# Patient Record
Sex: MALE | Race: WHITE | Employment: FULL TIME | URBAN - METROPOLITAN AREA
[De-identification: names, ages, dates, MRNs, and addresses within clinical notes are randomized per-mention and may not be internally consistent; named-entity substitution may affect disease eponyms.]

---

## 2017-03-21 ENCOUNTER — OFFICE VISIT (OUTPATIENT)
Dept: FAMILY MEDICINE CLINIC | Age: 47
End: 2017-03-21
Payer: COMMERCIAL

## 2017-03-21 VITALS
DIASTOLIC BLOOD PRESSURE: 82 MMHG | WEIGHT: 315 LBS | RESPIRATION RATE: 20 BRPM | SYSTOLIC BLOOD PRESSURE: 118 MMHG | TEMPERATURE: 99.1 F | BODY MASS INDEX: 65.41 KG/M2 | HEART RATE: 84 BPM

## 2017-03-21 DIAGNOSIS — I10 ESSENTIAL HYPERTENSION: ICD-10-CM

## 2017-03-21 DIAGNOSIS — J20.9 ACUTE BRONCHITIS, UNSPECIFIED ORGANISM: Primary | ICD-10-CM

## 2017-03-21 DIAGNOSIS — R06.2 WHEEZING: ICD-10-CM

## 2017-03-21 DIAGNOSIS — E78.5 HYPERLIPIDEMIA, UNSPECIFIED HYPERLIPIDEMIA TYPE: ICD-10-CM

## 2017-03-21 DIAGNOSIS — R73.03 PREDIABETES: ICD-10-CM

## 2017-03-21 DIAGNOSIS — E66.01 MORBID OBESITY WITH BMI OF 60.0-69.9, ADULT (HCC): ICD-10-CM

## 2017-03-21 PROCEDURE — 99213 OFFICE O/P EST LOW 20 MIN: CPT | Performed by: NURSE PRACTITIONER

## 2017-03-21 PROCEDURE — G8484 FLU IMMUNIZE NO ADMIN: HCPCS | Performed by: NURSE PRACTITIONER

## 2017-03-21 PROCEDURE — G8417 CALC BMI ABV UP PARAM F/U: HCPCS | Performed by: NURSE PRACTITIONER

## 2017-03-21 PROCEDURE — G8427 DOCREV CUR MEDS BY ELIG CLIN: HCPCS | Performed by: NURSE PRACTITIONER

## 2017-03-21 PROCEDURE — 1036F TOBACCO NON-USER: CPT | Performed by: NURSE PRACTITIONER

## 2017-03-21 RX ORDER — METHYLPREDNISOLONE 4 MG/1
TABLET ORAL
Qty: 1 KIT | Refills: 0 | Status: SHIPPED | OUTPATIENT
Start: 2017-03-21 | End: 2017-03-21 | Stop reason: CLARIF

## 2017-03-21 RX ORDER — AZITHROMYCIN 250 MG/1
TABLET, FILM COATED ORAL
Qty: 6 TABLET | Refills: 0 | Status: SHIPPED | OUTPATIENT
Start: 2017-03-21 | End: 2017-03-31

## 2017-03-21 RX ORDER — AZITHROMYCIN 250 MG/1
TABLET, FILM COATED ORAL
Qty: 6 TABLET | Refills: 0 | Status: SHIPPED | OUTPATIENT
Start: 2017-03-21 | End: 2017-03-21 | Stop reason: CLARIF

## 2017-03-21 RX ORDER — ALBUTEROL SULFATE 90 UG/1
2 AEROSOL, METERED RESPIRATORY (INHALATION) EVERY 4 HOURS PRN
Qty: 1 INHALER | Refills: 0 | Status: SHIPPED | OUTPATIENT
Start: 2017-03-21 | End: 2018-02-28 | Stop reason: SDUPTHER

## 2017-03-21 RX ORDER — METHYLPREDNISOLONE 4 MG/1
TABLET ORAL
Qty: 1 KIT | Refills: 0 | Status: SHIPPED | OUTPATIENT
Start: 2017-03-21 | End: 2017-03-27

## 2017-03-21 RX ORDER — ALBUTEROL SULFATE 90 UG/1
2 AEROSOL, METERED RESPIRATORY (INHALATION) EVERY 6 HOURS PRN
Qty: 1 INHALER | Refills: 1 | Status: SHIPPED | OUTPATIENT
Start: 2017-03-21 | End: 2017-03-21 | Stop reason: CLARIF

## 2017-03-21 ASSESSMENT — ENCOUNTER SYMPTOMS
EYE DISCHARGE: 0
CHEST TIGHTNESS: 1
VOMITING: 0
COUGH: 1
SORE THROAT: 0
EYE REDNESS: 0
WHEEZING: 1
NAUSEA: 0
SHORTNESS OF BREATH: 1
RHINORRHEA: 0
ABDOMINAL PAIN: 0
SINUS PRESSURE: 0
DIARRHEA: 0
EYE PAIN: 0

## 2017-04-21 ENCOUNTER — HOSPITAL ENCOUNTER (OUTPATIENT)
Age: 47
Discharge: HOME | End: 2017-04-21
Payer: COMMERCIAL

## 2017-04-21 DIAGNOSIS — R73.03 PREDIABETES: ICD-10-CM

## 2017-04-21 DIAGNOSIS — E78.5 HYPERLIPIDEMIA, UNSPECIFIED HYPERLIPIDEMIA TYPE: ICD-10-CM

## 2017-04-21 DIAGNOSIS — E66.01 MORBID OBESITY WITH BMI OF 60.0-69.9, ADULT (HCC): ICD-10-CM

## 2017-04-21 DIAGNOSIS — I10 ESSENTIAL HYPERTENSION: ICD-10-CM

## 2017-04-21 LAB
ALBUMIN SERPL-MCNC: 3.9 G/DL (ref 3.5–5.2)
ALBUMIN/GLOBULIN RATIO: 1.1 (ref 1–2.5)
ALP BLD-CCNC: 57 U/L (ref 40–129)
ALT SERPL-CCNC: 17 U/L (ref 5–41)
ANION GAP SERPL CALCULATED.3IONS-SCNC: 9 MMOL/L (ref 9–17)
AST SERPL-CCNC: 17 U/L
BILIRUB SERPL-MCNC: 0.34 MG/DL (ref 0.3–1.2)
BUN BLDV-MCNC: 10 MG/DL (ref 6–20)
BUN/CREAT BLD: ABNORMAL (ref 9–20)
CALCIUM SERPL-MCNC: 8.6 MG/DL (ref 8.6–10.4)
CHLORIDE BLD-SCNC: 100 MMOL/L (ref 98–107)
CHOLESTEROL/HDL RATIO: 4.2
CHOLESTEROL: 208 MG/DL
CO2: 29 MMOL/L (ref 20–31)
CREAT SERPL-MCNC: 0.78 MG/DL (ref 0.7–1.2)
ESTIMATED AVERAGE GLUCOSE: 126 MG/DL
GFR AFRICAN AMERICAN: >60 ML/MIN
GFR NON-AFRICAN AMERICAN: >60 ML/MIN
GFR SERPL CREATININE-BSD FRML MDRD: ABNORMAL ML/MIN/{1.73_M2}
GFR SERPL CREATININE-BSD FRML MDRD: ABNORMAL ML/MIN/{1.73_M2}
GLUCOSE BLD-MCNC: 104 MG/DL (ref 70–99)
HBA1C MFR BLD: 6 % (ref 4–6)
HDLC SERPL-MCNC: 50 MG/DL
LDL CHOLESTEROL: 139 MG/DL (ref 0–130)
POTASSIUM SERPL-SCNC: 4.8 MMOL/L (ref 3.7–5.3)
SODIUM BLD-SCNC: 138 MMOL/L (ref 135–144)
TOTAL PROTEIN: 7.3 G/DL (ref 6.4–8.3)
TRIGL SERPL-MCNC: 95 MG/DL
VLDLC SERPL CALC-MCNC: ABNORMAL MG/DL (ref 1–30)

## 2017-04-21 PROCEDURE — 36415 COLL VENOUS BLD VENIPUNCTURE: CPT

## 2017-04-21 PROCEDURE — 83036 HEMOGLOBIN GLYCOSYLATED A1C: CPT

## 2017-04-21 PROCEDURE — 80053 COMPREHEN METABOLIC PANEL: CPT

## 2017-04-21 PROCEDURE — 80061 LIPID PANEL: CPT

## 2017-04-24 ENCOUNTER — OFFICE VISIT (OUTPATIENT)
Dept: FAMILY MEDICINE CLINIC | Age: 47
End: 2017-04-24
Payer: COMMERCIAL

## 2017-04-24 VITALS
BODY MASS INDEX: 66.37 KG/M2 | SYSTOLIC BLOOD PRESSURE: 122 MMHG | RESPIRATION RATE: 20 BRPM | DIASTOLIC BLOOD PRESSURE: 84 MMHG | HEART RATE: 72 BPM | WEIGHT: 315 LBS | TEMPERATURE: 98 F

## 2017-04-24 DIAGNOSIS — M54.50 ACUTE MIDLINE LOW BACK PAIN WITHOUT SCIATICA: ICD-10-CM

## 2017-04-24 DIAGNOSIS — E66.01 MORBID OBESITY WITH BMI OF 60.0-69.9, ADULT (HCC): ICD-10-CM

## 2017-04-24 DIAGNOSIS — I10 ESSENTIAL HYPERTENSION: ICD-10-CM

## 2017-04-24 DIAGNOSIS — R73.03 PREDIABETES: Primary | ICD-10-CM

## 2017-04-24 DIAGNOSIS — E78.00 PURE HYPERCHOLESTEROLEMIA: ICD-10-CM

## 2017-04-24 PROCEDURE — G8417 CALC BMI ABV UP PARAM F/U: HCPCS | Performed by: NURSE PRACTITIONER

## 2017-04-24 PROCEDURE — 99214 OFFICE O/P EST MOD 30 MIN: CPT | Performed by: NURSE PRACTITIONER

## 2017-04-24 PROCEDURE — 1036F TOBACCO NON-USER: CPT | Performed by: NURSE PRACTITIONER

## 2017-04-24 PROCEDURE — G8427 DOCREV CUR MEDS BY ELIG CLIN: HCPCS | Performed by: NURSE PRACTITIONER

## 2017-04-24 RX ORDER — CYCLOBENZAPRINE HCL 10 MG
10 TABLET ORAL EVERY 8 HOURS PRN
Qty: 45 TABLET | Refills: 0 | Status: SHIPPED | OUTPATIENT
Start: 2017-04-24 | End: 2017-05-24

## 2017-04-24 RX ORDER — ASPIRIN 81 MG/1
81 TABLET ORAL DAILY
Qty: 30 TABLET | COMMUNITY
Start: 2017-04-24 | End: 2019-03-07

## 2017-04-24 RX ORDER — METFORMIN HYDROCHLORIDE 500 MG/1
500 TABLET, EXTENDED RELEASE ORAL
Qty: 60 TABLET | Refills: 2 | Status: SHIPPED | OUTPATIENT
Start: 2017-04-24 | End: 2018-04-10

## 2017-04-24 RX ORDER — IBUPROFEN 800 MG/1
800 TABLET ORAL EVERY 8 HOURS PRN
Qty: 90 TABLET | COMMUNITY
Start: 2017-04-24 | End: 2018-04-10 | Stop reason: ALTCHOICE

## 2017-04-24 RX ORDER — CYCLOBENZAPRINE HCL 10 MG
10 TABLET ORAL EVERY 8 HOURS PRN
Qty: 45 TABLET | Refills: 0 | Status: SHIPPED | OUTPATIENT
Start: 2017-04-24 | End: 2017-04-24 | Stop reason: SDUPTHER

## 2017-04-24 ASSESSMENT — ENCOUNTER SYMPTOMS
SINUS PRESSURE: 0
SORE THROAT: 0
SHORTNESS OF BREATH: 0
ABDOMINAL PAIN: 0
CHEST TIGHTNESS: 0
DIARRHEA: 0
BACK PAIN: 1
EYES NEGATIVE: 1
CONSTIPATION: 0
NAUSEA: 0
COUGH: 0

## 2017-04-24 ASSESSMENT — PATIENT HEALTH QUESTIONNAIRE - PHQ9
SUM OF ALL RESPONSES TO PHQ QUESTIONS 1-9: 0
SUM OF ALL RESPONSES TO PHQ9 QUESTIONS 1 & 2: 0
2. FEELING DOWN, DEPRESSED OR HOPELESS: 0
1. LITTLE INTEREST OR PLEASURE IN DOING THINGS: 0

## 2018-02-26 ENCOUNTER — TELEPHONE (OUTPATIENT)
Dept: FAMILY MEDICINE CLINIC | Age: 48
End: 2018-02-26

## 2018-02-26 DIAGNOSIS — R06.2 WHEEZING: ICD-10-CM

## 2018-02-26 DIAGNOSIS — J20.9 ACUTE BRONCHITIS, UNSPECIFIED ORGANISM: ICD-10-CM

## 2018-02-28 RX ORDER — ALBUTEROL SULFATE 90 UG/1
2 AEROSOL, METERED RESPIRATORY (INHALATION) EVERY 6 HOURS PRN
Qty: 8.5 G | Refills: 0 | Status: SHIPPED | OUTPATIENT
Start: 2018-02-28 | End: 2021-02-15 | Stop reason: ALTCHOICE

## 2018-04-09 ENCOUNTER — HOSPITAL ENCOUNTER (OUTPATIENT)
Age: 48
Discharge: HOME | End: 2018-04-09
Payer: COMMERCIAL

## 2018-04-09 DIAGNOSIS — R73.03 PREDIABETES: ICD-10-CM

## 2018-04-09 DIAGNOSIS — E66.01 MORBID OBESITY WITH BMI OF 60.0-69.9, ADULT (HCC): ICD-10-CM

## 2018-04-09 DIAGNOSIS — I10 ESSENTIAL HYPERTENSION: ICD-10-CM

## 2018-04-09 DIAGNOSIS — E78.00 PURE HYPERCHOLESTEROLEMIA: ICD-10-CM

## 2018-04-09 LAB
ALBUMIN SERPL-MCNC: 3.9 G/DL (ref 3.5–5.2)
ALBUMIN/GLOBULIN RATIO: 1.2 (ref 1–2.5)
ALP BLD-CCNC: 74 U/L (ref 40–129)
ALT SERPL-CCNC: 16 U/L (ref 5–41)
ANION GAP SERPL CALCULATED.3IONS-SCNC: 10 MMOL/L (ref 9–17)
AST SERPL-CCNC: 17 U/L
BILIRUB SERPL-MCNC: 0.34 MG/DL (ref 0.3–1.2)
BUN BLDV-MCNC: 11 MG/DL (ref 6–20)
BUN/CREAT BLD: NORMAL (ref 9–20)
CALCIUM SERPL-MCNC: 9.3 MG/DL (ref 8.6–10.4)
CHLORIDE BLD-SCNC: 99 MMOL/L (ref 98–107)
CHOLESTEROL/HDL RATIO: 4.6
CHOLESTEROL: 180 MG/DL
CO2: 29 MMOL/L (ref 20–31)
CREAT SERPL-MCNC: 1.06 MG/DL (ref 0.7–1.2)
GFR AFRICAN AMERICAN: >60 ML/MIN
GFR NON-AFRICAN AMERICAN: >60 ML/MIN
GFR SERPL CREATININE-BSD FRML MDRD: NORMAL ML/MIN/{1.73_M2}
GFR SERPL CREATININE-BSD FRML MDRD: NORMAL ML/MIN/{1.73_M2}
GLUCOSE BLD-MCNC: 99 MG/DL (ref 70–99)
HDLC SERPL-MCNC: 39 MG/DL
LDL CHOLESTEROL: 122 MG/DL (ref 0–130)
POTASSIUM SERPL-SCNC: 4.7 MMOL/L (ref 3.7–5.3)
SODIUM BLD-SCNC: 138 MMOL/L (ref 135–144)
TOTAL PROTEIN: 7.1 G/DL (ref 6.4–8.3)
TRIGL SERPL-MCNC: 97 MG/DL
VLDLC SERPL CALC-MCNC: ABNORMAL MG/DL (ref 1–30)

## 2018-04-09 PROCEDURE — 80061 LIPID PANEL: CPT

## 2018-04-09 PROCEDURE — 36415 COLL VENOUS BLD VENIPUNCTURE: CPT

## 2018-04-09 PROCEDURE — 83036 HEMOGLOBIN GLYCOSYLATED A1C: CPT

## 2018-04-09 PROCEDURE — 80053 COMPREHEN METABOLIC PANEL: CPT

## 2018-04-10 ENCOUNTER — OFFICE VISIT (OUTPATIENT)
Dept: FAMILY MEDICINE CLINIC | Age: 48
End: 2018-04-10
Payer: COMMERCIAL

## 2018-04-10 VITALS
BODY MASS INDEX: 50.62 KG/M2 | SYSTOLIC BLOOD PRESSURE: 136 MMHG | OXYGEN SATURATION: 99 % | WEIGHT: 315 LBS | HEART RATE: 78 BPM | DIASTOLIC BLOOD PRESSURE: 77 MMHG | TEMPERATURE: 98.5 F | HEIGHT: 66 IN

## 2018-04-10 DIAGNOSIS — I10 ESSENTIAL HYPERTENSION: ICD-10-CM

## 2018-04-10 DIAGNOSIS — R73.03 PREDIABETES: ICD-10-CM

## 2018-04-10 DIAGNOSIS — E78.00 PURE HYPERCHOLESTEROLEMIA: Primary | ICD-10-CM

## 2018-04-10 DIAGNOSIS — M25.562 CHRONIC PAIN OF LEFT KNEE: ICD-10-CM

## 2018-04-10 DIAGNOSIS — G89.29 CHRONIC PAIN OF LEFT KNEE: ICD-10-CM

## 2018-04-10 LAB
ESTIMATED AVERAGE GLUCOSE: 120 MG/DL
HBA1C MFR BLD: 5.8 % (ref 4–6)

## 2018-04-10 PROCEDURE — G8417 CALC BMI ABV UP PARAM F/U: HCPCS | Performed by: NURSE PRACTITIONER

## 2018-04-10 PROCEDURE — G8427 DOCREV CUR MEDS BY ELIG CLIN: HCPCS | Performed by: NURSE PRACTITIONER

## 2018-04-10 PROCEDURE — 99214 OFFICE O/P EST MOD 30 MIN: CPT | Performed by: NURSE PRACTITIONER

## 2018-04-10 PROCEDURE — 1036F TOBACCO NON-USER: CPT | Performed by: NURSE PRACTITIONER

## 2018-04-10 RX ORDER — FLUTICASONE FUROATE AND VILANTEROL 200; 25 UG/1; UG/1
1 POWDER RESPIRATORY (INHALATION) DAILY
Qty: 90 EACH | Refills: 3 | Status: SHIPPED | OUTPATIENT
Start: 2018-04-10 | End: 2018-09-19 | Stop reason: SDUPTHER

## 2018-04-10 RX ORDER — COVID-19 ANTIGEN TEST
440 KIT MISCELLANEOUS DAILY PRN
Qty: 60 CAPSULE | COMMUNITY
Start: 2018-04-10 | End: 2019-07-08 | Stop reason: ALTCHOICE

## 2018-04-10 RX ORDER — METFORMIN HYDROCHLORIDE 500 MG/1
500 TABLET, EXTENDED RELEASE ORAL
Qty: 60 TABLET | Refills: 5 | Status: ON HOLD | OUTPATIENT
Start: 2018-04-10 | End: 2019-10-23 | Stop reason: HOSPADM

## 2018-04-10 ASSESSMENT — ENCOUNTER SYMPTOMS
CONSTIPATION: 0
ABDOMINAL PAIN: 0
SHORTNESS OF BREATH: 1
NAUSEA: 0
BACK PAIN: 0
WHEEZING: 1
SINUS PRESSURE: 0
COUGH: 0
CHEST TIGHTNESS: 0
SORE THROAT: 0
EYES NEGATIVE: 1
DIARRHEA: 0

## 2018-06-08 ENCOUNTER — OFFICE VISIT (OUTPATIENT)
Dept: FAMILY MEDICINE CLINIC | Age: 48
End: 2018-06-08
Payer: COMMERCIAL

## 2018-06-08 VITALS
WEIGHT: 315 LBS | SYSTOLIC BLOOD PRESSURE: 130 MMHG | DIASTOLIC BLOOD PRESSURE: 88 MMHG | TEMPERATURE: 98.3 F | RESPIRATION RATE: 18 BRPM | BODY MASS INDEX: 71.99 KG/M2 | HEART RATE: 80 BPM

## 2018-06-08 DIAGNOSIS — Z11.4 ENCOUNTER FOR SCREENING FOR HIV: ICD-10-CM

## 2018-06-08 DIAGNOSIS — F51.02 ADJUSTMENT INSOMNIA: Primary | ICD-10-CM

## 2018-06-08 DIAGNOSIS — F43.21 GRIEF REACTION: ICD-10-CM

## 2018-06-08 PROCEDURE — G8427 DOCREV CUR MEDS BY ELIG CLIN: HCPCS | Performed by: NURSE PRACTITIONER

## 2018-06-08 PROCEDURE — G8417 CALC BMI ABV UP PARAM F/U: HCPCS | Performed by: NURSE PRACTITIONER

## 2018-06-08 PROCEDURE — 99214 OFFICE O/P EST MOD 30 MIN: CPT | Performed by: NURSE PRACTITIONER

## 2018-06-08 PROCEDURE — 1036F TOBACCO NON-USER: CPT | Performed by: NURSE PRACTITIONER

## 2018-06-08 RX ORDER — TRAZODONE HYDROCHLORIDE 50 MG/1
50 TABLET ORAL NIGHTLY
Qty: 30 TABLET | Refills: 5 | Status: SHIPPED | OUTPATIENT
Start: 2018-06-08 | End: 2018-06-11 | Stop reason: SDUPTHER

## 2018-06-08 ASSESSMENT — ENCOUNTER SYMPTOMS
WHEEZING: 0
EYE DISCHARGE: 0
BLOOD IN STOOL: 0
NAUSEA: 0
SINUS PRESSURE: 0
EYE PAIN: 0
EYE REDNESS: 0
ABDOMINAL DISTENTION: 0
BACK PAIN: 0
RHINORRHEA: 0
ABDOMINAL PAIN: 0
SORE THROAT: 0
CHEST TIGHTNESS: 0
VOMITING: 0
SHORTNESS OF BREATH: 0
DIARRHEA: 0
COUGH: 0

## 2018-06-11 ENCOUNTER — TELEPHONE (OUTPATIENT)
Dept: FAMILY MEDICINE CLINIC | Age: 48
End: 2018-06-11

## 2018-06-11 DIAGNOSIS — F51.02 ADJUSTMENT INSOMNIA: ICD-10-CM

## 2018-06-11 RX ORDER — TRAZODONE HYDROCHLORIDE 50 MG/1
100 TABLET ORAL NIGHTLY
Qty: 60 TABLET | Refills: 0
Start: 2018-06-11 | End: 2018-06-22 | Stop reason: SDUPTHER

## 2018-06-22 DIAGNOSIS — F51.02 ADJUSTMENT INSOMNIA: ICD-10-CM

## 2018-06-22 RX ORDER — TRAZODONE HYDROCHLORIDE 100 MG/1
100 TABLET ORAL NIGHTLY
Qty: 30 TABLET | Refills: 5 | Status: SHIPPED | OUTPATIENT
Start: 2018-06-22 | End: 2019-01-07 | Stop reason: SDUPTHER

## 2018-07-21 ENCOUNTER — HOSPITAL ENCOUNTER (OUTPATIENT)
Age: 48
Discharge: HOME | End: 2018-07-21
Payer: COMMERCIAL

## 2018-07-21 DIAGNOSIS — Z11.4 ENCOUNTER FOR SCREENING FOR HIV: ICD-10-CM

## 2018-07-21 DIAGNOSIS — R73.03 PREDIABETES: ICD-10-CM

## 2018-07-21 DIAGNOSIS — I10 ESSENTIAL HYPERTENSION: ICD-10-CM

## 2018-07-21 DIAGNOSIS — E78.00 PURE HYPERCHOLESTEROLEMIA: ICD-10-CM

## 2018-07-21 LAB
ALBUMIN SERPL-MCNC: 4.1 G/DL (ref 3.5–5.2)
ALBUMIN/GLOBULIN RATIO: 1.2 (ref 1–2.5)
ALP BLD-CCNC: 68 U/L (ref 40–129)
ALT SERPL-CCNC: 15 U/L (ref 5–41)
ANION GAP SERPL CALCULATED.3IONS-SCNC: 13 MMOL/L (ref 9–17)
AST SERPL-CCNC: 17 U/L
BILIRUB SERPL-MCNC: 0.43 MG/DL (ref 0.3–1.2)
BUN BLDV-MCNC: 13 MG/DL (ref 6–20)
BUN/CREAT BLD: NORMAL (ref 9–20)
CALCIUM SERPL-MCNC: 8.6 MG/DL (ref 8.6–10.4)
CHLORIDE BLD-SCNC: 99 MMOL/L (ref 98–107)
CO2: 25 MMOL/L (ref 20–31)
CREAT SERPL-MCNC: 0.98 MG/DL (ref 0.7–1.2)
GFR AFRICAN AMERICAN: >60 ML/MIN
GFR NON-AFRICAN AMERICAN: >60 ML/MIN
GFR SERPL CREATININE-BSD FRML MDRD: NORMAL ML/MIN/{1.73_M2}
GFR SERPL CREATININE-BSD FRML MDRD: NORMAL ML/MIN/{1.73_M2}
GLUCOSE BLD-MCNC: 94 MG/DL (ref 70–99)
HIV AG/AB: NONREACTIVE
INSULIN COMMENT: NORMAL
INSULIN REFERENCE RANGE:: NORMAL
INSULIN: 40.4 MU/L
POTASSIUM SERPL-SCNC: 4.7 MMOL/L (ref 3.7–5.3)
SODIUM BLD-SCNC: 137 MMOL/L (ref 135–144)
TOTAL PROTEIN: 7.6 G/DL (ref 6.4–8.3)

## 2018-07-21 PROCEDURE — 83036 HEMOGLOBIN GLYCOSYLATED A1C: CPT

## 2018-07-21 PROCEDURE — 87389 HIV-1 AG W/HIV-1&-2 AB AG IA: CPT

## 2018-07-21 PROCEDURE — 36415 COLL VENOUS BLD VENIPUNCTURE: CPT

## 2018-07-21 PROCEDURE — 83525 ASSAY OF INSULIN: CPT

## 2018-07-21 PROCEDURE — 80053 COMPREHEN METABOLIC PANEL: CPT

## 2018-07-23 ENCOUNTER — OFFICE VISIT (OUTPATIENT)
Dept: FAMILY MEDICINE CLINIC | Age: 48
End: 2018-07-23
Payer: COMMERCIAL

## 2018-07-23 VITALS
TEMPERATURE: 98 F | WEIGHT: 315 LBS | DIASTOLIC BLOOD PRESSURE: 88 MMHG | BODY MASS INDEX: 72.15 KG/M2 | HEART RATE: 76 BPM | RESPIRATION RATE: 16 BRPM | SYSTOLIC BLOOD PRESSURE: 136 MMHG

## 2018-07-23 DIAGNOSIS — R73.03 PREDIABETES: ICD-10-CM

## 2018-07-23 DIAGNOSIS — E66.01 MORBID OBESITY WITH BMI OF 70 AND OVER, ADULT (HCC): ICD-10-CM

## 2018-07-23 DIAGNOSIS — F51.02 ADJUSTMENT INSOMNIA: ICD-10-CM

## 2018-07-23 DIAGNOSIS — I10 ESSENTIAL HYPERTENSION: ICD-10-CM

## 2018-07-23 DIAGNOSIS — E78.00 PURE HYPERCHOLESTEROLEMIA: Primary | ICD-10-CM

## 2018-07-23 LAB
ESTIMATED AVERAGE GLUCOSE: 123 MG/DL
HBA1C MFR BLD: 5.9 % (ref 4–6)

## 2018-07-23 PROCEDURE — 99214 OFFICE O/P EST MOD 30 MIN: CPT | Performed by: NURSE PRACTITIONER

## 2018-07-23 PROCEDURE — G8427 DOCREV CUR MEDS BY ELIG CLIN: HCPCS | Performed by: NURSE PRACTITIONER

## 2018-07-23 PROCEDURE — G8417 CALC BMI ABV UP PARAM F/U: HCPCS | Performed by: NURSE PRACTITIONER

## 2018-07-23 PROCEDURE — 1036F TOBACCO NON-USER: CPT | Performed by: NURSE PRACTITIONER

## 2018-07-23 ASSESSMENT — PATIENT HEALTH QUESTIONNAIRE - PHQ9
SUM OF ALL RESPONSES TO PHQ9 QUESTIONS 1 & 2: 0
2. FEELING DOWN, DEPRESSED OR HOPELESS: 0
SUM OF ALL RESPONSES TO PHQ QUESTIONS 1-9: 0
1. LITTLE INTEREST OR PLEASURE IN DOING THINGS: 0

## 2018-07-23 ASSESSMENT — ENCOUNTER SYMPTOMS
NAUSEA: 0
COUGH: 0
WHEEZING: 0
CHEST TIGHTNESS: 0
SORE THROAT: 0
CONSTIPATION: 0
BACK PAIN: 0
SHORTNESS OF BREATH: 0
EYES NEGATIVE: 1
ABDOMINAL PAIN: 0
SINUS PRESSURE: 0
DIARRHEA: 0
VOMITING: 0

## 2018-07-23 NOTE — PROGRESS NOTES
Subjective:      Patient ID: Christel Mckee is a 50 y.o. male. Visit Information    Have you changed or started any medications since your last visit including any over-the-counter medicines, vitamins, or herbal medicines? no   Are you having any side effects from any of your medications? -  no  Have you stopped taking any of your medications? Is so, why? -  no    Have you seen any other physician or provider since your last visit? No  Have you had any other diagnostic tests since your last visit? No  Have you been seen in the emergency room and/or had an admission to a hospital since we last saw you? No  Have you had your routine dental cleaning in the past 6 months? no    Have you activated your [employer] account? If not, what are your barriers?  Yes     Patient Care Team:  LAMONT Santo - CNP as PCP - General (Family Nurse Practitioner)    Medical History Review  Past Medical, Family, and Social History reviewed and does contribute to the patient presenting condition    Health Maintenance   Topic Date Due    DTaP/Tdap/Td vaccine (1 - Tdap) 09/01/2018 (Originally 2/26/1989)    Flu vaccine (1) 09/01/2018    A1C test (Diabetic or Prediabetic)  07/21/2019    Potassium monitoring  07/21/2019    Creatinine monitoring  07/21/2019    Lipid screen  04/09/2023    HIV screen  Completed       PHQ Scores 7/23/2018 4/24/2017   PHQ2 Score 0 0   PHQ9 Score 0 0     Interpretation of Total Score Depression Severity: 1-4 = Minimal depression, 5-9 = Mild depression, 10-14 = Moderate depression, 15-19 = Moderately severe depression, 20-27 = Severe depression    Current Outpatient Prescriptions   Medication Sig Dispense Refill    traZODone (DESYREL) 100 MG tablet Take 1 tablet by mouth nightly 30 tablet 5    Naproxen Sodium (ALEVE) 220 MG CAPS Take 440 mg by mouth daily as needed for Pain 60 capsule     metFORMIN (GLUCOPHAGE XR) 500 MG extended release tablet Take 1 tablet by mouth 2 times daily (before meals) 60 tablet 5    Fluticasone Furoate-Vilanterol (BREO ELLIPTA) 200-25 MCG/INH AEPB Inhale 1 puff into the lungs daily 90 each 3    albuterol sulfate HFA (PROAIR HFA) 108 (90 Base) MCG/ACT inhaler Inhale 2 puffs into the lungs every 6 hours as needed for Wheezing or Shortness of Breath 8.5 g 0    aspirin EC 81 MG EC tablet Take 1 tablet by mouth daily 30 tablet      No current facility-administered medications for this visit. Patient presents today for routine follow-up on his chronic conditions including hyperlipidemia, prediabetes, and hypertension. His blood pressure today shows good control on his current medications and he denies any side effects. Had recent fasting labs on like to review those today. Hyperlipidemia   This is a chronic problem. The current episode started more than 1 year ago. The problem is controlled. Recent lipid tests were reviewed and are variable. He has no history of diabetes or hypothyroidism. There are no known factors aggravating his hyperlipidemia. Pertinent negatives include no chest pain, leg pain, myalgias or shortness of breath. Current antihyperlipidemic treatment includes diet change. The current treatment provides no improvement of lipids. Compliance problems include adherence to exercise. Risk factors for coronary artery disease include male sex, obesity, dyslipidemia, family history, hypertension, stress and a sedentary lifestyle. Hypertension   This is a chronic problem. The current episode started more than 1 year ago. The problem is controlled. Pertinent negatives include no anxiety, chest pain, headaches, palpitations, peripheral edema or shortness of breath. Agents associated with hypertension include NSAIDs. Risk factors for coronary artery disease include sedentary lifestyle, male gender, obesity, dyslipidemia and family history. Past treatments include nothing. The current treatment provides mild improvement. Compliance problems include exercise.   There is no history of CAD/MI or CVA. Review of Systems   Constitutional: Negative for activity change, appetite change, fatigue and fever. HENT: Negative for congestion, sinus pressure and sore throat. Eyes: Negative. Wears glasses   Respiratory: Negative for cough, chest tightness, shortness of breath and wheezing. History of asthma - diagnosed years ago with mild case   Cardiovascular: Negative for chest pain, palpitations and leg swelling. Gastrointestinal: Negative for abdominal pain, constipation, diarrhea, nausea and vomiting. Genitourinary: Negative. Musculoskeletal: Positive for arthralgias (left knee pain). Negative for back pain and myalgias. Cramping in back more lately   Skin: Negative for rash. Neurological: Negative for dizziness, syncope, numbness and headaches. Psychiatric/Behavioral: Positive for sleep disturbance. Negative for agitation, dysphoric mood, self-injury and suicidal ideas. The patient is not nervous/anxious. Sleeping better and mood is better with trazodone       Objective:     /88 (Site: Left Arm, Position: Sitting, Cuff Size: Large Adult)   Pulse 76   Temp 98 °F (36.7 °C) (Tympanic)   Resp 16   Wt (!) 447 lb (202.8 kg)   BMI 72.15 kg/m²      Physical Exam   Constitutional: He is oriented to person, place, and time. He appears well-developed and well-nourished. No distress. Obese   HENT:   Head: Normocephalic and atraumatic. Right Ear: Tympanic membrane and external ear normal.   Left Ear: Tympanic membrane and external ear normal.   Nose: Mucosal edema and rhinorrhea present. Right sinus exhibits no maxillary sinus tenderness and no frontal sinus tenderness. Left sinus exhibits no maxillary sinus tenderness and no frontal sinus tenderness. Mouth/Throat: Uvula is midline and mucous membranes are normal. Posterior oropharyngeal erythema present. No oropharyngeal exudate or posterior oropharyngeal edema.    Eyes: Conjunctivae are normal. Right eye exhibits no discharge. Left eye exhibits no discharge. Neck: Neck supple. Cardiovascular: Normal rate and regular rhythm. Exam reveals distant heart sounds (due to body habitus). Pulmonary/Chest: Effort normal. No respiratory distress. He has decreased breath sounds (diffuse). He has no wheezes. He has no rhonchi. He has no rales. Abdominal: Soft. He exhibits no distension. There is no tenderness. Protuberant    Lymphadenopathy:     He has no cervical adenopathy. Neurological: He is alert and oriented to person, place, and time. Skin: Skin is warm and dry. Psychiatric: His behavior is normal. He exhibits a depressed mood. Mood is improved   Nursing note and vitals reviewed. Assessment:      Diagnosis Orders   1. Pure hypercholesterolemia  Comprehensive Metabolic Panel    Lipid Panel    Insulin, total    CBC   2. Essential hypertension  Comprehensive Metabolic Panel    Lipid Panel    Insulin, total    CBC   3. Prediabetes  Comprehensive Metabolic Panel    Lipid Panel    Insulin, total    CBC   4. Morbid obesity with BMI of 70 and over, adult [facility])  Comprehensive Metabolic Panel    Lipid Panel    Insulin, total    CBC   5. Adjustment insomnia         Plan:     Reviewed recent labs in detail today  Take metformin regularly as prescribed  Repeat fasting labs in October with follow up after  Advised to participate in stress relieving activities  Recommend regular exercise  Recommend low fat / carb diet  Advise on good sleep hygiene-going to the bed at same time and aiming for 8 hours of interrupted sleep each night  Recommend continue current medications  Monitor for worsening symptoms   Call with concerns   Return in about 3 months (around 10/23/2018) for depression, HTN, hyperlipidemia.     Uma Edmonds received counseling on the following healthy behaviors: nutrition, exercise and medication adherence  Reviewed prior labs and health maintenance  Continue current

## 2018-09-19 ENCOUNTER — OFFICE VISIT (OUTPATIENT)
Dept: FAMILY MEDICINE CLINIC | Age: 48
End: 2018-09-19
Payer: COMMERCIAL

## 2018-09-19 VITALS
HEIGHT: 66 IN | HEART RATE: 72 BPM | RESPIRATION RATE: 20 BRPM | TEMPERATURE: 98.3 F | WEIGHT: 315 LBS | BODY MASS INDEX: 50.62 KG/M2

## 2018-09-19 DIAGNOSIS — M25.552 LEFT HIP PAIN: ICD-10-CM

## 2018-09-19 DIAGNOSIS — E66.01 MORBID OBESITY WITH BMI OF 70 AND OVER, ADULT (HCC): ICD-10-CM

## 2018-09-19 DIAGNOSIS — M54.32 LEFT SCIATIC NERVE PAIN: Primary | ICD-10-CM

## 2018-09-19 PROCEDURE — G8417 CALC BMI ABV UP PARAM F/U: HCPCS | Performed by: NURSE PRACTITIONER

## 2018-09-19 PROCEDURE — G8427 DOCREV CUR MEDS BY ELIG CLIN: HCPCS | Performed by: NURSE PRACTITIONER

## 2018-09-19 PROCEDURE — 96372 THER/PROPH/DIAG INJ SC/IM: CPT | Performed by: NURSE PRACTITIONER

## 2018-09-19 PROCEDURE — 99214 OFFICE O/P EST MOD 30 MIN: CPT | Performed by: NURSE PRACTITIONER

## 2018-09-19 PROCEDURE — 1036F TOBACCO NON-USER: CPT | Performed by: NURSE PRACTITIONER

## 2018-09-19 RX ORDER — PREDNISONE 20 MG/1
TABLET ORAL
Qty: 30 TABLET | Refills: 0 | Status: SHIPPED | OUTPATIENT
Start: 2018-09-19 | End: 2018-09-29

## 2018-09-19 RX ORDER — GABAPENTIN 100 MG/1
100 CAPSULE ORAL 3 TIMES DAILY
Qty: 90 CAPSULE | Refills: 0 | Status: SHIPPED | OUTPATIENT
Start: 2018-09-19 | End: 2018-09-24 | Stop reason: SDUPTHER

## 2018-09-19 RX ORDER — KETOROLAC TROMETHAMINE 30 MG/ML
60 INJECTION, SOLUTION INTRAMUSCULAR; INTRAVENOUS ONCE
Status: COMPLETED | OUTPATIENT
Start: 2018-09-19 | End: 2018-09-19

## 2018-09-19 RX ADMIN — KETOROLAC TROMETHAMINE 60 MG: 30 INJECTION, SOLUTION INTRAMUSCULAR; INTRAVENOUS at 12:10

## 2018-09-19 ASSESSMENT — ENCOUNTER SYMPTOMS
NAUSEA: 0
CONSTIPATION: 0
SHORTNESS OF BREATH: 0
EYE REDNESS: 0
EYE ITCHING: 0
DIARRHEA: 0
EYE DISCHARGE: 0
COUGH: 0
RHINORRHEA: 0
ABDOMINAL PAIN: 0
SORE THROAT: 0

## 2018-09-19 NOTE — LETTER
[address] [address]  Phone: [phone]  Fax: [phone], APRN - CNP        September 19, 2018     Patient: Lesli Renee   YOB: 1970   Date of Visit: 9/19/2018       To Whom It May Concern: It is my medical opinion that Lesli Renee Unable to work from 9/19-9/26. May return on 9/27. Related to new onset sciatic nerve flare up. Diagnostic code. M54.32    If you have any questions or concerns, please don't hesitate to call.     Sincerely,          Tessa Oreilly, LAMONT - CNP

## 2018-09-19 NOTE — PATIENT INSTRUCTIONS
click on the \"Sign Up Now\" link. Current as of: November 29, 2017  Content Version: 11.7  © 2006-2018 [employer]. Care instructions adapted under license by [facility] ([facility]). If you have questions about a medical condition or this instruction, always ask your healthcare professional. [address] any warranty or liability for your use of this information. Sciatica: Care Instructions  Your Care Instructions    Sciatica (say \"vul-DU-zh-kuh\") is an irritation of one of the sciatic nerves, which come from the spinal cord in the lower back. The sciatic nerves and their branches extend down through the buttock to the foot. Sciatica can develop when an injured disc in the back presses against a spinal nerve root. Its main symptom is pain, numbness, or weakness that is often worse in the leg or foot than in the back. Sciatica often will improve and go away with time. Early treatment usually includes medicines and exercises to relieve pain. Follow-up care is a key part of your treatment and safety. Be sure to make and go to all appointments, and call your doctor if you are having problems. It's also a good idea to know your test results and keep a list of the medicines you take. How can you care for yourself at home? · Take pain medicines exactly as directed. ¨ If the doctor gave you a prescription medicine for pain, take it as prescribed. ¨ If you are not taking a prescription pain medicine, ask your doctor if you can take an over-the-counter medicine. · Use heat or ice to relieve pain. ¨ To apply heat, put a warm water bottle, heating pad set on low, or warm cloth on your back. Do not go to sleep with a heating pad on your skin. ¨ To use ice, put ice or a cold pack on the area for 10 to 20 minutes at a time. Put a thin cloth between the ice and your skin. · Avoid sitting if possible, unless it feels better than standing. · Alternate lying down with short walks.  Increase

## 2018-09-19 NOTE — PROGRESS NOTES
MCG/INH AEPB Inhale 1 puff into the lungs daily 90 each 3    albuterol sulfate HFA (PROAIR HFA) 108 (90 Base) MCG/ACT inhaler Inhale 2 puffs into the lungs every 6 hours as needed for Wheezing or Shortness of Breath 8.5 g 0    aspirin EC 81 MG EC tablet Take 1 tablet by mouth daily 30 tablet      No current facility-administered medications for this visit. Patient ID: Wiliam Sebastian is a 50 y.o. male. Patient presents in office today for issues which started 2.5 - 3 weeks ago. 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 Took two weeks off vacation because he couldn't get back up onto machine. Pain starred in his anterior groin. Also hurts in his left hip. Pulled a groin muscle before and that's what he thought it was. Feels a little similar to how it was but also different. The pain is worse when he is getting in and out of car but has been getting easier and easier. Pain in hip seems to be getting worse. No pain in groin just sitting but 3-4 in his right hip depending on what he is sitting on. Toilet is the worse. No swelling, redness, or warmth in calf or leg. Hip did seem swollen last night. Pain described as a throbbing. Numbness when sitting on the toilet down the left leg but that is it. Legs have chronic discoloration. Purplish red. No changes lately. Does not think he can go back to work. Takes naproxen for pain when at work but hasn't taken it in a while. No other medications tried. Taking other medications as prescribed. Due for lab work. Would like to speak with someone about weight loss surgery. No other concerns right now. Review of Systems   Constitutional: Negative for activity change, appetite change, fatigue and fever. HENT: Negative for congestion, ear pain, postnasal drip, rhinorrhea and sore throat. Eyes: Negative for discharge, redness and itching. Respiratory: Negative for cough and shortness of breath. Cardiovascular: Negative for chest pain.

## 2018-09-20 RX ORDER — FLUTICASONE FUROATE AND VILANTEROL 200; 25 UG/1; UG/1
1 POWDER RESPIRATORY (INHALATION) DAILY
Qty: 90 EACH | Refills: 3 | Status: SHIPPED | OUTPATIENT
Start: 2018-09-20 | End: 2019-03-03 | Stop reason: SDUPTHER

## 2018-09-20 NOTE — TELEPHONE ENCOUNTER
LOV:7-23-18  DORIS:[id number]  LRF:4-10-18  Health Maintenance   Topic Date Due    DTaP/Tdap/Td vaccine (1 - Tdap) 02/26/1989    Flu vaccine (1) 09/01/2018    A1C test (Diabetic or Prediabetic)  07/21/2019    Potassium monitoring  07/21/2019    Creatinine monitoring  07/21/2019    Lipid screen  04/09/2023    HIV screen  Completed             (applicable per patient's age: Cancer Screenings, Depression Screening, Fall Risk Screening, Immunizations)    Hemoglobin A1C (%)   Date Value   07/21/2018 5.9   04/09/2018 5.8   04/21/2017 6.0     LDL Cholesterol (mg/dL)   Date Value   04/09/2018 122     LDL Calculated (mg/dL)   Date Value   06/05/2015 120     AST (U/L)   Date Value   07/21/2018 17     ALT (U/L)   Date Value   07/21/2018 15     BUN (mg/dL)   Date Value   07/21/2018 13      (goal A1C is < 7)   (goal LDL is <100) need 30-50% reduction from baseline     BP Readings from Last 3 Encounters:   07/23/18 136/88   06/08/18 130/88   04/10/18 136/77    (goal /80)      All Future Testing planned in [facility]:  Lab Frequency Next Occurrence   Comprehensive Metabolic Panel Once 98/50/0520   Lipid Panel Once 10/21/2018   Insulin, total Once 10/21/2018   CBC Once 10/21/2018       Next Visit Date:  Future Appointments  Date Time Provider Betty Garcia   10/24/2018 3:00 PM LAMONT Barrett - SARAH BETH Larsen            Patient Active Problem List:     Vitamin D deficiency     Left shoulder pain     Scalp psoriasis     Prediabetes     Essential hypertension     Sleep apnea     Morbid obesity with BMI of 70 and over, adult ([facility] [address]     Morbid obesity with BMI of 60.0-69.9, adult ([facility] [address]     Pure hypercholesterolemia

## 2018-09-24 ENCOUNTER — TELEPHONE (OUTPATIENT)
Dept: FAMILY MEDICINE CLINIC | Age: 48
End: 2018-09-24

## 2018-09-24 ENCOUNTER — TELEPHONE (OUTPATIENT)
Dept: BARIATRICS/WEIGHT MGMT | Age: 48
End: 2018-09-24

## 2018-09-24 DIAGNOSIS — M25.552 LEFT HIP PAIN: ICD-10-CM

## 2018-09-24 DIAGNOSIS — M54.32 LEFT SCIATIC NERVE PAIN: ICD-10-CM

## 2018-09-24 RX ORDER — GABAPENTIN 100 MG/1
200 CAPSULE ORAL 3 TIMES DAILY
Qty: 180 CAPSULE | Refills: 0 | COMMUNITY
Start: 2018-09-24 | End: 2018-10-24 | Stop reason: SDUPTHER

## 2018-09-26 ENCOUNTER — OFFICE VISIT (OUTPATIENT)
Dept: BARIATRICS/WEIGHT MGMT | Age: 48
End: 2018-09-26
Payer: COMMERCIAL

## 2018-09-26 VITALS
HEART RATE: 80 BPM | SYSTOLIC BLOOD PRESSURE: 132 MMHG | BODY MASS INDEX: 50.62 KG/M2 | WEIGHT: 315 LBS | DIASTOLIC BLOOD PRESSURE: 78 MMHG | HEIGHT: 66 IN

## 2018-09-26 DIAGNOSIS — K21.9 GASTROESOPHAGEAL REFLUX DISEASE WITHOUT ESOPHAGITIS: ICD-10-CM

## 2018-09-26 DIAGNOSIS — G47.33 OBSTRUCTIVE SLEEP APNEA: Primary | ICD-10-CM

## 2018-09-26 DIAGNOSIS — R73.03 PREDIABETES: ICD-10-CM

## 2018-09-26 DIAGNOSIS — E66.01 MORBID OBESITY (HCC): ICD-10-CM

## 2018-09-26 PROCEDURE — G8417 CALC BMI ABV UP PARAM F/U: HCPCS | Performed by: SURGERY

## 2018-09-26 PROCEDURE — 1036F TOBACCO NON-USER: CPT | Performed by: SURGERY

## 2018-09-26 PROCEDURE — 99204 OFFICE O/P NEW MOD 45 MIN: CPT | Performed by: SURGERY

## 2018-09-26 PROCEDURE — G8427 DOCREV CUR MEDS BY ELIG CLIN: HCPCS | Performed by: SURGERY

## 2018-09-26 NOTE — PROGRESS NOTES
[facility] INVASIVE BARIATRIC SURG  [address] [facility]  [address]  [address] [place]  [address]  Dept: [phone]    SURGICAL WEIGHT MANAGEMENT PROGRAM  PROGRESS NOTE INITIAL EVALUATION     Patient: Lety Coleman        Service Date: 9/26/2018      HPI:     CC: Weight Loss    The patient is a pleasant 50y.o. year old male  with morbid obesity, who stands Height: 5' 5.5\" (166.4 cm) tall with a weight of Weight: (!) 450 lb (204.1 kg) , resulting in a BMI of Body mass index is 73.75 kg/m². . The patient suffers from multiple co-morbidities as a result of morbid obesity, including: Asthma, Obstructive Sleep Apnea treated with BiPAP/CPAP and Degenerative Joint Disease (DJD). He has suffered from obesity for many years. He admits to indigestion and pain with meals at times. He had a CPAP machine years ago. The patient denies  a history of myocardial infarction, deep vein thrombosis, pulmonary embolism, renal failure, hepatic failure and stroke. The patient has failed multiple attempts at non-surgical weight loss, and is now seeking surgical intervention to promote permanent and consistent weight loss. He  has chosen Sleeve Gastrectomy. He is well educated regarding it, as he has recently viewed our weight loss surgery informational seminar .      Medical History:  Past Medical History:   Diagnosis Date    Allergic rhinitis     Asthma     Hypertension     Obesity     Sprain, lumbar     Unspecified sleep apnea        Surgical History:  Past Surgical History:   Procedure Laterality Date    HEEL SPUR SURGERY         Family History:  Family History   Problem Relation Age of Onset    Cancer Maternal Grandfather         lung    Broken Bones Paternal Grandmother         hip    Glaucoma Paternal Grandfather     Diabetes Mother     High Cholesterol Mother     Mental Illness Mother     Heart Disease Mother     Other Mother         GERD, TASHA    High Blood Pressure Mother     Depression Mother sticks in the workplace    Comprehension    Ability to grasp concepts and respond to questions:   [x] High   [] Medium   [] Low    Motivation    [x] Asks Questions; eager to learn   [] Needs education   [] Extreme anxiety    [] uncooperative   [] Denies need for education    English Speaking Ability    [x] Speaks English well   [] Reads [place] well   [] Understands spoken Maryjane Finders    [] Understands written English   [] No need for interpretive support      [] Might benefit from interpretive support   []  required for all services     REVIEW OF SYSTEMS:     Do you feel sleepy during the day? [x] Yes     [] No  Do you get short of breath when walking up two flights of stairs? [x] Yes     [] No  Do you get chest pains when walking up two flights of stairs? [] Yes     [x] No  Do you suffer from back pain? [x] Yes     [] No  Do you suffer from knee pain? [x] Yes     [] No    Do you or have you had any of the following?   Cardiovascular YES NO Respiratory YES NO   High Blood Pressure   [x]   [] COPD   []   [x]   Heart Attack   []   [x] TB/Positive skin Test   []   [x]   Congestive Heart Failure   []   [x] Obstructive Sleep Apnea   [x]   []   Coronary Artery Disease   []   [x] Asthma   [x]   []   Circulation Problems   []   [x]      Activity Intolerance   []   [x] Gastrointestinal YES NO   Peripheral Vascular Disease   []   [x] Gastric Problems   [x]   []        Colorectal problems   []   [x]   Hematological YES NO Ulcer disease   []   [x]   Bleeding Tendencies   []   [x] Liver disease   []   [x]   Blood Transfusion last 30d   []   [x] Gallstones   []   [x]   Anemia   []   [x] Refulx or Heartburn   []   [x]   Blood Clots   []   [x]      High Cholesterol   []   [x] Muscoloskeletal YES NO   High Triglycerides   []   [x] Joint Limitations   []   [x]      Muscle Weakness   []   [x]   Eyes, Ears, Nose, Throat YES NO Multiple Sclerosis   []   [x]   Cataracts   []   [x] Arthritis   [x]   [] Glasses   [x]   []      Blurred Vision   []   [x] Cancer   []   [x]   Hearing Aids   []   [x] Type:     Ringing in Ears   []   [x]      Difficulty Swallowing   []   [x] Encodrine YES NO      Diabetes   []   [x]   Neurological YES NO Thyroid   []   [x]   Stroke   []   [x]      Seizure   []   [x] Psychiatric Disorder YES NO   Dizziness/Blackouts/Fainting   []   [x] Depression   []   [x]   Memory Impairement   []   [x] Bipolar   []   [x]   Parkinson's   []   [x] Anxiety disorder   []   [x]           Genitourinary/Gyn YES NO Skin Intact   [x]   []   Urinary Infection   []   [x]      Stones   []   [x] Sleep   YES NO   Kidney Disease   []   [x] Excessive daytime sleepiness   [x]   []   Incontinent   []   [x] Snoring   [x]   []   Irregular menstrual cycles   []   [] Unrefreshed sleep   [x]   []   Possibly Pregnant? []     [] Other:      Date of LMP:   Preferred location:       PREVIOUS ANESTHESIA  Has any family member had a problem with anesthesia in the past?  [x] No   [] Yes  If yes, describe:  Have you had a problem with anesthesia in the past?                 [x] No   [] Yes  If yes, describe:  Do you have any difficulty moving your head side-to-side? [x] No   [] Yes  Do you have difficulty opening or closing your jaw? [x] No   [] Yes       PRESENT ILLNESS:     Weight Parameters  Weight (!) 450 lb (204.1 kg)   Height 5' 5.5\" (1.664 m)   BMI Body mass index is 73.75 kg/m².    IBW     EBW               IMMUNIZATION STATUS  Immunization History   Administered Date(s) Administered    Influenza Vaccine, unspecified formulation 10/15/2015, 10/26/2016       FALLS ASSESSMENT    [x] LOW RISK FOR FALLS    [] MODERATE RISK FOR FALLS    [] Difficulty walking/selfcare    [] Falls in the past 2 months    [] Suspicion of Clinician    [] Other:      SMOKING CESSATION     [] Not needed     [x] Instructed to stop smoking    [] Pamphlet community resources given     VTE SCREEN    [] Family hx DVT/PE  /   [] Personal hx of DVT/PE    [x] Denies any family or personal hx of DVT/PE    Physician Review    [x] Past medical, family, & social history reviewed and discussed with patient. Review of surgery and post-surgical changes (by surgeon for surgical patients only)    [x] Lifelong diet expectations reviewed with patient    [x] Need for lifelong vitamin supplementation reviewed with patient    PHYSICAL EXAMINATION:      /78   Pulse 80   Ht 5' 5.5\" (1.664 m)   Wt (!) 450 lb (204.1 kg)   BMI 73.75 kg/m²     Constitutional:  Vital signs are normal. The patient appears well-developed   HEENT:      Head: Normocephalic. Atraumatic     Eyes: pupils are equal and reactive. No scleral icterus is present. Neck: No mass and no thyromegaly present. Cardiovascular: Normal rate, regular rhythm, S1 normal and S2 normal.  Bilateral pulses present. Pulmonary/Chest: Effort normal and breath sounds normal. No retractions. Abdominal: Soft. Normal appearance. There is no organomegaly. No tenderness. There is no rigidity, no rebound, no guarding and no Smith's sign. Musculoskeletal:      Right lower leg: Normal. No tenderness and no edema. Left lower leg: Normal. No tenderness and no edema. Lymphadenopathy:     No cervical adenopathy, No Exrtemity Adenopathy. Neurological: The patient is alert and oriented. Moving all four extremities equally, sensation grossly intact bilateral.  Skin: Skin is warm, dry and intact. Psychiatric: The patient has a normal mood and affect. Speech is normal and behavior is normal. Judgment and thought content normal. Cognition and memory are normal.     RECOMMENDATIONS:     We spent a great deal of time discussing the risks and benefits of Sleeve Gastrectomy, including but not limited to injury to intra-abdominal organs, breakdown of the gastric staple line, the need for re-operative therapy,  prolonged hospitalization,  mechanical ventilation,  and death.  We discussed the possibility of bleeding, the need for blood transfusions, blood clots, hospital-acquired and intra-abdominal infection, anastomotic stricture, and worsening GERD. And we discussed the need for post-operative visit compliance, behavior modifications and diet changes, protein and vitamin supplementation, as well as routine scheduled and dedicated exercise. I instructed the patient to utilize the exercise log that will be given to them at their fist dietician appointment. We discussed the potential weight loss benefit of approximately 50-60% of his excess body weight at 12-18 months post-op, as well as the possibility of insufficient weight loss or weight gain after 2 years post-operative time. PLAN:       Diagnosis Orders   1. Obstructive sleep apnea     2. Prediabetes     3. Gastroesophageal reflux disease without esophagitis     4. Morbid obesity ([address]            Initial Testing     Primary Procedure: Sleeve Gastrectomy     Labwork: Initial Pre-surgical Lab Tests (CMP, TSH, Fasting Lipid Profile, Mg, Zinc, Vit B1 (whole blood), Vit B12, 25-OH Vit D, Fe,  Ferritin,  Folate), Urine drug and alcohol screen  and Negative serum nicotine prior to submission for pre-auth    Endoscopic Studies: Upper GI Endoscopy for GERD which has been untreated. Psychological Assessment: Psychological Evaluation and Clearance    Nutrition Assessment: Bariatric Nutrition Assessment and Clearance    Pulmonary Evaluation: Obstructive Sleep Apnea Evaluation, Copy of Previous Sleep Study and CPAP Settings    Other  Consultations: PCP clearance    Physician Supervised Diet and Exercise required by the patients insurance company: 3 months.       Surgical Diet requirement:  2 week    Final Testing  Screening Chest Xray  and EKG within 6 months of date of surgery    Labwork:  Final Lab Tests  within 3 months of date of surgery (CBC, PT/PTT, BMP)     Electronically signed by Katie Jackman DO on 9/26/2018 at 9:21 AM

## 2018-10-09 ENCOUNTER — NURSE ONLY (OUTPATIENT)
Dept: BARIATRICS/WEIGHT MGMT | Age: 48
End: 2018-10-09

## 2018-10-09 VITALS — WEIGHT: 315 LBS | BODY MASS INDEX: 73.91 KG/M2

## 2018-10-24 ENCOUNTER — NURSE ONLY (OUTPATIENT)
Dept: BARIATRICS/WEIGHT MGMT | Age: 48
End: 2018-10-24

## 2018-10-24 ENCOUNTER — OFFICE VISIT (OUTPATIENT)
Dept: FAMILY MEDICINE CLINIC | Age: 48
End: 2018-10-24
Payer: COMMERCIAL

## 2018-10-24 VITALS
DIASTOLIC BLOOD PRESSURE: 86 MMHG | RESPIRATION RATE: 18 BRPM | WEIGHT: 315 LBS | BODY MASS INDEX: 72.93 KG/M2 | SYSTOLIC BLOOD PRESSURE: 128 MMHG | HEART RATE: 76 BPM | TEMPERATURE: 98.4 F

## 2018-10-24 DIAGNOSIS — R73.03 PREDIABETES: ICD-10-CM

## 2018-10-24 DIAGNOSIS — I10 ESSENTIAL HYPERTENSION: Primary | ICD-10-CM

## 2018-10-24 DIAGNOSIS — E66.01 MORBID OBESITY WITH BMI OF 70 AND OVER, ADULT (HCC): ICD-10-CM

## 2018-10-24 DIAGNOSIS — E78.00 PURE HYPERCHOLESTEROLEMIA: ICD-10-CM

## 2018-10-24 DIAGNOSIS — M54.32 LEFT SCIATIC NERVE PAIN: ICD-10-CM

## 2018-10-24 DIAGNOSIS — M25.552 LEFT HIP PAIN: ICD-10-CM

## 2018-10-24 PROCEDURE — 1036F TOBACCO NON-USER: CPT | Performed by: NURSE PRACTITIONER

## 2018-10-24 PROCEDURE — G8427 DOCREV CUR MEDS BY ELIG CLIN: HCPCS | Performed by: NURSE PRACTITIONER

## 2018-10-24 PROCEDURE — 99214 OFFICE O/P EST MOD 30 MIN: CPT | Performed by: NURSE PRACTITIONER

## 2018-10-24 PROCEDURE — G8417 CALC BMI ABV UP PARAM F/U: HCPCS | Performed by: NURSE PRACTITIONER

## 2018-10-24 PROCEDURE — G8484 FLU IMMUNIZE NO ADMIN: HCPCS | Performed by: NURSE PRACTITIONER

## 2018-10-24 RX ORDER — CYCLOBENZAPRINE HCL 10 MG
10 TABLET ORAL 3 TIMES DAILY PRN
Qty: 60 TABLET | Refills: 0 | Status: SHIPPED | OUTPATIENT
Start: 2018-10-24 | End: 2018-11-23

## 2018-10-24 RX ORDER — GABAPENTIN 300 MG/1
300 CAPSULE ORAL 3 TIMES DAILY
Qty: 90 CAPSULE | Refills: 2 | Status: ON HOLD | OUTPATIENT
Start: 2018-10-24 | End: 2019-10-23 | Stop reason: HOSPADM

## 2018-10-24 ASSESSMENT — ENCOUNTER SYMPTOMS
COUGH: 0
WHEEZING: 0
VOMITING: 0
SHORTNESS OF BREATH: 0
CONSTIPATION: 0
NAUSEA: 0
SINUS PRESSURE: 0
CHEST TIGHTNESS: 0
ABDOMINAL PAIN: 0
EYES NEGATIVE: 1
DIARRHEA: 0
BACK PAIN: 0
SORE THROAT: 0

## 2018-10-24 NOTE — PATIENT INSTRUCTIONS
your lower back). 3. Keep your lower back pressed to the floor. Hold for at least 15 to 30 seconds. 4. Relax, and lower the knee to the starting position. 5. Repeat with the other leg. Repeat 2 to 4 times with each leg. 6. To get more stretch, put your other leg flat on the floor while pulling your knee to your chest.  Curl-ups    1. Lie on the floor on your back with your knees bent at a 90-degree angle. Your feet should be flat on the floor, about 12 inches from your buttocks. 2. Cross your arms over your chest. If this bothers your neck, try putting your hands behind your neck (not your head), with your elbows spread apart. 3. Slowly tighten your belly muscles and raise your shoulder blades off the floor. 4. Keep your head in line with your body, and do not press your chin to your chest.  5. Hold this position for 1 or 2 seconds, then slowly lower yourself back down to the floor. 6. Repeat 8 to 12 times. Pelvic tilt exercise    1. Lie on your back with your knees bent. 2. \"Brace\" your stomach. This means to tighten your muscles by pulling in and imagining your belly button moving toward your spine. You should feel like your back is pressing to the floor and your hips and pelvis are rocking back. 3. Hold for about 6 seconds while you breathe smoothly. 4. Repeat 8 to 12 times. Heel dig bridging    1. Lie on your back with both knees bent and your ankles bent so that only your heels are digging into the floor. Your knees should be bent about 90 degrees. 2. Then push your heels into the floor, squeeze your buttocks, and lift your hips off the floor until your shoulders, hips, and knees are all in a straight line. 3. Hold for about 6 seconds as you continue to breathe normally, and then slowly lower your hips back down to the floor and rest for up to 10 seconds. 4. Do 8 to 12 repetitions. Hamstring stretch in doorway    1. Lie on your back in a doorway, with one leg through the open door.   2. Slide

## 2018-10-26 ENCOUNTER — HOSPITAL ENCOUNTER (OUTPATIENT)
Age: 48
Discharge: HOME | End: 2018-10-26
Payer: COMMERCIAL

## 2018-10-26 DIAGNOSIS — E66.01 MORBID OBESITY WITH BMI OF 70 AND OVER, ADULT (HCC): ICD-10-CM

## 2018-10-26 DIAGNOSIS — I10 ESSENTIAL HYPERTENSION: ICD-10-CM

## 2018-10-26 DIAGNOSIS — G47.33 OBSTRUCTIVE SLEEP APNEA: ICD-10-CM

## 2018-10-26 DIAGNOSIS — R73.03 PREDIABETES: ICD-10-CM

## 2018-10-26 DIAGNOSIS — E78.00 PURE HYPERCHOLESTEROLEMIA: ICD-10-CM

## 2018-10-26 DIAGNOSIS — E66.01 MORBID OBESITY (HCC): ICD-10-CM

## 2018-10-26 LAB
ALBUMIN SERPL-MCNC: 4.1 G/DL (ref 3.5–5.2)
ALBUMIN/GLOBULIN RATIO: 1.2 (ref 1–2.5)
ALP BLD-CCNC: 63 U/L (ref 40–129)
ALT SERPL-CCNC: 20 U/L (ref 5–41)
ANION GAP SERPL CALCULATED.3IONS-SCNC: 11 MMOL/L (ref 9–17)
AST SERPL-CCNC: 21 U/L
BILIRUB SERPL-MCNC: 0.44 MG/DL (ref 0.3–1.2)
BUN BLDV-MCNC: 8 MG/DL (ref 6–20)
BUN/CREAT BLD: ABNORMAL (ref 9–20)
CALCIUM SERPL-MCNC: 8.9 MG/DL (ref 8.6–10.4)
CHLORIDE BLD-SCNC: 97 MMOL/L (ref 98–107)
CHOLESTEROL/HDL RATIO: 4.8
CHOLESTEROL: 182 MG/DL
CO2: 26 MMOL/L (ref 20–31)
CREAT SERPL-MCNC: 0.92 MG/DL (ref 0.7–1.2)
ESTIMATED AVERAGE GLUCOSE: 126 MG/DL
FERRITIN: 146 UG/L (ref 30–400)
FOLATE: 7.1 NG/ML
GFR AFRICAN AMERICAN: >60 ML/MIN
GFR NON-AFRICAN AMERICAN: >60 ML/MIN
GFR SERPL CREATININE-BSD FRML MDRD: ABNORMAL ML/MIN/{1.73_M2}
GFR SERPL CREATININE-BSD FRML MDRD: ABNORMAL ML/MIN/{1.73_M2}
GLUCOSE BLD-MCNC: 107 MG/DL (ref 70–99)
HBA1C MFR BLD: 6 % (ref 4–6)
HCT VFR BLD CALC: 48.1 % (ref 40.7–50.3)
HDLC SERPL-MCNC: 38 MG/DL
HEMOGLOBIN: 15.7 G/DL (ref 13–17)
INSULIN COMMENT: NORMAL
INSULIN REFERENCE RANGE:: NORMAL
INSULIN: 27 MU/L
IRON SATURATION: 26 % (ref 20–55)
IRON: 80 UG/DL (ref 59–158)
LDL CHOLESTEROL: 118 MG/DL (ref 0–130)
MAGNESIUM: 2.3 MG/DL (ref 1.6–2.6)
MCH RBC QN AUTO: 28 PG (ref 25.2–33.5)
MCHC RBC AUTO-ENTMCNC: 32.6 G/DL (ref 28.4–34.8)
MCV RBC AUTO: 85.7 FL (ref 82.6–102.9)
NRBC AUTOMATED: 0 PER 100 WBC
PDW BLD-RTO: 12.7 % (ref 11.8–14.4)
PLATELET # BLD: 239 K/UL (ref 138–453)
PMV BLD AUTO: 10.3 FL (ref 8.1–13.5)
POTASSIUM SERPL-SCNC: 4.4 MMOL/L (ref 3.7–5.3)
PTH INTACT: 108.1 PG/ML (ref 15–65)
RBC # BLD: 5.61 M/UL (ref 4.21–5.77)
SODIUM BLD-SCNC: 134 MMOL/L (ref 135–144)
THYROXINE, FREE: 1.09 NG/DL (ref 0.93–1.7)
TOTAL IRON BINDING CAPACITY: 308 UG/DL (ref 250–450)
TOTAL PROTEIN: 7.4 G/DL (ref 6.4–8.3)
TRIGL SERPL-MCNC: 129 MG/DL
TSH SERPL DL<=0.05 MIU/L-ACNC: 1.84 MIU/L (ref 0.3–5)
UNSATURATED IRON BINDING CAPACITY: 228 UG/DL (ref 112–347)
VITAMIN B-12: 244 PG/ML (ref 232–1245)
VITAMIN D 25-HYDROXY: 15.9 NG/ML (ref 30–100)
VLDLC SERPL CALC-MCNC: ABNORMAL MG/DL (ref 1–30)
WBC # BLD: 8.7 K/UL (ref 3.5–11.3)

## 2018-10-26 PROCEDURE — 83525 ASSAY OF INSULIN: CPT

## 2018-10-26 PROCEDURE — 83735 ASSAY OF MAGNESIUM: CPT

## 2018-10-26 PROCEDURE — 36415 COLL VENOUS BLD VENIPUNCTURE: CPT

## 2018-10-26 PROCEDURE — 80053 COMPREHEN METABOLIC PANEL: CPT

## 2018-10-26 PROCEDURE — 83550 IRON BINDING TEST: CPT

## 2018-10-26 PROCEDURE — 83540 ASSAY OF IRON: CPT

## 2018-10-26 PROCEDURE — 84630 ASSAY OF ZINC: CPT

## 2018-10-26 PROCEDURE — 83970 ASSAY OF PARATHORMONE: CPT

## 2018-10-26 PROCEDURE — 84443 ASSAY THYROID STIM HORMONE: CPT

## 2018-10-26 PROCEDURE — 84439 ASSAY OF FREE THYROXINE: CPT

## 2018-10-26 PROCEDURE — 82607 VITAMIN B-12: CPT

## 2018-10-26 PROCEDURE — 82746 ASSAY OF FOLIC ACID SERUM: CPT

## 2018-10-26 PROCEDURE — 84590 ASSAY OF VITAMIN A: CPT

## 2018-10-26 PROCEDURE — 82306 VITAMIN D 25 HYDROXY: CPT

## 2018-10-26 PROCEDURE — 82728 ASSAY OF FERRITIN: CPT

## 2018-10-26 PROCEDURE — 84425 ASSAY OF VITAMIN B-1: CPT

## 2018-10-26 PROCEDURE — 83036 HEMOGLOBIN GLYCOSYLATED A1C: CPT

## 2018-10-26 PROCEDURE — 80061 LIPID PANEL: CPT

## 2018-10-26 PROCEDURE — 85027 COMPLETE CBC AUTOMATED: CPT

## 2018-10-30 LAB
RETINYL PALMITATE: <0.02 MG/L (ref 0–0.1)
VITAMIN A LEVEL: 0.37 MG/L (ref 0.3–1.2)
VITAMIN A, INTERP: NORMAL

## 2018-10-31 LAB — ZINC: 63 UG/DL (ref 60–120)

## 2018-11-01 LAB — VITAMIN B1 WHOLE BLOOD: 91 NMOL/L (ref 70–180)

## 2018-11-03 ENCOUNTER — HOSPITAL ENCOUNTER (OUTPATIENT)
Dept: SLEEP CENTER | Age: 48
Discharge: HOME | End: 2018-11-05
Payer: COMMERCIAL

## 2018-11-03 DIAGNOSIS — R73.03 PREDIABETES: ICD-10-CM

## 2018-11-03 DIAGNOSIS — E66.01 MORBID OBESITY (HCC): ICD-10-CM

## 2018-11-03 DIAGNOSIS — G47.33 OBSTRUCTIVE SLEEP APNEA: ICD-10-CM

## 2018-11-03 PROCEDURE — 95810 POLYSOM 6/> YRS 4/> PARAM: CPT

## 2018-11-03 ASSESSMENT — SLEEP AND FATIGUE QUESTIONNAIRES
HOW LIKELY ARE YOU TO NOD OFF OR FALL ASLEEP WHEN YOU ARE A PASSENGER IN A CAR FOR AN HOUR WITHOUT A BREAK: 0
DO YOU SNORE: YES
HOW LIKELY ARE YOU TO NOD OFF OR FALL ASLEEP WHILE SITTING AND READING: 3
ARE YOU TIRED AFTER SLEEPING: ALMOST DAILY
HOW LIKELY ARE YOU TO NOD OFF OR FALL ASLEEP WHILE SITTING QUIETLY AFTER LUNCH WITHOUT ALCOHOL: 0
HOW LIKELY ARE YOU TO NOD OFF OR FALL ASLEEP IN A CAR, WHILE STOPPED FOR A FEW MINUTES IN TRAFFIC: 0
HOW LIKELY ARE YOU TO NOD OFF OR FALL ASLEEP WHILE SITTING INACTIVE IN A PUBLIC PLACE: 0
SNORING VOLUME: LOUDER THAN TALKING
HOW LIKELY ARE YOU TO NOD OFF OR FALL ASLEEP WHILE LYING DOWN TO REST IN THE AFTERNOON WHEN CIRCUMSTANCES PERMIT: 2
HAS ANYONE NOTICED THAT YOU QUIT BREATHING DURING SLEEP: ALMOST DAILY
HOW OFTEN DO YOU SNORE: ALMOST DAILY
HAVE YOU EVER NODDED OFF OR FALLEN ASLEEP WHILE DRIVING: NO
ARE YOU TIRED DURING WAKE TIME: ALMOST DAILY
DO YOU HAVE HIGH BLOOD PRESSURE: NO
ESS TOTAL SCORE: 5
HOW LIKELY ARE YOU TO NOD OFF OR FALL ASLEEP WHILE SITTING AND TALKING TO SOMEONE: 0
HOW LIKELY ARE YOU TO NOD OFF OR FALL ASLEEP WHILE WATCHING TV: 0

## 2018-11-04 VITALS — BODY MASS INDEX: 52.48 KG/M2 | HEIGHT: 65 IN | WEIGHT: 315 LBS

## 2018-11-05 ENCOUNTER — TELEPHONE (OUTPATIENT)
Dept: BARIATRICS/WEIGHT MGMT | Age: 48
End: 2018-11-05

## 2018-11-05 NOTE — TELEPHONE ENCOUNTER
Patient calling to follow up on whether we have received letter faxed from Catrachito Young CNP. Informed patient that I was unable to locate same in chart. Patient states he will follow with PCP office.

## 2018-11-07 ENCOUNTER — OFFICE VISIT (OUTPATIENT)
Dept: BARIATRICS/WEIGHT MGMT | Age: 48
End: 2018-11-07
Payer: COMMERCIAL

## 2018-11-07 VITALS
HEART RATE: 80 BPM | BODY MASS INDEX: 52.48 KG/M2 | DIASTOLIC BLOOD PRESSURE: 74 MMHG | WEIGHT: 315 LBS | SYSTOLIC BLOOD PRESSURE: 128 MMHG | HEIGHT: 65 IN | RESPIRATION RATE: 22 BRPM

## 2018-11-07 DIAGNOSIS — E66.01 MORBID OBESITY WITH BMI OF 70 AND OVER, ADULT (HCC): ICD-10-CM

## 2018-11-07 DIAGNOSIS — R73.03 PREDIABETES: ICD-10-CM

## 2018-11-07 DIAGNOSIS — E78.00 PURE HYPERCHOLESTEROLEMIA: ICD-10-CM

## 2018-11-07 DIAGNOSIS — G47.33 OBSTRUCTIVE SLEEP APNEA SYNDROME: ICD-10-CM

## 2018-11-07 DIAGNOSIS — I10 ESSENTIAL HYPERTENSION: Primary | ICD-10-CM

## 2018-11-07 DIAGNOSIS — E55.9 VITAMIN D DEFICIENCY: ICD-10-CM

## 2018-11-07 PROCEDURE — 99213 OFFICE O/P EST LOW 20 MIN: CPT | Performed by: NURSE PRACTITIONER

## 2018-11-07 PROCEDURE — 1036F TOBACCO NON-USER: CPT | Performed by: NURSE PRACTITIONER

## 2018-11-07 PROCEDURE — G8484 FLU IMMUNIZE NO ADMIN: HCPCS | Performed by: NURSE PRACTITIONER

## 2018-11-07 PROCEDURE — G8417 CALC BMI ABV UP PARAM F/U: HCPCS | Performed by: NURSE PRACTITIONER

## 2018-11-07 PROCEDURE — G8427 DOCREV CUR MEDS BY ELIG CLIN: HCPCS | Performed by: NURSE PRACTITIONER

## 2018-11-07 RX ORDER — ERGOCALCIFEROL 1.25 MG/1
50000 CAPSULE ORAL WEEKLY
Qty: 8 CAPSULE | Refills: 0 | Status: SHIPPED | OUTPATIENT
Start: 2018-11-07 | End: 2019-03-07

## 2018-11-08 DIAGNOSIS — G47.33 OSA (OBSTRUCTIVE SLEEP APNEA): Primary | ICD-10-CM

## 2018-11-26 ENCOUNTER — OFFICE VISIT (OUTPATIENT)
Dept: PULMONOLOGY | Age: 48
End: 2018-11-26
Payer: COMMERCIAL

## 2018-11-26 VITALS
RESPIRATION RATE: 18 BRPM | TEMPERATURE: 98.4 F | WEIGHT: 315 LBS | HEIGHT: 65 IN | BODY MASS INDEX: 52.48 KG/M2 | HEART RATE: 62 BPM | SYSTOLIC BLOOD PRESSURE: 132 MMHG | DIASTOLIC BLOOD PRESSURE: 74 MMHG

## 2018-11-26 DIAGNOSIS — G47.33 OSA (OBSTRUCTIVE SLEEP APNEA): ICD-10-CM

## 2018-11-26 DIAGNOSIS — R09.82 POST-NASAL DRIP: ICD-10-CM

## 2018-11-26 DIAGNOSIS — E66.01 MORBID OBESITY DUE TO EXCESS CALORIES (HCC): Primary | ICD-10-CM

## 2018-11-26 DIAGNOSIS — J45.40 MODERATE PERSISTENT ASTHMA, UNSPECIFIED WHETHER COMPLICATED: ICD-10-CM

## 2018-11-26 PROBLEM — J45.909 ASTHMA: Status: ACTIVE | Noted: 2018-11-26

## 2018-11-26 PROCEDURE — G8427 DOCREV CUR MEDS BY ELIG CLIN: HCPCS | Performed by: INTERNAL MEDICINE

## 2018-11-26 PROCEDURE — G8484 FLU IMMUNIZE NO ADMIN: HCPCS | Performed by: INTERNAL MEDICINE

## 2018-11-26 PROCEDURE — 99244 OFF/OP CNSLTJ NEW/EST MOD 40: CPT | Performed by: INTERNAL MEDICINE

## 2018-11-26 PROCEDURE — G8417 CALC BMI ABV UP PARAM F/U: HCPCS | Performed by: INTERNAL MEDICINE

## 2018-11-26 RX ORDER — FLUTICASONE PROPIONATE 50 MCG
2 SPRAY, SUSPENSION (ML) NASAL DAILY
Qty: 1 BOTTLE | Refills: 11 | Status: SHIPPED | OUTPATIENT
Start: 2018-11-26 | End: 2019-07-08 | Stop reason: ALTCHOICE

## 2018-11-26 NOTE — PROGRESS NOTES
extended release tablet Take 1 tablet by mouth 2 times daily (before meals) 60 tablet 5    albuterol sulfate HFA (PROAIR HFA) 108 (90 Base) MCG/ACT inhaler Inhale 2 puffs into the lungs every 6 hours as needed for Wheezing or Shortness of Breath 8.5 g 0    aspirin EC 81 MG EC tablet Take 1 tablet by mouth daily 30 tablet      No current facility-administered medications for this visit. FAMILY HISTORY: family history includes Asthma in his father; Broken Bones in his paternal grandmother; Cancer in his maternal grandfather; Cancer (age of onset: 48) in his sister; Depression in his mother; Diabetes in his father and mother; Glaucoma in his paternal grandfather; Heart Disease in his mother; High Blood Pressure in his father and mother; High Cholesterol in his father and mother; Mental Illness in his mother; Other in his father and mother. SOCIAL AND OCCUPATIONAL HEALTH:  The patient is a Never smoker. There  is not history of TB or TB exposure. There is not asbestos or silica dust exposure. The patient reports does not have coal, foundry, quarry or [employer] exposure. Travel history reveals no significant history of risk factors for pulm disease. There is not  history of recreational or IV drug use. The patient does not pets, dogs, cats turtles or exotic birds.         Review of Systems:  Review of Systems -   General ROS: Completed and except as mentioned above were negative   Psychological ROS:  Completed and except as mentioned above were negative  Ophthalmic ROS:  Completed and except as mentioned above were negative  ENT ROS:  Completed and except as mentioned above were negative  Allergy and Immunology ROS:  Completed and except as mentioned above were negative  Hematological and Lymphatic ROS:  Completed and except as mentioned above were negative  Endocrine ROS: Completed and except as mentioned above were negative  Breast ROS:  Completed and except as mentioned above were negative  Respiratory ROS:  Completed and except as mentioned above were negative  Cardiovascular ROS:  Completed and except as mentioned above were negative  Gastrointestinal ROS: Completed and except as mentioned above were negative  Genito-Urinary ROS:  Completed and except as mentioned above were negative  Musculoskeletal ROS:  Completed and except as mentioned above were negative  Neurological ROS:  Completed and except as mentioned above were negative  Dermatological ROS:  Completed and except as mentioned above were negative    SLEEP  No epistaxis or sore throat. Has daytime sleepiness and fatigue and tiredness. No MVA. No edema. PHYSICAL EXAMINATION:  Vitals:    11/26/18 1354   BP: 132/74   Site: Left Lower Arm   Position: Sitting   Pulse: 62   Resp: 18   Temp: 98.4 °F (36.9 °C)   Weight: (!) 445 lb 12.8 oz (202.2 kg)   Height: 5' 5\" (1.651 m)     PHYSICAL EXAMINATION:  Vitals:    11/26/18 1354   BP: 132/74   Site: Left Lower Arm   Position: Sitting   Pulse: 62   Resp: 18   Temp: 98.4 °F (36.9 °C)   Weight: (!) 445 lb 12.8 oz (202.2 kg)   Height: 5' 5\" (1.651 m)     Constitutional: This is a well developed, well nourished, Greater than 40 - Morbid Obesity / Extreme Obesity / Grade III 50y.o. year old male who is alert, oriented, cooperative and in no apparent distress. Head:normocephalic and atraumatic. EENT:  ELI. No conjunctival injections. Septum was midline, mucosa was without erythema, exudates or cobblestoning. No thrush was noted. MallampatiIII (soft palate, base of uvula visible)  Neck: Supple without thyromegaly. No elevated JVP. Trachea was midline. Respiratory: Chest was symmetrical without dullness to percussion. Breath sounds bilaterally were clear to auscultation. There were no wheezes, rhonchi or rales. There is no intercostal retraction or use of accessory muscles. No egophony noted. Cardiovascular: Regular without murmur, clicks, gallops or rubs.     Abdomen: Slightly rounded and soft without organomegaly. No rebound, rigidity or guarding was appreciated. Lymphatic: No lymphadenopathy. Musculoskeletal: Normal curvature of the spine. No gross muscle weakness. Extremities:  Has bilateral lower extremity edema, no ulcerations, tenderness, varicosities or erythema. Muscle size, tone and strength are normal.  No involuntary movements are noted. Coordination appears adequate. Sensory function appears intact. Deep tendon reflexes are normal.   Skin:  Warm and dry. Good color, turgor and pigmentation. No lesions or scars. No cyanosis or clubbing  Neurological/Psychiatric: The patient's general behavior, level of consciousness, thought content and emotional status is normal.          DATA:     pft's-none    Polysomnogram showed moderately severe sleep apnea with an AHI of 26.5    CXR: REVIEWED: None      IMPRESSION:   1. Morbid obesity due to excess calories ([address]    2. TASHA (obstructive sleep apnea)    3. Moderate persistent asthma, unspecified whether complicated    4. Post-nasal drip                   PLAN:       Risk assessment for bariatric surgery will be completed after the second sleep study is done  Refills were provided -Flonase  Patient was recommended to have prednisone and an antibiotic available for use during an exacerbation  Educated and clarified the medication use. Shown the patient how to use Flonase  Discussed use, benefit, and side effects of prescribed medications. Barriers to medication compliance addressed. Mitali Mann received counseling on the following healthy behaviors: nutrition, exercise and medication adherence. Recommended. Asthma education  Recommend flu vaccination in the fall annually. He will have the flu vaccination at work  Recommendations given regarding pneumococcal vaccinations. Patient is up-to-date with vaccinations from pulmonary perspective. Maintain an active lifestyle.   Patient was educated on how to use the

## 2018-11-27 ENCOUNTER — TELEPHONE (OUTPATIENT)
Dept: PULMONOLOGY | Age: 48
End: 2018-11-27

## 2018-12-04 ENCOUNTER — OFFICE VISIT (OUTPATIENT)
Dept: BARIATRICS/WEIGHT MGMT | Age: 48
End: 2018-12-04
Payer: COMMERCIAL

## 2018-12-04 VITALS
DIASTOLIC BLOOD PRESSURE: 60 MMHG | RESPIRATION RATE: 28 BRPM | BODY MASS INDEX: 52.48 KG/M2 | WEIGHT: 315 LBS | SYSTOLIC BLOOD PRESSURE: 134 MMHG | HEART RATE: 72 BPM | HEIGHT: 65 IN

## 2018-12-04 DIAGNOSIS — R73.03 PREDIABETES: ICD-10-CM

## 2018-12-04 DIAGNOSIS — J45.909 UNCOMPLICATED ASTHMA, UNSPECIFIED ASTHMA SEVERITY, UNSPECIFIED WHETHER PERSISTENT: ICD-10-CM

## 2018-12-04 DIAGNOSIS — I10 ESSENTIAL HYPERTENSION: Primary | ICD-10-CM

## 2018-12-04 DIAGNOSIS — E66.01 MORBID OBESITY WITH BMI OF 70 AND OVER, ADULT (HCC): ICD-10-CM

## 2018-12-04 DIAGNOSIS — G47.33 OBSTRUCTIVE SLEEP APNEA SYNDROME: ICD-10-CM

## 2018-12-04 DIAGNOSIS — E78.00 PURE HYPERCHOLESTEROLEMIA: ICD-10-CM

## 2018-12-04 PROCEDURE — G8427 DOCREV CUR MEDS BY ELIG CLIN: HCPCS | Performed by: NURSE PRACTITIONER

## 2018-12-04 PROCEDURE — 99213 OFFICE O/P EST LOW 20 MIN: CPT | Performed by: NURSE PRACTITIONER

## 2018-12-04 PROCEDURE — G8417 CALC BMI ABV UP PARAM F/U: HCPCS | Performed by: NURSE PRACTITIONER

## 2018-12-04 PROCEDURE — 1036F TOBACCO NON-USER: CPT | Performed by: NURSE PRACTITIONER

## 2018-12-04 PROCEDURE — G8484 FLU IMMUNIZE NO ADMIN: HCPCS | Performed by: NURSE PRACTITIONER

## 2018-12-04 RX ORDER — CYCLOBENZAPRINE HCL 10 MG
10 TABLET ORAL 3 TIMES DAILY PRN
Qty: 60 TABLET | Refills: 0 | Status: SHIPPED | OUTPATIENT
Start: 2018-12-04 | End: 2019-12-04

## 2018-12-04 NOTE — PROGRESS NOTES
Sexual activity: Yes     Partners: Female      Comment: girlfriend had tubal     Other Topics Concern    Not on file     Social History Narrative    No narrative on file       Current Medications:  Current Outpatient Prescriptions   Medication Sig Dispense Refill    cyclobenzaprine (FLEXERIL) 10 MG tablet Take 1 tablet by mouth 3 times daily as needed for Muscle spasms 60 tablet 0    fluticasone (FLONASE) 50 MCG/ACT nasal spray 2 sprays by Nasal route daily 1 Bottle 11    vitamin D (ERGOCALCIFEROL) 87476 units CAPS capsule Take 1 capsule by mouth once a week for 8 doses 8 capsule 0    gabapentin (NEURONTIN) 300 MG capsule Take 1 capsule by mouth 3 times daily. . 90 capsule 2    Fluticasone Furoate-Vilanterol (BREO ELLIPTA) 200-25 MCG/INH AEPB Inhale 1 puff into the lungs daily 90 each 3    traZODone (DESYREL) 100 MG tablet Take 1 tablet by mouth nightly 30 tablet 5    Naproxen Sodium (ALEVE) 220 MG CAPS Take 440 mg by mouth daily as needed for Pain 60 capsule     metFORMIN (GLUCOPHAGE XR) 500 MG extended release tablet Take 1 tablet by mouth 2 times daily (before meals) 60 tablet 5    albuterol sulfate HFA (PROAIR HFA) 108 (90 Base) MCG/ACT inhaler Inhale 2 puffs into the lungs every 6 hours as needed for Wheezing or Shortness of Breath 8.5 g 0    aspirin EC 81 MG EC tablet Take 1 tablet by mouth daily 30 tablet      No current facility-administered medications for this visit. Vital Signs:  /60 (Site: Right Lower Arm, Position: Sitting, Cuff Size: Large Adult)   Pulse 72   Resp 28   Ht 5' 5\" (1.651 m)   Wt (!) 444 lb (201.4 kg)   BMI 73.89 kg/m²     BMI/Height/Weight:  Body mass index is 73.89 kg/m². Review of Systems - A review of systems was performed. All was negative unless otherwise documented in HPI. Constitutional: Negative for fever, chills and diaphoresis. HENT: Negative for hearing loss and trouble swallowing. Eyes: Negative for photophobia and visual disturbance. Respiratory: Negative for cough, shortness of breath and wheezing. Cardiovascular: Negative for chest pain and palpitations. Gastrointestinal: Negative for nausea, vomiting, abdominal pain, diarrhea, constipation, blood in stool and abdominal distention. Endocrine: Negative for polydipsia, polyphagia and polyuria. Genitourinary: Negative for dysuria, frequency, hematuria and difficulty urinating. Musculoskeletal: Negative for myalgias, joint swelling. Skin: Negative for pallor and rash. Neurological: Negative for dizziness, tremors, light-headedness and headaches. Psychiatric/Behavioral: Negative for sleep disturbance and dysphoric mood. Objective:      Physical Exam   Vital signs reviewed. General: Well-developed and well-nourished. No acute distress. Skin: Warm, dry and intact. HEENT: Normocephalic. EOMs intact. Conjunctivae normal. Neck supple. Cardiovascular: Normal rate, regular rhythm. No murmur. Pulmonary/Chest: Normal effort. Lungs clear to auscultation. No rales, rhonchi or wheezing. Abdominal: Positive bowel sounds. Soft, nontender. Nondistended. Musculoskeletal: Movement x4. No edema. Neurological: Gait normal. Alert and oriented to person, place, and time. Psychiatric: Normal mood and affect. Speech and behavior normal. Judgment and thought content normal. Cognition and memory intact. Assessment:       Diagnosis Orders   1. Essential hypertension     2. Obstructive sleep apnea syndrome     3. Prediabetes     4. Uncomplicated asthma, unspecified asthma severity, unspecified whether persistent     5. Pure hypercholesterolemia     6. Morbid obesity with BMI of 70 and over, adult [facility])         Plan:    Dietitian visit today. Patient was encouraged to journal all food intake. Keep calorie level at approximately 8915-9402. Protein intake is to be a minimum of 60-80 grams per day. Water drinking was encouraged with a goal of 64oz-128oz daily.  Beverages to be calorie

## 2018-12-04 NOTE — TELEPHONE ENCOUNTER
ZNX:[id number]  ROV:1-16-19  YVU:[id number]  Health Maintenance   Topic Date Due    DTaP/Tdap/Td vaccine (1 - Tdap) 02/26/1989    Pneumococcal med risk (1 of 1 - PPSV23) 02/26/1989    Flu vaccine (1) 12/12/2018 (Originally 9/1/2018)    A1C test (Diabetic or Prediabetic)  10/26/2019    Potassium monitoring  10/26/2019    Creatinine monitoring  10/26/2019    Lipid screen  10/26/2023    HIV screen  Completed             (applicable per patient's age: Cancer Screenings, Depression Screening, Fall Risk Screening, Immunizations)    Hemoglobin A1C (%)   Date Value   10/26/2018 6.0   07/21/2018 5.9   04/09/2018 5.8     LDL Cholesterol (mg/dL)   Date Value   10/26/2018 118     LDL Calculated (mg/dL)   Date Value   06/05/2015 120     AST (U/L)   Date Value   10/26/2018 21     ALT (U/L)   Date Value   10/26/2018 20     BUN (mg/dL)   Date Value   10/26/2018 8      (goal A1C is < 7)   (goal LDL is <100) need 30-50% reduction from baseline     BP Readings from Last 3 Encounters:   11/26/18 132/74   11/07/18 128/74   10/24/18 128/86    (goal /80)      All Future Testing planned in [facility]:  Lab Frequency Next Occurrence   Sleep Study with PAP Titration Once 11/08/2018       Next Visit Date:  Future Appointments  Date Time Provider Betty Weissi   12/4/2018 4:30 PM LAMONT Sol CNP Weight Mgmt [facility]   12/8/2018 7:30 PM [facility] SLEEP RM 7 [facility] None   1/16/2019 3:00 PM LAMONT Neil CNP [place] [facility]   5/20/2019 4:00 PM SCHEDULE, RESP SPEC [place] Resp [place] [facility]            Patient Active Problem List:     Vitamin D deficiency     Left shoulder pain     Scalp psoriasis     Prediabetes     Essential hypertension     Sleep apnea     Morbid obesity with BMI of 70 and over, adult ([facility] [address]     Pure hypercholesterolemia     Asthma

## 2018-12-05 NOTE — PROGRESS NOTES
caffeinated beverages prior to my surgery to [facility] daily. 11. I will eliminate cold cereals prepared with milk prior to surgery. 12. I will do a 5 minute reflection    13. I will food journal daily (If I dont find this helpful after one month I may discontinue the behavior with the understanding that it will be important to my health to do this for the first three months following surgery). 14. I will exercise daily for 10-30  minutes daily 24 days per month or more as tolerated. I will keep a daily log of my physical activity each day. 15. I will determine my optimal supplement plan. 16. I will purchase my supplements. 17. I will begin taking supplements according to my plan. 18. I will eat 8-11 servings of lean protein daily following the guidelines for meal planning in the patient educational binder provided to me. 19. I will eat every 3-5 hours for all meals for one day each week on a day of my choosing. 20. I will maintain my fluid intake of at least 64oz daily. 21. I will follow the 15/30/15 rule at least one day each week for all meals I am allowed to have a small 4oz beverage as needed at meal times. ( 15-30-15-do not drink 15minutes prior to a meal, take 30minutes to eat your meal and dont drink 15 minutes after your meal)    22. I will eat around my plate at all meals at least one day each week on a day of my choosing. Please write down the greatest motivator that brought you to us today  I want to manage my weight because I want to be comfortable                                appt # na oa What is your next step? G# 1 2 3 4 5 6 7 8 9   0  x  1 100             x  2            2    3 100 100           x   4             x   5                6            2   I will limit to 1 bottle of sweet tea per day.  7             x   8             x   9             x

## 2018-12-08 ENCOUNTER — HOSPITAL ENCOUNTER (OUTPATIENT)
Dept: SLEEP CENTER | Age: 48
Discharge: HOME | End: 2018-12-10
Payer: COMMERCIAL

## 2018-12-08 DIAGNOSIS — G47.33 OSA (OBSTRUCTIVE SLEEP APNEA): ICD-10-CM

## 2018-12-08 PROCEDURE — 95811 POLYSOM 6/>YRS CPAP 4/> PARM: CPT

## 2019-01-07 DIAGNOSIS — F51.02 ADJUSTMENT INSOMNIA: ICD-10-CM

## 2019-01-08 RX ORDER — TRAZODONE HYDROCHLORIDE 100 MG/1
100 TABLET ORAL NIGHTLY
Qty: 30 TABLET | Refills: 5 | Status: SHIPPED | OUTPATIENT
Start: 2019-01-08 | End: 2021-02-15 | Stop reason: ALTCHOICE

## 2019-01-09 ENCOUNTER — NURSE ONLY (OUTPATIENT)
Dept: BARIATRICS/WEIGHT MGMT | Age: 49
End: 2019-01-09

## 2019-01-15 ENCOUNTER — OFFICE VISIT (OUTPATIENT)
Dept: BARIATRICS/WEIGHT MGMT | Age: 49
End: 2019-01-15
Payer: COMMERCIAL

## 2019-01-15 VITALS
SYSTOLIC BLOOD PRESSURE: 128 MMHG | DIASTOLIC BLOOD PRESSURE: 78 MMHG | HEIGHT: 65 IN | WEIGHT: 315 LBS | HEART RATE: 68 BPM | BODY MASS INDEX: 52.48 KG/M2

## 2019-01-15 DIAGNOSIS — E78.00 PURE HYPERCHOLESTEROLEMIA: ICD-10-CM

## 2019-01-15 DIAGNOSIS — J45.909 UNCOMPLICATED ASTHMA, UNSPECIFIED ASTHMA SEVERITY, UNSPECIFIED WHETHER PERSISTENT: ICD-10-CM

## 2019-01-15 DIAGNOSIS — I10 ESSENTIAL HYPERTENSION: Primary | ICD-10-CM

## 2019-01-15 DIAGNOSIS — E66.01 MORBID OBESITY WITH BMI OF 70 AND OVER, ADULT (HCC): ICD-10-CM

## 2019-01-15 DIAGNOSIS — R73.03 PREDIABETES: ICD-10-CM

## 2019-01-15 DIAGNOSIS — G47.33 OBSTRUCTIVE SLEEP APNEA SYNDROME: ICD-10-CM

## 2019-01-15 LAB — STATUS: NORMAL

## 2019-01-15 PROCEDURE — G8417 CALC BMI ABV UP PARAM F/U: HCPCS | Performed by: NURSE PRACTITIONER

## 2019-01-15 PROCEDURE — 1036F TOBACCO NON-USER: CPT | Performed by: NURSE PRACTITIONER

## 2019-01-15 PROCEDURE — 99213 OFFICE O/P EST LOW 20 MIN: CPT | Performed by: NURSE PRACTITIONER

## 2019-01-15 PROCEDURE — G8484 FLU IMMUNIZE NO ADMIN: HCPCS | Performed by: NURSE PRACTITIONER

## 2019-01-15 PROCEDURE — G8427 DOCREV CUR MEDS BY ELIG CLIN: HCPCS | Performed by: NURSE PRACTITIONER

## 2019-01-16 ENCOUNTER — OFFICE VISIT (OUTPATIENT)
Dept: FAMILY MEDICINE CLINIC | Age: 49
End: 2019-01-16
Payer: COMMERCIAL

## 2019-01-16 VITALS
TEMPERATURE: 98.7 F | BODY MASS INDEX: 73.72 KG/M2 | WEIGHT: 315 LBS | RESPIRATION RATE: 16 BRPM | SYSTOLIC BLOOD PRESSURE: 130 MMHG | HEART RATE: 68 BPM | DIASTOLIC BLOOD PRESSURE: 84 MMHG

## 2019-01-16 DIAGNOSIS — I10 ESSENTIAL HYPERTENSION: Primary | ICD-10-CM

## 2019-01-16 DIAGNOSIS — R73.03 PREDIABETES: ICD-10-CM

## 2019-01-16 DIAGNOSIS — M25.552 LEFT HIP PAIN: ICD-10-CM

## 2019-01-16 DIAGNOSIS — M54.32 LEFT SCIATIC NERVE PAIN: ICD-10-CM

## 2019-01-16 DIAGNOSIS — E78.00 PURE HYPERCHOLESTEROLEMIA: ICD-10-CM

## 2019-01-16 LAB — STATUS: NORMAL

## 2019-01-16 PROCEDURE — G8427 DOCREV CUR MEDS BY ELIG CLIN: HCPCS | Performed by: NURSE PRACTITIONER

## 2019-01-16 PROCEDURE — 1036F TOBACCO NON-USER: CPT | Performed by: NURSE PRACTITIONER

## 2019-01-16 PROCEDURE — G8484 FLU IMMUNIZE NO ADMIN: HCPCS | Performed by: NURSE PRACTITIONER

## 2019-01-16 PROCEDURE — 99214 OFFICE O/P EST MOD 30 MIN: CPT | Performed by: NURSE PRACTITIONER

## 2019-01-16 PROCEDURE — G8417 CALC BMI ABV UP PARAM F/U: HCPCS | Performed by: NURSE PRACTITIONER

## 2019-01-16 ASSESSMENT — ENCOUNTER SYMPTOMS
SINUS PRESSURE: 0
BACK PAIN: 0
CHEST TIGHTNESS: 0
CONSTIPATION: 0
ABDOMINAL PAIN: 0
COUGH: 0
SORE THROAT: 0
EYES NEGATIVE: 1
DIARRHEA: 0
WHEEZING: 0
NAUSEA: 0
SHORTNESS OF BREATH: 0
VOMITING: 0

## 2019-02-13 ENCOUNTER — OFFICE VISIT (OUTPATIENT)
Dept: BARIATRICS/WEIGHT MGMT | Age: 49
End: 2019-02-13
Payer: COMMERCIAL

## 2019-02-13 VITALS
SYSTOLIC BLOOD PRESSURE: 138 MMHG | HEIGHT: 66 IN | DIASTOLIC BLOOD PRESSURE: 84 MMHG | WEIGHT: 315 LBS | BODY MASS INDEX: 50.62 KG/M2 | HEART RATE: 84 BPM

## 2019-02-13 DIAGNOSIS — R73.03 PREDIABETES: ICD-10-CM

## 2019-02-13 DIAGNOSIS — I10 ESSENTIAL HYPERTENSION: Primary | ICD-10-CM

## 2019-02-13 DIAGNOSIS — J45.909 UNCOMPLICATED ASTHMA, UNSPECIFIED ASTHMA SEVERITY, UNSPECIFIED WHETHER PERSISTENT: ICD-10-CM

## 2019-02-13 DIAGNOSIS — G47.33 OBSTRUCTIVE SLEEP APNEA SYNDROME: ICD-10-CM

## 2019-02-13 DIAGNOSIS — E78.00 PURE HYPERCHOLESTEROLEMIA: ICD-10-CM

## 2019-02-13 DIAGNOSIS — E66.01 MORBID OBESITY WITH BMI OF 70 AND OVER, ADULT (HCC): ICD-10-CM

## 2019-02-13 PROCEDURE — G8427 DOCREV CUR MEDS BY ELIG CLIN: HCPCS | Performed by: NURSE PRACTITIONER

## 2019-02-13 PROCEDURE — 99213 OFFICE O/P EST LOW 20 MIN: CPT | Performed by: NURSE PRACTITIONER

## 2019-02-13 PROCEDURE — 1036F TOBACCO NON-USER: CPT | Performed by: NURSE PRACTITIONER

## 2019-02-13 PROCEDURE — G8484 FLU IMMUNIZE NO ADMIN: HCPCS | Performed by: NURSE PRACTITIONER

## 2019-02-13 PROCEDURE — G8417 CALC BMI ABV UP PARAM F/U: HCPCS | Performed by: NURSE PRACTITIONER

## 2019-02-18 ENCOUNTER — OFFICE VISIT (OUTPATIENT)
Dept: PULMONOLOGY | Age: 49
End: 2019-02-18
Payer: COMMERCIAL

## 2019-02-18 VITALS
SYSTOLIC BLOOD PRESSURE: 130 MMHG | WEIGHT: 315 LBS | HEART RATE: 85 BPM | TEMPERATURE: 99.1 F | OXYGEN SATURATION: 96 % | DIASTOLIC BLOOD PRESSURE: 84 MMHG | HEIGHT: 66 IN | RESPIRATION RATE: 18 BRPM | BODY MASS INDEX: 50.62 KG/M2

## 2019-02-18 DIAGNOSIS — G47.33 OSA (OBSTRUCTIVE SLEEP APNEA): Primary | ICD-10-CM

## 2019-02-18 DIAGNOSIS — E66.01 MORBID OBESITY DUE TO EXCESS CALORIES (HCC): ICD-10-CM

## 2019-02-18 DIAGNOSIS — J45.40 MODERATE PERSISTENT ASTHMA, UNSPECIFIED WHETHER COMPLICATED: ICD-10-CM

## 2019-02-18 PROCEDURE — 1036F TOBACCO NON-USER: CPT | Performed by: INTERNAL MEDICINE

## 2019-02-18 PROCEDURE — G8417 CALC BMI ABV UP PARAM F/U: HCPCS | Performed by: INTERNAL MEDICINE

## 2019-02-18 PROCEDURE — 99213 OFFICE O/P EST LOW 20 MIN: CPT | Performed by: INTERNAL MEDICINE

## 2019-02-18 PROCEDURE — G8484 FLU IMMUNIZE NO ADMIN: HCPCS | Performed by: INTERNAL MEDICINE

## 2019-02-18 PROCEDURE — G8427 DOCREV CUR MEDS BY ELIG CLIN: HCPCS | Performed by: INTERNAL MEDICINE

## 2019-03-03 DIAGNOSIS — R06.2 WHEEZING: Primary | ICD-10-CM

## 2019-03-06 RX ORDER — FLUTICASONE FUROATE AND VILANTEROL 200; 25 UG/1; UG/1
1 POWDER RESPIRATORY (INHALATION) DAILY
Qty: 3 EACH | Refills: 5 | Status: SHIPPED | OUTPATIENT
Start: 2019-03-06 | End: 2019-09-19 | Stop reason: SDUPTHER

## 2019-03-07 ENCOUNTER — OFFICE VISIT (OUTPATIENT)
Dept: BARIATRICS/WEIGHT MGMT | Age: 49
End: 2019-03-07
Payer: COMMERCIAL

## 2019-03-07 VITALS
BODY MASS INDEX: 50.62 KG/M2 | HEIGHT: 66 IN | WEIGHT: 315 LBS | SYSTOLIC BLOOD PRESSURE: 128 MMHG | DIASTOLIC BLOOD PRESSURE: 70 MMHG | HEART RATE: 68 BPM

## 2019-03-07 DIAGNOSIS — E66.01 MORBID OBESITY WITH BMI OF 70 AND OVER, ADULT (HCC): ICD-10-CM

## 2019-03-07 DIAGNOSIS — E78.00 PURE HYPERCHOLESTEROLEMIA: ICD-10-CM

## 2019-03-07 DIAGNOSIS — G89.29 CHRONIC LEFT SHOULDER PAIN: ICD-10-CM

## 2019-03-07 DIAGNOSIS — G47.33 OBSTRUCTIVE SLEEP APNEA SYNDROME: ICD-10-CM

## 2019-03-07 DIAGNOSIS — J45.909 UNCOMPLICATED ASTHMA, UNSPECIFIED ASTHMA SEVERITY, UNSPECIFIED WHETHER PERSISTENT: ICD-10-CM

## 2019-03-07 DIAGNOSIS — R73.03 PREDIABETES: ICD-10-CM

## 2019-03-07 DIAGNOSIS — I10 ESSENTIAL HYPERTENSION: Primary | ICD-10-CM

## 2019-03-07 DIAGNOSIS — M25.512 CHRONIC LEFT SHOULDER PAIN: ICD-10-CM

## 2019-03-07 PROCEDURE — G8417 CALC BMI ABV UP PARAM F/U: HCPCS | Performed by: NURSE PRACTITIONER

## 2019-03-07 PROCEDURE — 1036F TOBACCO NON-USER: CPT | Performed by: NURSE PRACTITIONER

## 2019-03-07 PROCEDURE — G8427 DOCREV CUR MEDS BY ELIG CLIN: HCPCS | Performed by: NURSE PRACTITIONER

## 2019-03-07 PROCEDURE — G8484 FLU IMMUNIZE NO ADMIN: HCPCS | Performed by: NURSE PRACTITIONER

## 2019-03-07 PROCEDURE — 99213 OFFICE O/P EST LOW 20 MIN: CPT | Performed by: NURSE PRACTITIONER

## 2019-03-08 ENCOUNTER — ANESTHESIA (OUTPATIENT)
Dept: ENDOSCOPY | Age: 49
End: 2019-03-08
Payer: COMMERCIAL

## 2019-03-08 ENCOUNTER — HOSPITAL ENCOUNTER (OUTPATIENT)
Age: 49
Setting detail: OUTPATIENT SURGERY
Discharge: HOME | End: 2019-03-08
Attending: SURGERY | Admitting: SURGERY
Payer: COMMERCIAL

## 2019-03-08 ENCOUNTER — ANESTHESIA EVENT (OUTPATIENT)
Dept: ENDOSCOPY | Age: 49
End: 2019-03-08
Payer: COMMERCIAL

## 2019-03-08 VITALS
SYSTOLIC BLOOD PRESSURE: 123 MMHG | OXYGEN SATURATION: 94 % | RESPIRATION RATE: 21 BRPM | DIASTOLIC BLOOD PRESSURE: 86 MMHG

## 2019-03-08 VITALS
HEIGHT: 66 IN | HEART RATE: 70 BPM | BODY MASS INDEX: 50.62 KG/M2 | WEIGHT: 315 LBS | DIASTOLIC BLOOD PRESSURE: 70 MMHG | TEMPERATURE: 98.1 F | OXYGEN SATURATION: 95 % | RESPIRATION RATE: 18 BRPM | SYSTOLIC BLOOD PRESSURE: 128 MMHG

## 2019-03-08 PROCEDURE — 2580000003 HC RX 258: Performed by: SURGERY

## 2019-03-08 PROCEDURE — 3700000000 HC ANESTHESIA ATTENDED CARE: Performed by: SURGERY

## 2019-03-08 PROCEDURE — 7100000011 HC PHASE II RECOVERY - ADDTL 15 MIN: Performed by: SURGERY

## 2019-03-08 PROCEDURE — 2500000003 HC RX 250 WO HCPCS: Performed by: NURSE ANESTHETIST, CERTIFIED REGISTERED

## 2019-03-08 PROCEDURE — 7100000010 HC PHASE II RECOVERY - FIRST 15 MIN: Performed by: SURGERY

## 2019-03-08 PROCEDURE — 2580000003 HC RX 258: Performed by: NURSE ANESTHETIST, CERTIFIED REGISTERED

## 2019-03-08 PROCEDURE — 43239 EGD BIOPSY SINGLE/MULTIPLE: CPT | Performed by: SURGERY

## 2019-03-08 PROCEDURE — 3700000001 HC ADD 15 MINUTES (ANESTHESIA): Performed by: SURGERY

## 2019-03-08 PROCEDURE — 6360000002 HC RX W HCPCS: Performed by: NURSE ANESTHETIST, CERTIFIED REGISTERED

## 2019-03-08 PROCEDURE — 88305 TISSUE EXAM BY PATHOLOGIST: CPT

## 2019-03-08 PROCEDURE — 2709999900 HC NON-CHARGEABLE SUPPLY: Performed by: SURGERY

## 2019-03-08 PROCEDURE — 3609012400 HC EGD TRANSORAL BIOPSY SINGLE/MULTIPLE: Performed by: SURGERY

## 2019-03-08 RX ORDER — SODIUM CHLORIDE 9 MG/ML
INJECTION, SOLUTION INTRAVENOUS CONTINUOUS
Status: DISCONTINUED | OUTPATIENT
Start: 2019-03-08 | End: 2019-03-08 | Stop reason: HOSPADM

## 2019-03-08 RX ORDER — SODIUM CHLORIDE, SODIUM LACTATE, POTASSIUM CHLORIDE, CALCIUM CHLORIDE 600; 310; 30; 20 MG/100ML; MG/100ML; MG/100ML; MG/100ML
INJECTION, SOLUTION INTRAVENOUS CONTINUOUS PRN
Status: DISCONTINUED | OUTPATIENT
Start: 2019-03-08 | End: 2019-03-08 | Stop reason: SDUPTHER

## 2019-03-08 RX ORDER — PROPOFOL 10 MG/ML
INJECTION, EMULSION INTRAVENOUS PRN
Status: DISCONTINUED | OUTPATIENT
Start: 2019-03-08 | End: 2019-03-08 | Stop reason: SDUPTHER

## 2019-03-08 RX ORDER — OMEPRAZOLE 20 MG/1
20 CAPSULE, DELAYED RELEASE ORAL DAILY
Qty: 30 CAPSULE | Refills: 3 | Status: SHIPPED | OUTPATIENT
Start: 2019-03-08 | End: 2019-03-08 | Stop reason: SDUPTHER

## 2019-03-08 RX ORDER — OMEPRAZOLE 20 MG/1
20 CAPSULE, DELAYED RELEASE ORAL DAILY
Qty: 30 CAPSULE | Refills: 3 | Status: SHIPPED | OUTPATIENT
Start: 2019-03-08 | End: 2019-07-10

## 2019-03-08 RX ORDER — KETAMINE HYDROCHLORIDE 10 MG/ML
INJECTION, SOLUTION INTRAMUSCULAR; INTRAVENOUS PRN
Status: DISCONTINUED | OUTPATIENT
Start: 2019-03-08 | End: 2019-03-08 | Stop reason: SDUPTHER

## 2019-03-08 RX ADMIN — SODIUM CHLORIDE: 9 INJECTION, SOLUTION INTRAVENOUS at 08:50

## 2019-03-08 RX ADMIN — PROPOFOL 50 MG: 10 INJECTION, EMULSION INTRAVENOUS at 09:56

## 2019-03-08 RX ADMIN — SODIUM CHLORIDE, POTASSIUM CHLORIDE, SODIUM LACTATE AND CALCIUM CHLORIDE: 600; 310; 30; 20 INJECTION, SOLUTION INTRAVENOUS at 09:47

## 2019-03-08 RX ADMIN — KETAMINE HYDROCHLORIDE 5 MG: 10 INJECTION INTRAMUSCULAR; INTRAVENOUS at 09:56

## 2019-03-08 RX ADMIN — KETAMINE HYDROCHLORIDE 5 MG: 10 INJECTION INTRAMUSCULAR; INTRAVENOUS at 09:53

## 2019-03-08 RX ADMIN — PROPOFOL 25 MG: 10 INJECTION, EMULSION INTRAVENOUS at 09:57

## 2019-03-08 RX ADMIN — PROPOFOL 100 MG: 10 INJECTION, EMULSION INTRAVENOUS at 09:50

## 2019-03-08 RX ADMIN — KETAMINE HYDROCHLORIDE 10 MG: 10 INJECTION INTRAMUSCULAR; INTRAVENOUS at 09:50

## 2019-03-08 RX ADMIN — PROPOFOL 50 MG: 10 INJECTION, EMULSION INTRAVENOUS at 09:53

## 2019-03-08 RX ADMIN — KETAMINE HYDROCHLORIDE 2.5 MG: 10 INJECTION INTRAMUSCULAR; INTRAVENOUS at 09:57

## 2019-03-08 ASSESSMENT — PAIN SCALES - GENERAL
PAINLEVEL_OUTOF10: 0

## 2019-03-08 ASSESSMENT — PAIN - FUNCTIONAL ASSESSMENT: PAIN_FUNCTIONAL_ASSESSMENT: 0-10

## 2019-03-11 LAB — SURGICAL PATHOLOGY REPORT: NORMAL

## 2019-03-11 RX ORDER — TETRACYCLINE HYDROCHLORIDE 500 MG/1
500 CAPSULE ORAL 3 TIMES DAILY
Qty: 30 CAPSULE | Refills: 0 | Status: SHIPPED | OUTPATIENT
Start: 2019-03-11 | End: 2019-07-10 | Stop reason: ALTCHOICE

## 2019-03-11 RX ORDER — METRONIDAZOLE 250 MG/1
250 TABLET ORAL 4 TIMES DAILY
Qty: 40 TABLET | Refills: 0 | Status: SHIPPED | OUTPATIENT
Start: 2019-03-11 | End: 2019-03-21

## 2019-03-11 RX ORDER — BISMUTH SUBSALICYLATE 262 MG/1
524 TABLET, CHEWABLE ORAL 4 TIMES DAILY
Qty: 112 TABLET | Refills: 0 | Status: SHIPPED | OUTPATIENT
Start: 2019-03-11 | End: 2019-03-25

## 2019-04-04 ENCOUNTER — OFFICE VISIT (OUTPATIENT)
Dept: BARIATRICS/WEIGHT MGMT | Age: 49
End: 2019-04-04
Payer: COMMERCIAL

## 2019-04-04 VITALS
RESPIRATION RATE: 20 BRPM | BODY MASS INDEX: 50.62 KG/M2 | HEIGHT: 66 IN | SYSTOLIC BLOOD PRESSURE: 110 MMHG | WEIGHT: 315 LBS | DIASTOLIC BLOOD PRESSURE: 82 MMHG | HEART RATE: 80 BPM

## 2019-04-04 DIAGNOSIS — E78.00 PURE HYPERCHOLESTEROLEMIA: ICD-10-CM

## 2019-04-04 DIAGNOSIS — K30 INDIGESTION: ICD-10-CM

## 2019-04-04 DIAGNOSIS — G47.33 OBSTRUCTIVE SLEEP APNEA SYNDROME: ICD-10-CM

## 2019-04-04 DIAGNOSIS — R73.03 PREDIABETES: ICD-10-CM

## 2019-04-04 DIAGNOSIS — A04.8 POSITIVE H. PYLORI TEST: ICD-10-CM

## 2019-04-04 DIAGNOSIS — I10 ESSENTIAL HYPERTENSION: Primary | ICD-10-CM

## 2019-04-04 DIAGNOSIS — E66.01 MORBID OBESITY WITH BMI OF 70 AND OVER, ADULT (HCC): ICD-10-CM

## 2019-04-04 DIAGNOSIS — J45.909 UNCOMPLICATED ASTHMA, UNSPECIFIED ASTHMA SEVERITY, UNSPECIFIED WHETHER PERSISTENT: ICD-10-CM

## 2019-04-04 PROCEDURE — G8417 CALC BMI ABV UP PARAM F/U: HCPCS | Performed by: NURSE PRACTITIONER

## 2019-04-04 PROCEDURE — 99213 OFFICE O/P EST LOW 20 MIN: CPT | Performed by: NURSE PRACTITIONER

## 2019-04-04 PROCEDURE — G8427 DOCREV CUR MEDS BY ELIG CLIN: HCPCS | Performed by: NURSE PRACTITIONER

## 2019-04-04 PROCEDURE — 1036F TOBACCO NON-USER: CPT | Performed by: NURSE PRACTITIONER

## 2019-04-04 RX ORDER — AMLODIPINE BESYLATE 5 MG/1
TABLET ORAL
Refills: 0 | COMMUNITY
Start: 2019-03-07 | End: 2019-07-15 | Stop reason: SINTOL

## 2019-04-04 RX ORDER — METRONIDAZOLE 500 MG/1
TABLET ORAL
Refills: 0 | COMMUNITY
Start: 2019-03-11 | End: 2019-07-10 | Stop reason: ALTCHOICE

## 2019-04-04 NOTE — PROGRESS NOTES
Medical Weight Management Progress Note    Subjective     Patient being seen for medically supervised weight loss for the chronic conditions of HTN, TASHA, HLD, Prediabetes. He is working on the behavior changes discussed at the initial appointment. Patient continues on diet plan. Physical activity includes walking . Weight loss of 5 lbs since last visit. Using CPAP. Psych eval report received and clearance received. EGD completed and H Pylori positive. Currently using treatment regimen. Had cardiac stress test done and pending clearance. No current issues. Working toward bariatric surgery:    [x] Sleeve Gastrectomy                                                           [] Pipo-en-Y Gastric Bypass    Allergies: Allergies   Allergen Reactions    Pcn [Penicillins] Anaphylaxis    Lidocaine      Pt unsure of reaction,  Happened as a child       Past Medical History:     Past Medical History:   Diagnosis Date    Allergic rhinitis     Asthma     Moderate persistent asthma, unspecified whether complicated    Hypertension     Obesity     Positive H. pylori test     Post-nasal drip     Sprain, lumbar     Unspecified sleep apnea     TASHA (obstructive sleep apnea)   .     Past Surgical History:  Past Surgical History:   Procedure Laterality Date    HEEL SPUR SURGERY      UPPER GASTROINTESTINAL ENDOSCOPY N/A 3/8/2019    EGD BIOPSY performed by Yris Scherer DO at [facility]       Family History:  Family History   Problem Relation Age of Onset    Cancer Maternal Grandfather         lung    Broken Bones Paternal Grandmother         hip    Glaucoma Paternal Grandfather     Diabetes Mother     High Cholesterol Mother     Mental Illness Mother     Heart Disease Mother     Other Mother         GERD, TASHA    High Blood Pressure Mother     Depression Mother     High Cholesterol Father     Diabetes Father     High Blood Pressure Father     Asthma Father    Verba Bright Other Father MG delayed release capsule Take 1 capsule by mouth daily 30 capsule 3    Fluticasone Furoate-Vilanterol (BREO ELLIPTA) 200-25 MCG/INH AEPB Inhale 1 puff into the lungs daily 3 each 5    gabapentin (NEURONTIN) 300 MG capsule Take 1 capsule by mouth 3 times daily. . 90 capsule 2    metFORMIN (GLUCOPHAGE XR) 500 MG extended release tablet Take 1 tablet by mouth 2 times daily (before meals) 60 tablet 5    albuterol sulfate HFA (PROAIR HFA) 108 (90 Base) MCG/ACT inhaler Inhale 2 puffs into the lungs every 6 hours as needed for Wheezing or Shortness of Breath 8.5 g 0    traZODone (DESYREL) 100 MG tablet Take 1 tablet by mouth nightly 30 tablet 5    cyclobenzaprine (FLEXERIL) 10 MG tablet Take 1 tablet by mouth 3 times daily as needed for Muscle spasms 60 tablet 0    fluticasone (FLONASE) 50 MCG/ACT nasal spray 2 sprays by Nasal route daily 1 Bottle 11    Naproxen Sodium (ALEVE) 220 MG CAPS Take 440 mg by mouth daily as needed for Pain 60 capsule      No current facility-administered medications for this visit. Vital Signs:  /82 (Site: Right Lower Arm, Position: Sitting, Cuff Size: Large Adult)   Pulse 80   Resp 20   Ht 5' 5.5\" (1.664 m)   Wt (!) 442 lb (200.5 kg)   BMI 72.43 kg/m²     BMI/Height/Weight:  Body mass index is 72.43 kg/m². Review of Systems - A review of systems was performed. All was negative unless otherwise documented in HPI. Constitutional: Negative for fever, chills and diaphoresis. HENT: Negative for hearing loss and trouble swallowing. Eyes: Negative for photophobia and visual disturbance. Respiratory: Negative for cough, shortness of breath and wheezing. Cardiovascular: Negative for chest pain and palpitations. Gastrointestinal: Negative for nausea, vomiting, abdominal pain, diarrhea, constipation, blood in stool and abdominal distention. Endocrine: Negative for polydipsia, polyphagia and polyuria.    Genitourinary: Negative for dysuria, frequency, hematuria and difficulty urinating. Musculoskeletal: Negative for myalgias, joint swelling. Skin: Negative for pallor and rash. Neurological: Negative for dizziness, tremors, light-headedness and headaches. Psychiatric/Behavioral: Negative for sleep disturbance and dysphoric mood. Objective:      Physical Exam   Vital signs reviewed. General: Well-developed and well-nourished. No acute distress. Skin: Warm, dry and intact. HEENT: Normocephalic. EOMs intact. Conjunctivae normal. Neck supple. Cardiovascular: Normal rate, regular rhythm. Pulmonary/Chest: Normal effort. Lungs clear to auscultation. No rales, rhonchi or wheezing. Abdominal: Positive bowel sounds. Soft, nontender. Nondistended. Musculoskeletal: Movement x4. No edema. Neurological: Gait normal. Alert and oriented to person, place, and time. Psychiatric: Normal mood and affect. Speech and behavior normal. Judgment and thought content normal. Cognition and memory intact. Assessment:       Diagnosis Orders   1. Essential hypertension     2. Positive H. pylori test  H. Pylori Breath Test   3. Prediabetes     4. Obstructive sleep apnea syndrome     5. Pure hypercholesterolemia     6. Uncomplicated asthma, unspecified asthma severity, unspecified whether persistent     7. Indigestion     8. Morbid obesity with BMI of 70 and over, adult [facility])         Plan:    Dietitian visit today. Patient was encouraged to journal all food intake. Keep calorie level at approximately 7644-4449. Protein intake is to be a minimum of 60-80 grams per day. Water drinking was encouraged with a goal of 64oz-128oz daily. Beverages to be calorie free except for milk. Every other beverage should be water. Avoid soda. Continue to increase level of physical activity. Encouraged use of exercise log. H Pylori breath test ordered to be done 2 weeks after therapy completed. Follow-up  Return in about 1 month (around 5/4/2019).     Orders this encounter:  Orders Placed This Encounter   Procedures    H. Pylori Breath Test     Standing Status:   Future     Standing Expiration Date:   4/4/2020       Prescriptions this encounter:  No orders of the defined types were placed in this encounter.       Electronically signed by:  Gracia Sheikh CNP

## 2019-04-17 ENCOUNTER — HOSPITAL ENCOUNTER (OUTPATIENT)
Dept: CARDIAC CATH/INVASIVE PROCEDURES | Age: 49
Discharge: HOME | End: 2019-04-17
Payer: COMMERCIAL

## 2019-04-17 VITALS
WEIGHT: 315 LBS | RESPIRATION RATE: 16 BRPM | HEART RATE: 76 BPM | BODY MASS INDEX: 50.62 KG/M2 | HEIGHT: 66 IN | TEMPERATURE: 96.2 F | SYSTOLIC BLOOD PRESSURE: 125 MMHG | OXYGEN SATURATION: 97 % | DIASTOLIC BLOOD PRESSURE: 71 MMHG

## 2019-04-17 LAB
GFR NON-AFRICAN AMERICAN: >60 ML/MIN
GFR SERPL CREATININE-BSD FRML MDRD: >60 ML/MIN
GFR SERPL CREATININE-BSD FRML MDRD: NORMAL ML/MIN/{1.73_M2}
GLUCOSE BLD-MCNC: 109 MG/DL (ref 74–100)
PLATELET # BLD: 241 K/UL (ref 138–453)
POC CHLORIDE: 104 MMOL/L (ref 98–107)
POC CREATININE: 0.85 MG/DL (ref 0.51–1.19)
POC HEMATOCRIT: 54 % (ref 41–53)
POC HEMOGLOBIN: 18.3 G/DL (ref 13.5–17.5)
POC LACTIC ACID: 1.76 MMOL/L (ref 0.56–1.39)
POC POTASSIUM: 4.2 MMOL/L (ref 3.5–4.5)
POC SODIUM: 142 MMOL/L (ref 138–146)

## 2019-04-17 PROCEDURE — 84295 ASSAY OF SERUM SODIUM: CPT

## 2019-04-17 PROCEDURE — 2709999900 HC NON-CHARGEABLE SUPPLY

## 2019-04-17 PROCEDURE — 82565 ASSAY OF CREATININE: CPT

## 2019-04-17 PROCEDURE — 6360000002 HC RX W HCPCS

## 2019-04-17 PROCEDURE — 82947 ASSAY GLUCOSE BLOOD QUANT: CPT

## 2019-04-17 PROCEDURE — C1725 CATH, TRANSLUMIN NON-LASER: HCPCS

## 2019-04-17 PROCEDURE — 2500000003 HC RX 250 WO HCPCS

## 2019-04-17 PROCEDURE — 82435 ASSAY OF BLOOD CHLORIDE: CPT

## 2019-04-17 PROCEDURE — 83605 ASSAY OF LACTIC ACID: CPT

## 2019-04-17 PROCEDURE — 84132 ASSAY OF SERUM POTASSIUM: CPT

## 2019-04-17 PROCEDURE — 6360000004 HC RX CONTRAST MEDICATION

## 2019-04-17 PROCEDURE — C1894 INTRO/SHEATH, NON-LASER: HCPCS

## 2019-04-17 PROCEDURE — C1769 GUIDE WIRE: HCPCS

## 2019-04-17 PROCEDURE — 7100000010 HC PHASE II RECOVERY - FIRST 15 MIN

## 2019-04-17 PROCEDURE — 85049 AUTOMATED PLATELET COUNT: CPT

## 2019-04-17 PROCEDURE — 7100000011 HC PHASE II RECOVERY - ADDTL 15 MIN

## 2019-04-17 PROCEDURE — 85014 HEMATOCRIT: CPT

## 2019-04-17 PROCEDURE — 93454 CORONARY ARTERY ANGIO S&I: CPT | Performed by: INTERNAL MEDICINE

## 2019-04-17 RX ORDER — SODIUM CHLORIDE 0.9 % (FLUSH) 0.9 %
10 SYRINGE (ML) INJECTION EVERY 12 HOURS SCHEDULED
Status: CANCELLED | OUTPATIENT
Start: 2019-04-17

## 2019-04-17 RX ORDER — SODIUM CHLORIDE 0.9 % (FLUSH) 0.9 %
10 SYRINGE (ML) INJECTION PRN
Status: CANCELLED | OUTPATIENT
Start: 2019-04-17

## 2019-04-17 RX ORDER — ACETAMINOPHEN 325 MG/1
650 TABLET ORAL EVERY 4 HOURS PRN
Status: CANCELLED | OUTPATIENT
Start: 2019-04-17

## 2019-04-17 RX ORDER — SODIUM CHLORIDE 9 MG/ML
INJECTION, SOLUTION INTRAVENOUS CONTINUOUS
Status: DISCONTINUED | OUTPATIENT
Start: 2019-04-17 | End: 2019-04-18 | Stop reason: HOSPADM

## 2019-04-17 RX ORDER — ONDANSETRON 2 MG/ML
4 INJECTION INTRAMUSCULAR; INTRAVENOUS EVERY 6 HOURS PRN
Status: CANCELLED | OUTPATIENT
Start: 2019-04-17

## 2019-04-17 RX ORDER — SODIUM CHLORIDE 9 MG/ML
INJECTION, SOLUTION INTRAVENOUS CONTINUOUS
Status: CANCELLED | OUTPATIENT
Start: 2019-04-17 | End: 2019-04-20

## 2019-04-17 RX ADMIN — SODIUM CHLORIDE: 9 INJECTION, SOLUTION INTRAVENOUS at 12:18

## 2019-04-17 NOTE — PROGRESS NOTES
Patient admitted, consent signed and questions answered. Patient ready for procedure. Call light to reach with side rails up 2 of 2. Bilat groin hairs clipped. Mom at bedside with patient. History and physical needed.

## 2019-04-17 NOTE — PROGRESS NOTES
Received post cath procedure to [facility] room 10. Assessment obtained. Restrictions reviewed with patient. Post procedure pathway initiated. wrist site soft , band aid dry and intact. No hematoma noted. Family at side. Patient without complaints.

## 2019-04-17 NOTE — OP NOTE
[facility]    CARDIAC CATHETERIZATION    Date:   4/17/2019  Patient name: Marisol Connell  Date of admission:  4/17/2019 11:35 AM  MRN:   [id number]  YOB: 1970    Operators:  Primary: Antoinette Foley MD  CV Fellow:  Vidya Jennings MD    Pre Procedure Conscious Sedation Data:    ASA Class:    [] I [x] II [] III [] IV    Mallampati Class:  [] I [x] II [] III [] IV     Indication:  [] STEMI      [x] + Stress test  [] ACS      [] + EKG Changes  [] Non Q MI       [] Significant Risk Factors  [] Recurrent Angina             [] Diabetes Mellitus    [] New LBBB      [] Uncontrolled HTN. [] CHF / Low EF changes     [] Abnormal CTA / Ca Score    Procedure:  Access:  [] Femoral  [x] Radial  artery       [x] Right  [] Left    Procedure: After informed consent was obtained with explanation of the risks and benefits, the patient was brought to the cath lab. The access area was prepped and draped in sterile fashion. 1% lidocaine was used for local block. The artery was cannulated with a 6  Fr sheath with brisk arterial blood return. The side port was frequently flushed and aspirated with normal saline. Findings:  Left main: Mild disease  LAD: Mild disease  LCX: Mild disease  RCA: Mild disease  The LV gram was not performed. Conclusions:  1. Normal / Non obstructive CAD    Recommendation:  1. Ok to proceed with bariatric surgery, with low cardiac risk  2. Medical treatments  3. Risk factor modification    History and Risk Factors    [x] Hypertension     [] Family history of CAD  [x] Hyperlipidemia     [] Cerebrovascular Disease   [] Prior MI       [] Peripheral Vascular disease   [] Prior PCI              [] Diabetes Mellitus    [] Left Main PCI. [] Currently on Dialysis. [] Prior CABG. [] Currently smoker. [] Cardiac Arrest outside of healthcare facility. Witnessed     [] Yes   [] No     Arrest after arrival of EMS  [] Yes   [] No   [] Cardiac Arrest at other Facility.     [] Yes   [] No    Pre-Procedure Information. Heart Failure       [] Yes    [] No    Class  [] I      [] II  [] III    [] IV. New Diagnosis    [] Yes  [] No    HF Type      [] Systolic   [] Diastolic          [] Unknown. Diagnostic Test:   EKG       [] Normal   [] Abnormal    New antiarrhythmia medications:    [] Yes   [] No   New onset atrial fibrillation / Flutter     [] Yes   [] No   ECG Abnormalities:      [] V. Fib   [] Sadia V. Tach           [] NS V. T   [] New LBBB           [] T. Inv  []  ST dev > 0.5 mm         [] PVC's freq  [] PVC's infrequent  Stress Test:   Type:    [] Stress Echo   [] Exercise Stress Test (no imaging)      [x] Stress Nuclear  [] Stress Imaging   Results  [] Negative   [x] Positive        [] Indeterminate  [] Unavailable     If Positive/ Risk / Extent of Ischemia:       [] Low  [] Intermediate         [] High  [] Unavailable      Cardiac CTA Performed:   Results   [] CAD   [] Non obstructive CAD      [] No CAD   [] Uncertain      [] Unknown   [] Structural Disease. Pre Procedure Medications:      [] ASA [] Beta Blockers      [] Nitrate [] Ca Channel Blockers      [] Ranolazine [] Statin       [] Plavix/Others antiplatelets        Electronically signed by Lainey Solis MD on 4/17/2019 at 3:42 PM  Cardiology Fellow    Pamela Gillis MD, Attending physician  [facility]

## 2019-04-17 NOTE — H&P
Attestation signed by      Attending Physician Statement:    I have discussed the care of  Ivania Benavidez , including pertinent history and exam findings, with the Cardiology fellow/resident. I have seen and examined the patient and the key elements of all parts of the encounter have been performed by me. I agree with the assessment, plan and orders as documented by the fellow/resident, after I modified exam findings and plan of treatments, and the final version is my approved version of the assessment. Additional Comments: [facility]    H&P               Today's Date: 4/17/2019  Patient Name: Ivania Benavidez  Date of admission: No admission date for patient encounter. Patient's age: 52 y.o., 1970  Admission Dx: No admission diagnoses are documented for this encounter. Reason for Consult:  Cardiac evaluation    Requesting Physician: No admitting provider for patient encounter. CHIEF COMPLAINT:  Positive stress test    History Obtained From:  patient, electronic medical record    HISTORY OF PRESENT ILLNESS:      The patient is a 52 y.o.  male who presents for elective cardiac cath. He presented for pre-op risk assessment prior to bariatric surgery. Had stress test which was positive as below. Denies any chest pain, dyspnea, palpitations, lightheadedness, dizziness, PND or orthopnea. Past Medical History:   has a past medical history of Allergic rhinitis, Asthma, Hypertension, Obesity, Positive H. pylori test, Post-nasal drip, Sprain, lumbar, and Unspecified sleep apnea. Past Surgical History:   has a past surgical history that includes Heel spur surgery and Upper gastrointestinal endoscopy (N/A, 3/8/2019). Home Medications:    Prior to Admission medications    Medication Sig Start Date End Date Taking?  Authorizing Provider   amLODIPine (NORVASC) 5 MG tablet take 1 tablet by mouth once daily 3/7/19   Historical Provider, MD   metroNIDAZOLE (FLAGYL) 500 MG tablet take 1/2 tablet by mouth four times a day for 10 days 3/11/19   Historical Provider, MD   tetracycline (ACHROMYCIN;SUMYCIN) 500 MG capsule Take 1 capsule by mouth 3 times daily 3/11/19   Zulema Conner DO   omeprazole (PRILOSEC) 20 MG delayed release capsule Take 1 capsule by mouth daily 3/8/19   Shay Chester MD   Fluticasone Furoate-Vilanterol (BREO ELLIPTA) 200-25 MCG/INH AEPB Inhale 1 puff into the lungs daily 3/6/19 3/6/20  LAMONT Mahajan CNP   traZODone (DESYREL) 100 MG tablet Take 1 tablet by mouth nightly 1/8/19 1/8/20  LAMONT Araujo CNP   cyclobenzaprine (FLEXERIL) 10 MG tablet Take 1 tablet by mouth 3 times daily as needed for Muscle spasms 12/4/18 12/4/19  LAMONT Mahajan CNP   fluticasone [facility]) 50 MCG/ACT nasal spray 2 sprays by Nasal route daily 11/26/18 11/26/19  Latosha Watts MD   gabapentin (NEURONTIN) 300 MG capsule Take 1 capsule by mouth 3 times daily. . 10/24/18 10/24/19  LAMONT Mahajan CNP   Naproxen Sodium (ALEVE) 220 MG CAPS Take 440 mg by mouth daily as needed for Pain 4/10/18 4/10/19  LAMONT Mahajan CNP   metFORMIN (GLUCOPHAGE XR) 500 MG extended release tablet Take 1 tablet by mouth 2 times daily (before meals) 4/10/18 4/10/19  LAMONT Mahajan CNP   albuterol sulfate HFA (PROAIR HFA) 108 (90 Base) MCG/ACT inhaler Inhale 2 puffs into the lungs every 6 hours as needed for Wheezing or Shortness of Breath 2/28/18 4/4/19  LAMONT Mahajan CNP      Current Facility-Administered Medications: 0.9 % sodium chloride infusion, , Intravenous, Continuous    Allergies:  Pcn [penicillins] and Lidocaine    Social History:   reports that he has never smoked. He has never used smokeless tobacco. He reports that he does not drink alcohol or use drugs.      Family History: family history includes Asthma in his father; Broken Bones in his paternal grandmother; Cancer in his maternal grandfather; Cancer (age of onset: 48) in his sister; Depression in his mother; Diabetes in his father and mother; Glaucoma in his paternal grandfather; Heart Disease in his mother; High Blood Pressure in his father and mother; High Cholesterol in his father and mother; Mental Illness in his mother; Other in his father and mother. No h/o sudden cardiac death. No for premature CAD    REVIEW OF SYSTEMS:    · Constitutional: there has been no unanticipated weight loss. There's been No change in energy level, No change in activity level. · Eyes: No visual changes or diplopia. No scleral icterus. · ENT: No Headaches  · Cardiovascular: No cardiac history  · Respiratory: No previous pulmonary problems, No cough  · Gastrointestinal: No abdominal pain. No change in bowel or bladder habits. · Genitourinary: No dysuria, trouble voiding, or hematuria. · Musculoskeletal:  No gait disturbance, No weakness or joint complaints. · Integumentary: No rash or pruritis. · Neurological: No headache, diplopia, change in muscle strength, numbness or tingling. No change in gait, balance, coordination, mood, affect, memory, mentation, behavior. · Psychiatric: No anxiety, or depression. · Endocrine: No temperature intolerance. No excessive thirst, fluid intake, or urination. No tremor. · Hematologic/Lymphatic: No abnormal bruising or bleeding, blood clots or swollen lymph nodes. · Allergic/Immunologic: No nasal congestion or hives. PHYSICAL EXAM:      There were no vitals taken for this visit. Constitutional and General Appearance: alert, cooperative, no distress and appears stated age  [de-identified]: PERRL, no cervical lymphadenopathy. No masses palpable. Normal oral mucosa  Respiratory:  · Normal excursion and expansion without use of accessory muscles  · Resp Auscultation: Good respiratory effort. No for increased work of breathing.  On auscultation: clear to auscultation bilaterally  Cardiovascular:  · The apical impulse is not displaced  · Heart tones are crisp and normal. regular S1 and S2.  · Jugular venous pulsation Normal  · The carotid upstroke is normal in amplitude and contour without delay or bruit  · Peripheral pulses are symmetrical and full   Abdomen:   · No masses or tenderness  · Bowel sounds present  Extremities:  ·  No Cyanosis or Clubbing  ·  Lower extremity edema: No  ·  Skin: Warm and dry  Neurological:  · Alert and oriented. · Moves all extremities well  · No abnormalities of mood, affect, memory, mentation, or behavior are noted    DATA:    Diagnostics:      EKG:   NSR, low voltage. Lexiscan Stress Test @ [facility] (3/28/19): Anteroapical ischemia  LVEF 55%    ECHO @ [facility] (3/29/19)::   LVEF 55-60%, no abnormality noted    Labs:     CBC: No results for input(s): WBC, HGB, HCT, PLT in the last 72 hours. BMP: No results for input(s): NA, K, CO2, BUN, CREATININE, LABGLOM, GLUCOSE in the last 72 hours. BNP: No results for input(s): BNP in the last 72 hours. PT/INR: No results for input(s): PROTIME, INR in the last 72 hours. APTT:No results for input(s): APTT in the last 72 hours. CARDIAC ENZYMES:No results for input(s): CKTOTAL, CKMB, CKMBINDEX, TROPONINI in the last 72 hours. FASTING LIPID PANEL:  Lab Results   Component Value Date    HDL 38 10/26/2018    LDLCALC 120 06/05/2015    TRIG 129 10/26/2018     LIVER PROFILE:No results for input(s): AST, ALT, LABALBU in the last 72 hours. IMPRESSION:    1. Positive stress test as above  2. Morbid obesity under consideration for bariatric surgery  3. Primary HTN  4. Pre-diabetes  5. Dyslipidemia  6. TASHA    RECOMMENDATIONS:  1. Will proceed with cardiac cath  2. Further recs post cath    Will discuss with attending Dr. Emir Geronimo for final recommendations.     Vasquez Hill MD  Cardiology Fellow

## 2019-05-02 ENCOUNTER — OFFICE VISIT (OUTPATIENT)
Dept: BARIATRICS/WEIGHT MGMT | Age: 49
End: 2019-05-02
Payer: COMMERCIAL

## 2019-05-02 VITALS
BODY MASS INDEX: 50.62 KG/M2 | WEIGHT: 315 LBS | DIASTOLIC BLOOD PRESSURE: 80 MMHG | SYSTOLIC BLOOD PRESSURE: 134 MMHG | HEART RATE: 77 BPM | HEIGHT: 66 IN

## 2019-05-02 DIAGNOSIS — I10 ESSENTIAL HYPERTENSION: Primary | ICD-10-CM

## 2019-05-02 DIAGNOSIS — K30 INDIGESTION: ICD-10-CM

## 2019-05-02 DIAGNOSIS — E66.01 MORBID OBESITY WITH BMI OF 70 AND OVER, ADULT (HCC): ICD-10-CM

## 2019-05-02 DIAGNOSIS — R73.03 PREDIABETES: ICD-10-CM

## 2019-05-02 DIAGNOSIS — G47.33 OBSTRUCTIVE SLEEP APNEA SYNDROME: ICD-10-CM

## 2019-05-02 DIAGNOSIS — E78.00 PURE HYPERCHOLESTEROLEMIA: ICD-10-CM

## 2019-05-02 DIAGNOSIS — J45.909 UNCOMPLICATED ASTHMA, UNSPECIFIED ASTHMA SEVERITY, UNSPECIFIED WHETHER PERSISTENT: ICD-10-CM

## 2019-05-02 PROCEDURE — 99213 OFFICE O/P EST LOW 20 MIN: CPT | Performed by: NURSE PRACTITIONER

## 2019-05-02 PROCEDURE — G8427 DOCREV CUR MEDS BY ELIG CLIN: HCPCS | Performed by: NURSE PRACTITIONER

## 2019-05-02 PROCEDURE — G8417 CALC BMI ABV UP PARAM F/U: HCPCS | Performed by: NURSE PRACTITIONER

## 2019-05-02 PROCEDURE — 1036F TOBACCO NON-USER: CPT | Performed by: NURSE PRACTITIONER

## 2019-05-02 NOTE — PROGRESS NOTES
Medical Weight Management Progress Note    Subjective     Patient being seen for medically supervised weight loss for the chronic conditions of HTN, TASHA, HLD, Prediabetes. He is working on the behavior changes discussed at the initial appointment. Patient continues on diet plan. Physical activity includes walking . Weight loss of 6 lbs since last visit. Using CPAP. Psych eval report received and clearance received. EGD completed and H Pylori positive. Finished treatment regimen. Needs repeat H Pylori breath test.  Has order. Pending cardiac clearance. No current issues. Working toward bariatric surgery:    [x] Sleeve Gastrectomy                                                           [] Pipo-en-Y Gastric Bypass    Allergies: Allergies   Allergen Reactions    Pcn [Penicillins] Anaphylaxis    Lidocaine      Pt unsure of reaction,  Happened as a child       Past Medical History:     Past Medical History:   Diagnosis Date    Abnormal stress test 03/2019    Allergic rhinitis     Asthma     Moderate persistent asthma, unspecified whether complicated    Hypertension     Obesity     Positive H. pylori test     Post-nasal drip     Sprain, lumbar     Unspecified sleep apnea     TASHA (obstructive sleep apnea) CPAP NIGHTLY   .     Past Surgical History:  Past Surgical History:   Procedure Laterality Date    CARDIAC CATHETERIZATION  04/17/2019    HEEL SPUR SURGERY      UPPER GASTROINTESTINAL ENDOSCOPY N/A 3/8/2019    [facility] BIOPSY [facility]       Family History:  Family History   Problem Relation Age of Onset    Cancer Maternal Grandfather         lung    Broken Bones Paternal Grandmother         hip    Glaucoma Paternal Grandfather     Diabetes Mother     High Cholesterol Mother     Mental Illness Mother     Heart Disease Mother     Other Mother         GERD, TASHA    High Blood Pressure Mother     Depression Mother     High Cholesterol Father  Diabetes Father     High Blood Pressure Father     Asthma Father     Other Father         TASHA, BPH, gout    Cancer Sister 48        unsure of name, lived 2 weeks after diagnosis       Social History:  Social History     Socioeconomic History    Marital status: Legally      Spouse name: Not on file    Number of children: Not on file    Years of education: Not on file    Highest education level: Not on file   Occupational History    Not on file   Social Needs    Financial resource strain: Not on file    Food insecurity:     Worry: Not on file     Inability: Not on file    Transportation needs:     Medical: Not on file     Non-medical: Not on file   Tobacco Use    Smoking status: Never Smoker    Smokeless tobacco: Never Used   Substance and Sexual Activity    Alcohol use:  Yes     Alcohol/week: 0.0 oz     Comment: rarely    Drug use: No    Sexual activity: Yes     Partners: Female     Comment: girlfriend had tubal   Lifestyle    Physical activity:     Days per week: Not on file     Minutes per session: Not on file    Stress: Not on file   Relationships    Social connections:     Talks on phone: Not on file     Gets together: Not on file     Attends Taoism service: Not on file     Active member of club or organization: Not on file     Attends meetings of clubs or organizations: Not on file     Relationship status: Not on file    Intimate partner violence:     Fear of current or ex partner: Not on file     Emotionally abused: Not on file     Physically abused: Not on file     Forced sexual activity: Not on file   Other Topics Concern    Not on file   Social History Narrative    Not on file       Current Medications:  Current Outpatient Medications   Medication Sig Dispense Refill    metroNIDAZOLE (FLAGYL) 500 MG tablet take 1/2 tablet by mouth four times a day for 10 days  0    Fluticasone Furoate-Vilanterol (BREO ELLIPTA) 200-25 MCG/INH AEPB Inhale 1 puff into the lungs daily 3 each 5    amLODIPine (NORVASC) 5 MG tablet take 1 tablet by mouth once daily  0    tetracycline (ACHROMYCIN;SUMYCIN) 500 MG capsule Take 1 capsule by mouth 3 times daily 30 capsule 0    omeprazole (PRILOSEC) 20 MG delayed release capsule Take 1 capsule by mouth daily 30 capsule 3    traZODone (DESYREL) 100 MG tablet Take 1 tablet by mouth nightly 30 tablet 5    cyclobenzaprine (FLEXERIL) 10 MG tablet Take 1 tablet by mouth 3 times daily as needed for Muscle spasms 60 tablet 0    fluticasone (FLONASE) 50 MCG/ACT nasal spray 2 sprays by Nasal route daily 1 Bottle 11    gabapentin (NEURONTIN) 300 MG capsule Take 1 capsule by mouth 3 times daily. . 90 capsule 2    Naproxen Sodium (ALEVE) 220 MG CAPS Take 440 mg by mouth daily as needed for Pain 60 capsule     metFORMIN (GLUCOPHAGE XR) 500 MG extended release tablet Take 1 tablet by mouth 2 times daily (before meals) 60 tablet 5    albuterol sulfate HFA (PROAIR HFA) 108 (90 Base) MCG/ACT inhaler Inhale 2 puffs into the lungs every 6 hours as needed for Wheezing or Shortness of Breath 8.5 g 0     No current facility-administered medications for this visit. Vital Signs:  /80 (Site: Right Lower Arm, Position: Sitting, Cuff Size: Large Adult)   Pulse 77   Ht 5' 5.5\" (1.664 m)   Wt (!) 441 lb (200 kg)   BMI 72.27 kg/m²     BMI/Height/Weight:  Body mass index is 72.27 kg/m². Review of Systems - A review of systems was performed. All was negative unless otherwise documented in HPI. Constitutional: Negative for fever, chills and diaphoresis. HENT: Negative for hearing loss and trouble swallowing. Eyes: Negative for photophobia and visual disturbance. Respiratory: Negative for cough, shortness of breath and wheezing. Cardiovascular: Negative for chest pain and palpitations. Gastrointestinal: Negative for nausea, vomiting, abdominal pain, diarrhea, constipation, blood in stool and abdominal distention.    Endocrine: Negative for polydipsia, polyphagia and polyuria. Genitourinary: Negative for dysuria, frequency, hematuria and difficulty urinating. Musculoskeletal: Negative for myalgias, joint swelling. Skin: Negative for pallor and rash. Neurological: Negative for dizziness, tremors, light-headedness and headaches. Psychiatric/Behavioral: Negative for sleep disturbance and dysphoric mood. Objective:      Physical Exam   Vital signs reviewed. General: Well-developed and well-nourished. No acute distress. Skin: Warm, dry and intact. HEENT: Normocephalic. EOMs intact. Conjunctivae normal. Neck supple. Cardiovascular: Normal rate, regular rhythm. Pulmonary/Chest: Normal effort. Lungs clear to auscultation. No rales, rhonchi or wheezing. Abdominal: Positive bowel sounds. Soft, nontender. Nondistended. Musculoskeletal: Movement x4. No edema. Neurological: Gait normal. Alert and oriented to person, place, and time. Psychiatric: Normal mood and affect. Speech and behavior normal. Judgment and thought content normal. Cognition and memory intact. Assessment:       Diagnosis Orders   1. Essential hypertension     2. Pure hypercholesterolemia     3. Uncomplicated asthma, unspecified asthma severity, unspecified whether persistent     4. Indigestion     5. Obstructive sleep apnea syndrome     6. Prediabetes     7. Morbid obesity with BMI of 70 and over, adult [facility])         Plan:    Dietitian visit today. Patient was encouraged to journal all food intake. Keep calorie level at approximately [id number]. Protein intake is to be a minimum of 60-80 grams per day. Water drinking was encouraged with a goal of 64oz-128oz daily. Beverages to be calorie free except for milk. Every other beverage should be water. Avoid soda. Continue to increase level of physical activity. Encouraged use of exercise log. Have H Pylori breath test done as previously ordered. Follow-up  Return in about 1 month (around 6/2/2019).     Orders

## 2019-06-07 ENCOUNTER — OFFICE VISIT (OUTPATIENT)
Dept: BARIATRICS/WEIGHT MGMT | Age: 49
End: 2019-06-07
Payer: COMMERCIAL

## 2019-06-07 VITALS
DIASTOLIC BLOOD PRESSURE: 84 MMHG | SYSTOLIC BLOOD PRESSURE: 128 MMHG | RESPIRATION RATE: 20 BRPM | WEIGHT: 315 LBS | HEIGHT: 66 IN | BODY MASS INDEX: 50.62 KG/M2 | HEART RATE: 68 BPM

## 2019-06-07 DIAGNOSIS — G47.30 SLEEP APNEA, UNSPECIFIED TYPE: ICD-10-CM

## 2019-06-07 DIAGNOSIS — R73.03 PREDIABETES: ICD-10-CM

## 2019-06-07 DIAGNOSIS — E66.01 MORBID OBESITY WITH BMI OF 70 AND OVER, ADULT (HCC): ICD-10-CM

## 2019-06-07 DIAGNOSIS — K30 INDIGESTION: ICD-10-CM

## 2019-06-07 DIAGNOSIS — I10 ESSENTIAL HYPERTENSION: Primary | ICD-10-CM

## 2019-06-07 DIAGNOSIS — J45.909 ASTHMA, UNSPECIFIED ASTHMA SEVERITY, UNSPECIFIED WHETHER COMPLICATED, UNSPECIFIED WHETHER PERSISTENT: ICD-10-CM

## 2019-06-07 PROCEDURE — 99213 OFFICE O/P EST LOW 20 MIN: CPT | Performed by: NURSE PRACTITIONER

## 2019-06-07 PROCEDURE — G8427 DOCREV CUR MEDS BY ELIG CLIN: HCPCS | Performed by: NURSE PRACTITIONER

## 2019-06-07 PROCEDURE — G8417 CALC BMI ABV UP PARAM F/U: HCPCS | Performed by: NURSE PRACTITIONER

## 2019-06-07 PROCEDURE — 1036F TOBACCO NON-USER: CPT | Performed by: NURSE PRACTITIONER

## 2019-06-07 NOTE — PROGRESS NOTES
Medical Weight Management Progress Note    Subjective     Patient being seen for medically supervised weight loss for the chronic conditions of HTN, TASHA, HLD, Prediabetes. He is working on the behavior changes discussed at the initial appointment. Patient continues on diet plan. Physical activity includes walking . Weight gain of 2 lbs since last visit. Using CPAP. Psych eval report received and clearance received. EGD completed and H Pylori positive. Finishing treatment regimen. Needs repeat H Pylori breath test.  Has order. No current issues. Working toward bariatric surgery:    [x] Sleeve Gastrectomy                                                           [] Pipo-en-Y Gastric Bypass    Allergies: Allergies   Allergen Reactions    Pcn [Penicillins] Anaphylaxis    Lidocaine      Pt unsure of reaction,  Happened as a child       Past Medical History:     Past Medical History:   Diagnosis Date    Abnormal stress test 03/2019    Allergic rhinitis     Asthma     Moderate persistent asthma, unspecified whether complicated    Hypertension     Obesity     Positive H. pylori test     Post-nasal drip     Sprain, lumbar     Unspecified sleep apnea     TASHA (obstructive sleep apnea) CPAP NIGHTLY   .     Past Surgical History:  Past Surgical History:   Procedure Laterality Date    CARDIAC CATHETERIZATION  04/17/2019    HEEL SPUR SURGERY      UPPER GASTROINTESTINAL ENDOSCOPY N/A 3/8/2019    EGD BIOPSY performed by Sheryl Titus DO at [facility]       Family History:  Family History   Problem Relation Age of Onset    Cancer Maternal Grandfather         lung    Broken Bones Paternal Grandmother         hip    Glaucoma Paternal Grandfather     Diabetes Mother     High Cholesterol Mother     Mental Illness Mother     Heart Disease Mother     Other Mother         GERD, TASHA    High Blood Pressure Mother     Depression Mother     High Cholesterol Father     Diabetes Father    [facility] High Blood Pressure Father     Asthma Father     Other Father         TASHA, BPH, gout    Cancer Sister 48        unsure of name, lived 2 weeks after diagnosis       Social History:  Social History     Socioeconomic History    Marital status: Legally      Spouse name: Not on file    Number of children: Not on file    Years of education: Not on file    Highest education level: Not on file   Occupational History    Not on file   Social Needs    Financial resource strain: Not on file    Food insecurity:     Worry: Not on file     Inability: Not on file    Transportation needs:     Medical: Not on file     Non-medical: Not on file   Tobacco Use    Smoking status: Never Smoker    Smokeless tobacco: Never Used   Substance and Sexual Activity    Alcohol use:  Yes     Alcohol/week: 0.0 oz     Comment: rarely    Drug use: No    Sexual activity: Yes     Partners: Female     Comment: girlfriend had tubal   Lifestyle    Physical activity:     Days per week: Not on file     Minutes per session: Not on file    Stress: Not on file   Relationships    Social connections:     Talks on phone: Not on file     Gets together: Not on file     Attends Yarsani service: Not on file     Active member of club or organization: Not on file     Attends meetings of clubs or organizations: Not on file     Relationship status: Not on file    Intimate partner violence:     Fear of current or ex partner: Not on file     Emotionally abused: Not on file     Physically abused: Not on file     Forced sexual activity: Not on file   Other Topics Concern    Not on file   Social History Narrative    Not on file       Current Medications:  Current Outpatient Medications   Medication Sig Dispense Refill    amLODIPine (NORVASC) 5 MG tablet take 1 tablet by mouth once daily  0    metroNIDAZOLE (FLAGYL) 500 MG tablet take 1/2 tablet by mouth four times a day for 10 days  0    tetracycline (ACHROMYCIN;SUMYCIN) 500 MG capsule Take 1 capsule by mouth 3 times daily 30 capsule 0    omeprazole (PRILOSEC) 20 MG delayed release capsule Take 1 capsule by mouth daily 30 capsule 3    Fluticasone Furoate-Vilanterol (BREO ELLIPTA) 200-25 MCG/INH AEPB Inhale 1 puff into the lungs daily 3 each 5    traZODone (DESYREL) 100 MG tablet Take 1 tablet by mouth nightly 30 tablet 5    cyclobenzaprine (FLEXERIL) 10 MG tablet Take 1 tablet by mouth 3 times daily as needed for Muscle spasms 60 tablet 0    fluticasone (FLONASE) 50 MCG/ACT nasal spray 2 sprays by Nasal route daily 1 Bottle 11    gabapentin (NEURONTIN) 300 MG capsule Take 1 capsule by mouth 3 times daily. . 90 capsule 2    Naproxen Sodium (ALEVE) 220 MG CAPS Take 440 mg by mouth daily as needed for Pain 60 capsule     metFORMIN (GLUCOPHAGE XR) 500 MG extended release tablet Take 1 tablet by mouth 2 times daily (before meals) 60 tablet 5    albuterol sulfate HFA (PROAIR HFA) 108 (90 Base) MCG/ACT inhaler Inhale 2 puffs into the lungs every 6 hours as needed for Wheezing or Shortness of Breath 8.5 g 0     No current facility-administered medications for this visit. Vital Signs:  /84 (Site: Right Upper Arm, Position: Sitting, Cuff Size: Large Adult)   Pulse 68   Resp 20   Ht 5' 5.5\" (1.664 m)   Wt (!) 443 lb (200.9 kg)   BMI 72.60 kg/m²     BMI/Height/Weight:  Body mass index is 72.6 kg/m². Review of Systems - A review of systems was performed. All was negative unless otherwise documented in HPI. Constitutional: Negative for fever, chills and diaphoresis. HENT: Negative for hearing loss and trouble swallowing. Eyes: Negative for photophobia and visual disturbance. Respiratory: Negative for cough, shortness of breath and wheezing. Cardiovascular: Negative for chest pain and palpitations. Gastrointestinal: Negative for nausea, vomiting, abdominal pain, diarrhea, constipation, blood in stool and abdominal distention.    Endocrine: Negative for polydipsia, polyphagia and polyuria. Genitourinary: Negative for dysuria, frequency, hematuria and difficulty urinating. Musculoskeletal: Negative for myalgias, joint swelling. Skin: Negative for pallor and rash. Neurological: Negative for dizziness, tremors, light-headedness and headaches. Psychiatric/Behavioral: Negative for sleep disturbance and dysphoric mood. Objective:      Physical Exam   Vital signs reviewed. General: Well-developed and well-nourished. No acute distress. Skin: Warm, dry and intact. HEENT: Normocephalic. EOMs intact. Conjunctivae normal. Neck supple. Cardiovascular: Normal rate, regular rhythm. Pulmonary/Chest: Normal effort. Lungs clear to auscultation. No rales, rhonchi or wheezing. Abdominal: Positive bowel sounds. Soft, nontender. Nondistended. Musculoskeletal: Movement x4. No edema. Neurological: Gait normal. Alert and oriented to person, place, and time. Psychiatric: Normal mood and affect. Speech and behavior normal. Judgment and thought content normal. Cognition and memory intact. Assessment:       Diagnosis Orders   1. Essential hypertension     2. Prediabetes     3. Sleep apnea, unspecified type     4. Asthma, unspecified asthma severity, unspecified whether complicated, unspecified whether persistent     5. Indigestion     6. Morbid obesity with BMI of 70 and over, adult [facility])         Plan:    Dietitian visit today. Patient was encouraged to journal all food intake. Keep calorie level at approximately 9987-3767. Protein intake is to be a minimum of 60-80 grams per day. Water drinking was encouraged with a goal of 64oz-128oz daily. Beverages to be calorie free except for milk. Every other beverage should be water. Avoid soda. Continue to increase level of physical activity. Encouraged use of exercise log. Have H Pylori breath test done 2 weeks after completion of PrevPac therapy. Follow-up  Return in about 1 month (around 7/7/2019).     Orders this encounter:  No

## 2019-07-08 ENCOUNTER — HOSPITAL ENCOUNTER (OUTPATIENT)
Age: 49
Discharge: HOME | End: 2019-07-08
Payer: COMMERCIAL

## 2019-07-08 ENCOUNTER — OFFICE VISIT (OUTPATIENT)
Dept: BARIATRICS/WEIGHT MGMT | Age: 49
End: 2019-07-08
Payer: COMMERCIAL

## 2019-07-08 VITALS
SYSTOLIC BLOOD PRESSURE: 134 MMHG | HEIGHT: 66 IN | DIASTOLIC BLOOD PRESSURE: 84 MMHG | WEIGHT: 315 LBS | RESPIRATION RATE: 20 BRPM | BODY MASS INDEX: 50.62 KG/M2 | HEART RATE: 72 BPM

## 2019-07-08 DIAGNOSIS — A04.8 POSITIVE H. PYLORI TEST: ICD-10-CM

## 2019-07-08 DIAGNOSIS — G47.33 OBSTRUCTIVE SLEEP APNEA SYNDROME: ICD-10-CM

## 2019-07-08 DIAGNOSIS — E66.01 MORBID OBESITY WITH BMI OF 70 AND OVER, ADULT (HCC): ICD-10-CM

## 2019-07-08 DIAGNOSIS — K30 INDIGESTION: ICD-10-CM

## 2019-07-08 DIAGNOSIS — E78.00 PURE HYPERCHOLESTEROLEMIA: ICD-10-CM

## 2019-07-08 DIAGNOSIS — I10 ESSENTIAL HYPERTENSION: Primary | ICD-10-CM

## 2019-07-08 DIAGNOSIS — J45.909 UNCOMPLICATED ASTHMA, UNSPECIFIED ASTHMA SEVERITY, UNSPECIFIED WHETHER PERSISTENT: ICD-10-CM

## 2019-07-08 DIAGNOSIS — R73.03 PREDIABETES: ICD-10-CM

## 2019-07-08 PROCEDURE — G8417 CALC BMI ABV UP PARAM F/U: HCPCS | Performed by: NURSE PRACTITIONER

## 2019-07-08 PROCEDURE — 83013 H PYLORI (C-13) BREATH: CPT

## 2019-07-08 PROCEDURE — 83014 H PYLORI DRUG ADMIN: CPT

## 2019-07-08 PROCEDURE — G8427 DOCREV CUR MEDS BY ELIG CLIN: HCPCS | Performed by: NURSE PRACTITIONER

## 2019-07-08 PROCEDURE — 99213 OFFICE O/P EST LOW 20 MIN: CPT | Performed by: NURSE PRACTITIONER

## 2019-07-08 PROCEDURE — 1036F TOBACCO NON-USER: CPT | Performed by: NURSE PRACTITIONER

## 2019-07-10 ENCOUNTER — OFFICE VISIT (OUTPATIENT)
Dept: FAMILY MEDICINE CLINIC | Age: 49
End: 2019-07-10
Payer: COMMERCIAL

## 2019-07-10 ENCOUNTER — TELEPHONE (OUTPATIENT)
Dept: BARIATRICS/WEIGHT MGMT | Age: 49
End: 2019-07-10

## 2019-07-10 VITALS
OXYGEN SATURATION: 99 % | TEMPERATURE: 97.6 F | DIASTOLIC BLOOD PRESSURE: 84 MMHG | SYSTOLIC BLOOD PRESSURE: 133 MMHG | WEIGHT: 315 LBS | BODY MASS INDEX: 72.93 KG/M2 | RESPIRATION RATE: 16 BRPM | HEART RATE: 60 BPM

## 2019-07-10 DIAGNOSIS — L91.8 SKIN TAG: ICD-10-CM

## 2019-07-10 DIAGNOSIS — R73.03 PREDIABETES: ICD-10-CM

## 2019-07-10 DIAGNOSIS — E78.00 PURE HYPERCHOLESTEROLEMIA: ICD-10-CM

## 2019-07-10 DIAGNOSIS — I10 ESSENTIAL HYPERTENSION: Primary | ICD-10-CM

## 2019-07-10 DIAGNOSIS — A04.8 POSITIVE H. PYLORI TEST: Primary | ICD-10-CM

## 2019-07-10 LAB — H PYLORI BREATH TEST: POSITIVE

## 2019-07-10 PROCEDURE — 1036F TOBACCO NON-USER: CPT | Performed by: NURSE PRACTITIONER

## 2019-07-10 PROCEDURE — G8427 DOCREV CUR MEDS BY ELIG CLIN: HCPCS | Performed by: NURSE PRACTITIONER

## 2019-07-10 PROCEDURE — G8417 CALC BMI ABV UP PARAM F/U: HCPCS | Performed by: NURSE PRACTITIONER

## 2019-07-10 PROCEDURE — 99214 OFFICE O/P EST MOD 30 MIN: CPT | Performed by: NURSE PRACTITIONER

## 2019-07-10 RX ORDER — CLARITHROMYCIN 500 MG/1
500 TABLET, COATED ORAL 2 TIMES DAILY
Qty: 28 TABLET | Refills: 0 | Status: SHIPPED | OUTPATIENT
Start: 2019-07-10 | End: 2019-07-24

## 2019-07-10 RX ORDER — BISMUTH SUBSALICYLATE 262 MG/1
524 TABLET, CHEWABLE ORAL 4 TIMES DAILY
Qty: 112 TABLET | Refills: 0 | Status: SHIPPED | OUTPATIENT
Start: 2019-07-10 | End: 2019-07-24

## 2019-07-10 RX ORDER — METRONIDAZOLE 500 MG/1
500 TABLET ORAL 2 TIMES DAILY
Qty: 28 TABLET | Refills: 0 | Status: SHIPPED | OUTPATIENT
Start: 2019-07-10 | End: 2019-07-24

## 2019-07-10 RX ORDER — LANSOPRAZOLE 30 MG/1
30 CAPSULE, DELAYED RELEASE ORAL 2 TIMES DAILY
Qty: 28 CAPSULE | Refills: 0 | Status: SHIPPED | OUTPATIENT
Start: 2019-07-10 | End: 2019-09-03 | Stop reason: ALTCHOICE

## 2019-07-10 ASSESSMENT — ENCOUNTER SYMPTOMS
EYES NEGATIVE: 1
DIARRHEA: 0
CONSTIPATION: 0
ABDOMINAL PAIN: 0
SINUS PRESSURE: 0
BACK PAIN: 0
VOMITING: 0
NAUSEA: 0
COUGH: 0
WHEEZING: 0
SORE THROAT: 0
CHEST TIGHTNESS: 0
SHORTNESS OF BREATH: 0

## 2019-07-10 ASSESSMENT — PATIENT HEALTH QUESTIONNAIRE - PHQ9
SUM OF ALL RESPONSES TO PHQ QUESTIONS 1-9: 2
1. LITTLE INTEREST OR PLEASURE IN DOING THINGS: 1
2. FEELING DOWN, DEPRESSED OR HOPELESS: 1
SUM OF ALL RESPONSES TO PHQ9 QUESTIONS 1 & 2: 2
SUM OF ALL RESPONSES TO PHQ QUESTIONS 1-9: 2

## 2019-07-10 NOTE — PATIENT INSTRUCTIONS
A serving is 1 slice of bread, 1 ounce of dry cereal, or ½ cup of cooked rice, pasta, or cooked cereal. Try to choose whole-grain products as much as possible. · Limit lean meat, poultry, and fish to 2 servings each day. A serving is 3 ounces, about the size of a deck of cards. · Eat 4 to 5 servings of nuts, seeds, and legumes (cooked dried beans, lentils, and split peas) each week. A serving is 1/3 cup of nuts, 2 tablespoons of seeds, or ½ cup of cooked beans or peas. · Limit fats and oils to 2 to 3 servings each day. A serving is 1 teaspoon of vegetable oil or 2 tablespoons of salad dressing. · Limit sweets and added sugars to 5 servings or less a week. A serving is 1 tablespoon jelly or jam, ½ cup sorbet, or 1 cup of lemonade. · Eat less than 2,300 milligrams (mg) of sodium a day. If you limit your sodium to 1,500 mg a day, you can lower your blood pressure even more. Tips for success  · Start small. Do not try to make dramatic changes to your diet all at once. You might feel that you are missing out on your favorite foods and then be more likely to not follow the plan. Make small changes, and stick with them. Once those changes become habit, add a few more changes. · Try some of the following:  ? Make it a goal to eat a fruit or vegetable at every meal and at snacks. This will make it easy to get the recommended amount of fruits and vegetables each day. ? Try yogurt topped with fruit and nuts for a snack or healthy dessert. ? Add lettuce, tomato, cucumber, and onion to sandwiches. ? Combine a ready-made pizza crust with low-fat mozzarella cheese and lots of vegetable toppings. Try using tomatoes, squash, spinach, broccoli, carrots, cauliflower, and onions. ? Have a variety of cut-up vegetables with a low-fat dip as an appetizer instead of chips and dip. ? Sprinkle sunflower seeds or chopped almonds over salads. Or try adding chopped walnuts or almonds to cooked vegetables.   ? Try some vegetarian programs and medicines. These can increase your chances of quitting for good. · If your doctor prescribed medicines, take them exactly as prescribed. Call your doctor if you think you are having a problem with your medicine. You will get more details on the specific medicines your doctor prescribes. When should you call for help? Watch closely for changes in your health, and be sure to contact your doctor if:    · You have any symptoms of diabetes. These may include:  ? Being thirsty more often. ? Urinating more. ? Being hungrier. ? Losing weight. ? Being very tired. ? Having blurry vision.     · You have a wound that will not heal.     · You have an infection that will not go away.     · You have problems with your blood pressure.     · You want more information about diabetes and how you can keep from getting it. Where can you learn more? Go to [email or web address] and sign in to your Remember The Member account. Enter I222 in the Revuze box to learn more about \"Prediabetes: Care Instructions. \"     If you do not have an account, please click on the \"Sign Up Now\" link. Current as of: July 25, 2018  Content Version: 12.0  © 3812-3194 [employer], [employer]orporated. Care instructions adapted under license by [facility] ([facility]). If you have questions about a medical condition or this instruction, always ask your healthcare professional. [address] any warranty or liability for your use of this information.

## 2019-07-10 NOTE — PROGRESS NOTES
Patient voiced understanding. Quality Measures    Body mass index is 72.93 kg/m². Elevated. Weight control planned discussed Healthy diet and regular exercise. BP: 133/84 Blood pressure is normal. Treatment plan consists of No treatment change needed.     Lab Results   Component Value Date    LDLCALC 120 06/05/2015    LDLCHOLESTEROL 118 10/26/2018    (goal LDL reduction with dx if diabetes is 50% LDL reduction)      PHQ Scores 7/10/2019 7/23/2018 4/24/2017   PHQ2 Score 2 0 0   PHQ9 Score 2 0 0     Interpretation of Total Score Depression Severity: 1-4 = Minimal depression, 5-9 = Mild depression, 10-14 = Moderate depression, 15-19 = Moderately severe depression, 20-27 = Severe depression      The 10-year ASCVD risk score (Nelda Si., et al., 2013) is: 4.6%    Values used to calculate the score:      Age: 52 years      Sex: Male      Is Non- : No      Diabetic: No      Tobacco smoker: No      Systolic Blood Pressure: 672 mmHg      Is BP treated: Yes      HDL Cholesterol: 38 mg/dL      Total Cholesterol: 182 mg/dL          Electronically signed by LAMONT Lorenzo CNP on 7/10/2019 at 8:40 AM

## 2019-07-15 ENCOUNTER — TELEPHONE (OUTPATIENT)
Dept: FAMILY MEDICINE CLINIC | Age: 49
End: 2019-07-15

## 2019-07-15 NOTE — TELEPHONE ENCOUNTER
Patient called in and states that he would like to have his BP med switched. States that since taking the amlodipine, he has had headaches, dry mouth and a bad aftertaste in his mouth that is unbearable. He is asking if it can be switched. States he will not be taking the current med because it is making him feel so bad.          Next Visit Date:  Future Appointments   Date Time Provider Betty Garcia   7/31/2019  2:00 PM Chelsi Foss MD [id number]   8/19/2019  4:00 PM SCHEDULE, RESP SPEC [place] Resp [facility] Maintenance   Topic Date Due    DTaP/Tdap/Td vaccine (1 - Tdap) 01/16/2020 (Originally [birth year]/1989)    Flu vaccine (1) 01/17/2020 (Originally 9/1/2019)    Pneumococcal 0-64 years Vaccine (1 of 1 - PPSV23) 01/24/2020 (Originally 2/26/1976)    A1C test (Diabetic or Prediabetic)  10/26/2019    Potassium monitoring  04/17/2020    Creatinine monitoring  04/17/2020    Lipid screen  10/26/2023    HIV screen  Completed       Hemoglobin A1C (%)   Date Value   10/26/2018 6.0   07/21/2018 5.9   04/09/2018 5.8             ( goal A1C is < 7)   No results found for: LABMICR  LDL Cholesterol (mg/dL)   Date Value   10/26/2018 118   04/09/2018 122     LDL Calculated (mg/dL)   Date Value   06/05/2015 120       (goal LDL is <100)   AST (U/L)   Date Value   10/26/2018 21     ALT (U/L)   Date Value   10/26/2018 20     BUN (mg/dL)   Date Value   10/26/2018 8     BP Readings from Last 3 Encounters:   07/10/19 133/84   07/08/19 134/84   06/07/19 128/84          (goal 120/80)    All Future Testing planned in [facility]              Patient Active Problem List:     Vitamin D deficiency     Left shoulder pain     Scalp psoriasis     Prediabetes     Essential hypertension     Sleep apnea     Morbid obesity with BMI of 70 and over, adult ([facility] [address]     Pure hypercholesterolemia     Asthma     Indigestion     Acute superficial gastritis without hemorrhage     Positive H. pylori test

## 2019-07-31 ENCOUNTER — HOSPITAL ENCOUNTER (OUTPATIENT)
Age: 49
Setting detail: SPECIMEN
Discharge: HOME | End: 2019-07-31
Payer: COMMERCIAL

## 2019-07-31 ENCOUNTER — OFFICE VISIT (OUTPATIENT)
Dept: DERMATOLOGY | Age: 49
End: 2019-07-31
Payer: COMMERCIAL

## 2019-07-31 VITALS
WEIGHT: 315 LBS | SYSTOLIC BLOOD PRESSURE: 152 MMHG | HEIGHT: 66 IN | DIASTOLIC BLOOD PRESSURE: 84 MMHG | BODY MASS INDEX: 50.62 KG/M2

## 2019-07-31 DIAGNOSIS — D48.5 NEOPLASM OF UNCERTAIN BEHAVIOR OF SKIN: Primary | ICD-10-CM

## 2019-07-31 PROCEDURE — 11102 TANGNTL BX SKIN SINGLE LES: CPT | Performed by: DERMATOLOGY

## 2019-07-31 NOTE — PROGRESS NOTES
require sutures and their removal.    How to Care for Biopsy Wound    A.  Leave band-aid or dressing on for 24 hours. B. Wash two times a day with soap and water. C.  Let the wound air dry, then apply Vaseline ointment and cover with a Band-Aid       unless otherwise instructed by your provider. D. If there is slight discomfort, you may give acetaminophen or ibuprofen. When To Call the Doctor    Call the Dermatology Clinic or your doctor if any of the following occur:    A. Redness and swelling  B. Tenderness and warm to touch  C.  Drainage from wound  D. Fever    Biopsy Results    Biopsy results are usually available in 1-2 weeks. We provide biopsy results in letters for begin results or we will call for any concerning results. If you have not heard from our staff please call the office within 2 weeks. Please call our office with any concerns at [phone]. Follow-up: No follow-ups on file. This note was created with the assistance of a speech-recognition program.  Although the intention is to generate a document that actually reflects the content of the visit, no guarantees can be provided that every mistake has been identified and corrected byediting.     Electronically signed by Thalia Groves MD on 7/31/19 at 2:03 PM

## 2019-08-02 LAB — DERMATOLOGY PATHOLOGY REPORT: NORMAL

## 2019-08-09 ENCOUNTER — HOSPITAL ENCOUNTER (OUTPATIENT)
Age: 49
Discharge: HOME | End: 2019-08-09
Payer: COMMERCIAL

## 2019-08-09 ENCOUNTER — TELEPHONE (OUTPATIENT)
Dept: BARIATRICS/WEIGHT MGMT | Age: 49
End: 2019-08-09

## 2019-08-09 DIAGNOSIS — A04.8 POSITIVE H. PYLORI TEST: ICD-10-CM

## 2019-08-09 DIAGNOSIS — A04.8 POSITIVE H. PYLORI TEST: Primary | ICD-10-CM

## 2019-08-09 PROCEDURE — 83013 H PYLORI (C-13) BREATH: CPT

## 2019-08-09 PROCEDURE — 83014 H PYLORI DRUG ADMIN: CPT

## 2019-08-11 LAB — H PYLORI BREATH TEST: POSITIVE

## 2019-08-13 DIAGNOSIS — A04.8 POSITIVE H. PYLORI TEST: Primary | ICD-10-CM

## 2019-08-28 ENCOUNTER — TELEPHONE (OUTPATIENT)
Dept: BARIATRICS/WEIGHT MGMT | Age: 49
End: 2019-08-28

## 2019-08-29 ENCOUNTER — TELEPHONE (OUTPATIENT)
Dept: BARIATRICS/WEIGHT MGMT | Age: 49
End: 2019-08-29

## 2019-09-03 ENCOUNTER — HOSPITAL ENCOUNTER (OUTPATIENT)
Dept: PREADMISSION TESTING | Age: 49
Discharge: HOME | End: 2019-09-07
Payer: COMMERCIAL

## 2019-09-03 ENCOUNTER — HOSPITAL ENCOUNTER (OUTPATIENT)
Dept: GENERAL RADIOLOGY | Age: 49
Discharge: HOME | End: 2019-09-05
Payer: COMMERCIAL

## 2019-09-03 ENCOUNTER — NURSE ONLY (OUTPATIENT)
Dept: BARIATRICS/WEIGHT MGMT | Age: 49
End: 2019-09-03

## 2019-09-03 VITALS
BODY MASS INDEX: 50.62 KG/M2 | TEMPERATURE: 99 F | RESPIRATION RATE: 18 BRPM | SYSTOLIC BLOOD PRESSURE: 152 MMHG | WEIGHT: 315 LBS | HEIGHT: 66 IN | DIASTOLIC BLOOD PRESSURE: 98 MMHG | OXYGEN SATURATION: 96 % | HEART RATE: 87 BPM

## 2019-09-03 LAB
ANION GAP SERPL CALCULATED.3IONS-SCNC: 12 MMOL/L (ref 9–17)
BUN BLDV-MCNC: 7 MG/DL (ref 6–20)
BUN/CREAT BLD: ABNORMAL (ref 9–20)
CALCIUM SERPL-MCNC: 9.1 MG/DL (ref 8.6–10.4)
CHLORIDE BLD-SCNC: 100 MMOL/L (ref 98–107)
CO2: 27 MMOL/L (ref 20–31)
CREAT SERPL-MCNC: 0.98 MG/DL (ref 0.7–1.2)
GFR AFRICAN AMERICAN: >60 ML/MIN
GFR NON-AFRICAN AMERICAN: >60 ML/MIN
GFR SERPL CREATININE-BSD FRML MDRD: ABNORMAL ML/MIN/{1.73_M2}
GFR SERPL CREATININE-BSD FRML MDRD: ABNORMAL ML/MIN/{1.73_M2}
GLUCOSE BLD-MCNC: 112 MG/DL (ref 70–99)
HCT VFR BLD CALC: 51.3 % (ref 40.7–50.3)
HEMOGLOBIN: 16 G/DL (ref 13–17)
INR BLD: 1
MCH RBC QN AUTO: 27.4 PG (ref 25.2–33.5)
MCHC RBC AUTO-ENTMCNC: 31.2 G/DL (ref 28.4–34.8)
MCV RBC AUTO: 87.8 FL (ref 82.6–102.9)
NRBC AUTOMATED: 0 PER 100 WBC
PARTIAL THROMBOPLASTIN TIME: 24.6 SEC (ref 20.5–30.5)
PDW BLD-RTO: 12.9 % (ref 11.8–14.4)
PLATELET # BLD: 267 K/UL (ref 138–453)
PMV BLD AUTO: 10.6 FL (ref 8.1–13.5)
POTASSIUM SERPL-SCNC: 4.6 MMOL/L (ref 3.7–5.3)
PROTHROMBIN TIME: 10.4 SEC (ref 9–12)
RBC # BLD: 5.84 M/UL (ref 4.21–5.77)
SODIUM BLD-SCNC: 139 MMOL/L (ref 135–144)
WBC # BLD: 9.5 K/UL (ref 3.5–11.3)

## 2019-09-03 PROCEDURE — 85027 COMPLETE CBC AUTOMATED: CPT

## 2019-09-03 PROCEDURE — G0480 DRUG TEST DEF 1-7 CLASSES: HCPCS

## 2019-09-03 PROCEDURE — 93005 ELECTROCARDIOGRAM TRACING: CPT | Performed by: SURGERY

## 2019-09-03 PROCEDURE — 85730 THROMBOPLASTIN TIME PARTIAL: CPT

## 2019-09-03 PROCEDURE — 85610 PROTHROMBIN TIME: CPT

## 2019-09-03 PROCEDURE — 80048 BASIC METABOLIC PNL TOTAL CA: CPT

## 2019-09-03 PROCEDURE — 71046 X-RAY EXAM CHEST 2 VIEWS: CPT

## 2019-09-03 PROCEDURE — 36415 COLL VENOUS BLD VENIPUNCTURE: CPT

## 2019-09-03 RX ORDER — SODIUM CHLORIDE, SODIUM LACTATE, POTASSIUM CHLORIDE, CALCIUM CHLORIDE 600; 310; 30; 20 MG/100ML; MG/100ML; MG/100ML; MG/100ML
1000 INJECTION, SOLUTION INTRAVENOUS CONTINUOUS
Status: CANCELLED | OUTPATIENT
Start: 2019-09-03

## 2019-09-03 RX ORDER — OXYCODONE HYDROCHLORIDE AND ACETAMINOPHEN 5; 325 MG/1; MG/1
1 TABLET ORAL
Refills: 0 | Status: ON HOLD | COMMUNITY
Start: 2019-08-25 | End: 2019-10-23 | Stop reason: HOSPADM

## 2019-09-03 NOTE — PROGRESS NOTES
Anesthesia Focused Assessment    STOP-BANG Sleep Apnea Questionnaire    SNORE loudly (heard through closed doors)? Yes  TIRED, fatigued, sleepy during daytime? Yes  OBSERVED stopping breathing during sleep? Yes  High blood PRESSURE being treated? No    BMI over 35? Yes  AGE over 48? No  NECK circumference over 16\"? Yes  GENDER (male)? Yes             Total 6  High risk 5-8  Intermediate risk 3-4  Low risk 0-2    Obstructive Sleep Apnea: yes  If YES, machine used: cpap     Type 1 DM:   no  T2DM:  Yes but does not take meds regularly. Coronary Artery Disease:  Minimal on cath, no stents. Hypertension:  no    Active smoker:  no  Drinks Alcohol:  no    Dentition: molar crowns    Defib / AICD / Pacemaker: no      Renal Failure/dialysis:  no    Patient was evaluated in PAT & anesthesia guidelines were applied. NPO guidelines, medication instructions and scheduled arrival time were reviewed with patient. Hx of anesthesia complications:  no  Family hx of anesthesia complications:  no                                                                                                                     Anesthesia contacted:   no  Medical or cardiac clearance ordered: clearance in chart, cardiac. PCP clearance pending.     Mela Diaz PA-C  9/3/19  1:31 PM

## 2019-09-03 NOTE — PROGRESS NOTES
Pt attended Bariatric Surgery pre-op class. Pt verbalized understanding and signed Surgical Patient Agreement. pt was on diet previous to coming to class__ week pre-op diet reviewed. Vitamin, calcium and protein shake use reviewed. Post- op diet Phases 1 to 4 covered. Pt allowed the opportunity to ask questions.       Instructions on ERAS protocol included

## 2019-09-03 NOTE — H&P
History and Physical    Pt Name: Jaun Howard  MRN: [id number]  YOB: 1970  Date of evaluation: 9/3/2019    SUBJECTIVE:   History of Chief Complaint:    Patient complains of obesity, difficulty with ability to lose weight and maintain that weight loss. He has multiple floresita problems that would be helped with weight loss. He has tried several programs in the past unsuccessfully. He has been following with bariatrics for almost one year now. His sister passed away recently, which the patient says was the \"turning point\" of him deciding to have surgery. He has been scheduled for sleeve gastrectomy. Past Medical History    has a past medical history of Abnormal stress test, Allergic rhinitis, Asthma, Borderline diabetes mellitus, History of cardiac cath, Hypertension, Obesity, TASHA on CPAP, Positive H. pylori test, Post-nasal drip, Sprain, lumbar, and Wears glasses. Past Surgical History   has a past surgical history that includes Heel spur surgery (Right, 2009); Upper gastrointestinal endoscopy (N/A, 3/8/2019); Cardiac catheterization (04/17/2019); and Rotator cuff repair (Left, 2010). Medications  Prior to Admission medications    Medication Sig Start Date End Date Taking? Authorizing Provider   Fluticasone Furoate-Vilanterol (BREO ELLIPTA) 200-25 MCG/INH AEPB Inhale 1 puff into the lungs daily 3/6/19 3/6/20 Yes LAMONT Prather CNP   traZODone (DESYREL) 100 MG tablet Take 1 tablet by mouth nightly  Patient taking differently: Take 100 mg by mouth nightly as needed  1/8/19 1/8/20 Yes LAMONT Cintron CNP   cyclobenzaprine (FLEXERIL) 10 MG tablet Take 1 tablet by mouth 3 times daily as needed for Muscle spasms 12/4/18 12/4/19 Yes LAMONT Prather CNP   gabapentin (NEURONTIN) 300 MG capsule Take 1 capsule by mouth 3 times daily. .  Patient taking differently: Take 300 mg by mouth 3 times daily as needed.   10/24/18 10/24/19 Yes LAMONT Prather CNP   metFORMIN (GLUCOPHAGE XR) 500 MG extended release tablet Take 1 tablet by mouth 2 times daily (before meals) 4/10/18 7/10/20 Yes LAMONT Palma CNP   albuterol sulfate HFA (PROAIR HFA) 108 (90 Base) MCG/ACT inhaler Inhale 2 puffs into the lungs every 6 hours as needed for Wheezing or Shortness of Breath 2/28/18 7/10/20 Yes LAMONT Palma CNP   oxyCODONE-acetaminophen (PERCOCET) 5-325 MG per tablet Take 1 tablet by mouth every 4-6 hours as needed. 8/25/19   Historical Provider, MD     Allergies  is allergic to pcn [penicillins] and lidocaine. Family History  family history includes Asthma in his father; Broken Bones in his paternal grandmother; Cancer (age of onset: 48) in his sister; Depression in his mother; Diabetes in his father and mother; Glaucoma in his paternal grandfather; Heart Disease in his mother; High Blood Pressure in his father and mother; High Cholesterol in his father and mother; Nick Mercury in his maternal grandfather; Mental Illness in his mother; Other in his mother; Sleep Apnea in his father and mother. Social History   reports that he has never smoked. He has never used smokeless tobacco.   reports that he drank alcohol. reports that he does not use drugs. Marital Status legally   Occupation [employer] employee    OBJECTIVE:   VITALS:  height is 5' 6\" (1.676 m) and weight is 437 lb (198.2 kg) (abnormal). His oral temperature is 99 °F (37.2 °C). His blood pressure is 152/98 (abnormal) and his pulse is 87. His respiration is 18 and oxygen saturation is 96%. CONSTITUTIONAL:alert & oriented x 3, no acute distress. Pleasant. Morbidly obese. SKIN:  Warm and dry, no rashes. HEAD:  Normocephalic, atraumatic   EYES: PERRL. EOMs intact. Wearing glasses. EARS:  Hearing grossly WNL. NOSE:  Nares patent. No rhinorrhea   MOUTH/THROAT:  benign  NECK:supple, no lymphadenopathy. Thick neck. LUNGS: Clear to auscultation bilaterally, no wheezes.   CARDIOVASCULAR: Heart sounds are normal.

## 2019-09-04 LAB
EKG ATRIAL RATE: 77 BPM
EKG P AXIS: 27 DEGREES
EKG P-R INTERVAL: 162 MS
EKG Q-T INTERVAL: 388 MS
EKG QRS DURATION: 98 MS
EKG QTC CALCULATION (BAZETT): 439 MS
EKG R AXIS: 49 DEGREES
EKG T AXIS: 32 DEGREES
EKG VENTRICULAR RATE: 77 BPM

## 2019-09-04 PROCEDURE — 93010 ELECTROCARDIOGRAM REPORT: CPT | Performed by: INTERNAL MEDICINE

## 2019-09-05 LAB
3-OH-COTININE: <2 NG/ML
COTININE: <2 NG/ML
NICOTINE: <2 NG/ML

## 2019-09-09 ENCOUNTER — TELEPHONE (OUTPATIENT)
Dept: BARIATRICS/WEIGHT MGMT | Age: 49
End: 2019-09-09

## 2019-09-09 ENCOUNTER — HOSPITAL ENCOUNTER (OUTPATIENT)
Age: 49
Discharge: HOME | End: 2019-09-09
Payer: COMMERCIAL

## 2019-09-09 ENCOUNTER — OFFICE VISIT (OUTPATIENT)
Dept: FAMILY MEDICINE CLINIC | Age: 49
End: 2019-09-09
Payer: COMMERCIAL

## 2019-09-09 VITALS
BODY MASS INDEX: 70.5 KG/M2 | HEART RATE: 76 BPM | WEIGHT: 315 LBS | RESPIRATION RATE: 14 BRPM | DIASTOLIC BLOOD PRESSURE: 72 MMHG | SYSTOLIC BLOOD PRESSURE: 126 MMHG

## 2019-09-09 DIAGNOSIS — A04.8 POSITIVE H. PYLORI TEST: ICD-10-CM

## 2019-09-09 DIAGNOSIS — E66.01 MORBID OBESITY WITH BMI OF 70 AND OVER, ADULT (HCC): ICD-10-CM

## 2019-09-09 DIAGNOSIS — R73.03 PREDIABETES: ICD-10-CM

## 2019-09-09 DIAGNOSIS — E78.00 PURE HYPERCHOLESTEROLEMIA: ICD-10-CM

## 2019-09-09 DIAGNOSIS — I10 ESSENTIAL HYPERTENSION: ICD-10-CM

## 2019-09-09 DIAGNOSIS — A04.8 POSITIVE H. PYLORI TEST: Primary | ICD-10-CM

## 2019-09-09 DIAGNOSIS — Z01.818 PRE-OPERATIVE CLEARANCE: Primary | ICD-10-CM

## 2019-09-09 PROCEDURE — G8427 DOCREV CUR MEDS BY ELIG CLIN: HCPCS | Performed by: NURSE PRACTITIONER

## 2019-09-09 PROCEDURE — G8417 CALC BMI ABV UP PARAM F/U: HCPCS | Performed by: NURSE PRACTITIONER

## 2019-09-09 PROCEDURE — 83013 H PYLORI (C-13) BREATH: CPT

## 2019-09-09 PROCEDURE — 1036F TOBACCO NON-USER: CPT | Performed by: NURSE PRACTITIONER

## 2019-09-09 PROCEDURE — 83014 H PYLORI DRUG ADMIN: CPT

## 2019-09-09 PROCEDURE — 99214 OFFICE O/P EST MOD 30 MIN: CPT | Performed by: NURSE PRACTITIONER

## 2019-09-09 ASSESSMENT — ENCOUNTER SYMPTOMS
CHEST TIGHTNESS: 0
EYES NEGATIVE: 1
CONSTIPATION: 0
VOMITING: 0
WHEEZING: 0
ABDOMINAL PAIN: 0
BACK PAIN: 0
SORE THROAT: 0
COUGH: 0
SHORTNESS OF BREATH: 0
NAUSEA: 0
SINUS PRESSURE: 0
DIARRHEA: 0

## 2019-09-09 NOTE — PROGRESS NOTES
Subjective:      Patient ID: Mary Guthrie is a 52 y.o. male. Visit Information    Have you changed or started any medications since your last visit including any over-the-counter medicines, vitamins, or herbal medicines? no   Are you having any side effects from any of your medications? -  no  Have you stopped taking any of your medications? Is so, why? -  no    Have you seen any other physician or provider since your last visit? No  Have you had any other diagnostic tests since your last visit? No  Have you been seen in the emergency room and/or had an admission to a hospital since we last saw you? No  Have you had your routine dental cleaning in the past 6 months? no    Have you activated your [employer] account? If not, what are your barriers? Yes     Patient Care Team:  LAMONT Morales CNP as PCP - General (Family Nurse Practitioner)  LAMONT Morales CNP as PCP - [facility] Provider    Medical History Review  Past Medical, Family, and Social History reviewed and does contribute to the patient presenting condition    Health Maintenance   Topic Date Due    DTaP/Tdap/Td vaccine (1 - Tdap) 01/16/2020 (Originally [birth year]26/1989)    Flu vaccine (1) 01/17/2020 (Originally 9/1/2019)    Pneumococcal 0-64 years Vaccine (1 of 1 - PPSV23) 01/24/2020 (Originally 2/26/1976)    A1C test (Diabetic or Prediabetic)  10/26/2019    Potassium monitoring  09/03/2020    Creatinine monitoring  09/03/2020    Lipid screen  10/26/2023    HIV screen  Completed       PHQ Scores 7/10/2019 7/23/2018 4/24/2017   PHQ2 Score 2 0 0   PHQ9 Score 2 0 0     Interpretation of Total Score DepressionSeverity: 1-4 = Minimal depression, 5-9 = Mild depression, 10-14 = Moderate depression, 15-19 = Moderately severe depression, 20-27 = Severe depression    Current Outpatient Medications   Medication Sig Dispense Refill    oxyCODONE-acetaminophen (PERCOCET) 5-325 MG per tablet Take 1 tablet by mouth every 4-6 hours as needed.   0 it does help   Skin: Negative for rash. Skin tag on back of left knee   Neurological: Negative for dizziness, syncope, numbness and headaches. Psychiatric/Behavioral: Positive for sleep disturbance (improved). Negative for agitation, dysphoric mood, self-injury and suicidal ideas. The patient is not nervous/anxious. Mood is ok - only using Trazodone as needed        Objective:     /72 (Site: Left Upper Arm, Position: Sitting, Cuff Size: Medium Adult)   Pulse 76   Resp 14   Wt (!) 436 lb 12.8 oz (198.1 kg)   BMI 70.50 kg/m²      Physical Exam   Constitutional: He is oriented to person, place, and time. He appears well-developed. No distress. Morbidly obese   HENT:   Head: Normocephalic and atraumatic. Right Ear: Tympanic membrane and external ear normal.   Left Ear: Tympanic membrane and external ear normal.   Nose: Mucosal edema and rhinorrhea present. Right sinus exhibits no maxillary sinus tenderness and no frontal sinus tenderness. Left sinus exhibits no maxillary sinus tenderness and no frontal sinus tenderness. Mouth/Throat: Uvula is midline and mucous membranes are normal. Posterior oropharyngeal erythema present. No oropharyngeal exudate or posterior oropharyngeal edema. Eyes: Conjunctivae are normal. Right eye exhibits no discharge. Left eye exhibits no discharge. Neck: Neck supple. Cardiovascular: Normal rate and regular rhythm. Exam reveals distant heart sounds (due to body habitus). Pulmonary/Chest: Effort normal. No respiratory distress. He has decreased breath sounds (diffuse). He has no wheezes. He has no rhonchi. He has no rales. Abdominal: Soft. He exhibits no distension. There is no tenderness. Protuberant    Lymphadenopathy:     He has no cervical adenopathy. Neurological: He is alert and oriented to person, place, and time. Skin: Skin is warm and dry. Psychiatric: His behavior is normal. He exhibits a depressed mood.    Mood is improved   Nursing note and vitals reviewed. Assessment:      Diagnosis Orders   1. Pre-operative clearance     2. Essential hypertension     3. Pure hypercholesterolemia     4. Prediabetes     5. Morbid obesity with BMI of 70 and over, adult [facility])         Lab Results   Component Value Date    WBC 9.5 09/03/2019    HGB 16.0 09/03/2019    HCT 51.3 (H) 09/03/2019    MCV 87.8 09/03/2019     09/03/2019       Lab Results   Component Value Date     09/03/2019    K 4.6 09/03/2019     09/03/2019    CO2 27 09/03/2019    BUN 7 09/03/2019    CREATININE 0.98 09/03/2019    GLUCOSE 112 (H) 09/03/2019    CALCIUM 9.1 09/03/2019    PROT 7.4 10/26/2018    LABALBU 4.1 10/26/2018    BILITOT 0.44 10/26/2018    ALKPHOS 63 10/26/2018    AST 21 10/26/2018    ALT 20 10/26/2018    LABGLOM >60 09/03/2019    GFRAA >60 09/03/2019       Lab Results   Component Value Date    CHOL 182 10/26/2018    CHOL 180 04/09/2018    CHOL 208 (H) 04/21/2017     Lab Results   Component Value Date    TRIG 129 10/26/2018    TRIG 97 04/09/2018    TRIG 95 04/21/2017     Lab Results   Component Value Date    HDL 38 (L) 10/26/2018    HDL 39 (L) 04/09/2018    HDL 50 04/21/2017     Lab Results   Component Value Date    LDLCHOLESTEROL 118 10/26/2018    LDLCHOLESTEROL 122 04/09/2018    LDLCHOLESTEROL 139 (H) 04/21/2017    LDLCALC 120 06/05/2015     Lab Results   Component Value Date    VLDL NOT REPORTED 10/26/2018    VLDL NOT REPORTED 04/09/2018    VLDL NOT REPORTED 04/21/2017     Lab Results   Component Value Date    CHOLHDLRATIO 4.8 10/26/2018    CHOLHDLRATIO 4.6 04/09/2018    CHOLHDLRATIO 4.2 04/21/2017       Lab Results   Component Value Date    LABA1C 6.0 10/26/2018     Lab Results   Component Value Date     10/26/2018       Plan:     Denies any troubles with anesthesia.  Denies any troubles in her family with anesthesia  Will proceed with clearance   Continue current medications  Call with any concerns   Return if symptoms worsen or fail to

## 2019-09-11 LAB — H PYLORI BREATH TEST: POSITIVE

## 2019-09-12 ENCOUNTER — OFFICE VISIT (OUTPATIENT)
Dept: BARIATRICS/WEIGHT MGMT | Age: 49
End: 2019-09-12
Payer: COMMERCIAL

## 2019-09-12 VITALS
DIASTOLIC BLOOD PRESSURE: 84 MMHG | HEIGHT: 66 IN | WEIGHT: 315 LBS | RESPIRATION RATE: 20 BRPM | SYSTOLIC BLOOD PRESSURE: 126 MMHG | BODY MASS INDEX: 50.62 KG/M2 | HEART RATE: 80 BPM

## 2019-09-12 DIAGNOSIS — G47.33 OBSTRUCTIVE SLEEP APNEA: Primary | ICD-10-CM

## 2019-09-12 DIAGNOSIS — E66.01 MORBID OBESITY (HCC): ICD-10-CM

## 2019-09-12 DIAGNOSIS — A04.8 H. PYLORI INFECTION: ICD-10-CM

## 2019-09-12 PROCEDURE — G8427 DOCREV CUR MEDS BY ELIG CLIN: HCPCS | Performed by: SURGERY

## 2019-09-12 PROCEDURE — 99213 OFFICE O/P EST LOW 20 MIN: CPT | Performed by: SURGERY

## 2019-09-12 PROCEDURE — G8417 CALC BMI ABV UP PARAM F/U: HCPCS | Performed by: SURGERY

## 2019-09-12 PROCEDURE — 1036F TOBACCO NON-USER: CPT | Performed by: SURGERY

## 2019-09-12 RX ORDER — BISMUTH SUBSALICYLATE 262 MG/1
524 TABLET, CHEWABLE ORAL 4 TIMES DAILY
Qty: 112 TABLET | Refills: 0 | Status: SHIPPED | OUTPATIENT
Start: 2019-09-12 | End: 2019-09-26

## 2019-09-12 RX ORDER — PANTOPRAZOLE SODIUM 40 MG/1
40 TABLET, DELAYED RELEASE ORAL DAILY
Qty: 30 TABLET | Refills: 3 | Status: SHIPPED | OUTPATIENT
Start: 2019-09-12 | End: 2020-01-29 | Stop reason: ALTCHOICE

## 2019-09-12 RX ORDER — CIPROFLOXACIN 500 MG/1
500 TABLET, FILM COATED ORAL 2 TIMES DAILY
Qty: 28 TABLET | Refills: 0 | Status: SHIPPED | OUTPATIENT
Start: 2019-09-12 | End: 2019-09-26

## 2019-09-12 RX ORDER — METRONIDAZOLE 500 MG/1
500 TABLET ORAL 3 TIMES DAILY
Qty: 42 TABLET | Refills: 0 | Status: SHIPPED | OUTPATIENT
Start: 2019-09-12 | End: 2019-09-26

## 2019-09-19 DIAGNOSIS — R06.2 WHEEZING: ICD-10-CM

## 2019-09-19 RX ORDER — FLUTICASONE FUROATE AND VILANTEROL 200; 25 UG/1; UG/1
1 POWDER RESPIRATORY (INHALATION) DAILY
Qty: 90 EACH | Refills: 1 | Status: SHIPPED | OUTPATIENT
Start: 2019-09-19 | End: 2019-09-20 | Stop reason: SDUPTHER

## 2019-09-19 NOTE — TELEPHONE ENCOUNTER
LOV 7/10/19  LRF 3/6/19  RTO 6 months    Health Maintenance   Topic Date Due    DTaP/Tdap/Td vaccine (1 - Tdap) 01/16/2020 (Originally [birth year]26/1989)    Flu vaccine (1) 01/17/2020 (Originally 9/1/2019)    Pneumococcal 0-64 years Vaccine (1 of 1 - PPSV23) 01/24/2020 (Originally 2/26/1976)    A1C test (Diabetic or Prediabetic)  10/26/2019    Potassium monitoring  09/03/2020    Creatinine monitoring  09/03/2020    Lipid screen  10/26/2023    HIV screen  Completed             (applicable per patient's age: Cancer Screenings, Depression Screening, Fall Risk Screening, Immunizations)    Hemoglobin A1C (%)   Date Value   10/26/2018 6.0   07/21/2018 5.9   04/09/2018 5.8     LDL Cholesterol (mg/dL)   Date Value   10/26/2018 118     LDL Calculated (mg/dL)   Date Value   06/05/2015 120     AST (U/L)   Date Value   10/26/2018 21     ALT (U/L)   Date Value   10/26/2018 20     BUN (mg/dL)   Date Value   09/03/2019 7      (goal A1C is < 7)   (goal LDL is <100) need 30-50% reduction from baseline     BP Readings from Last 3 Encounters:   09/12/19 126/84   09/09/19 126/72   09/03/19 (!) 152/98    (goal /80)      All Future Testing planned in [facility]:  Lab Frequency Next Occurrence   Surgical Pathology Once 07/31/2019   H.  Pylori Breath Test Once 09/19/2019       Next Visit Date:  Future Appointments   Date Time Provider Betty Garcia   10/4/2019  1:15 PM Ray Sharpe DO bariatric [facility]            Patient Active Problem List:     Vitamin D deficiency     Left shoulder pain     Scalp psoriasis     Prediabetes     Essential hypertension     Sleep apnea     Morbid obesity with BMI of 70 and over, adult ([address]     Pure hypercholesterolemia     Asthma     Indigestion     Acute superficial gastritis without hemorrhage     Positive H. pylori test

## 2019-09-20 DIAGNOSIS — R06.2 WHEEZING: ICD-10-CM

## 2019-09-20 RX ORDER — FLUTICASONE FUROATE AND VILANTEROL 200; 25 UG/1; UG/1
1 POWDER RESPIRATORY (INHALATION) DAILY
Qty: 90 EACH | Refills: 1 | Status: SHIPPED | OUTPATIENT
Start: 2019-09-20 | End: 2020-08-31

## 2019-09-20 NOTE — TELEPHONE ENCOUNTER
LOV 7/10/19  LRF 3/6/19 - Prescription was sent to wrong pharmacy  RTO 6 months    Health Maintenance   Topic Date Due    DTaP/Tdap/Td vaccine (1 - Tdap) 01/16/2020 (Originally [birth year]26[birth year]1989)    Flu vaccine (1) 01/17/2020 (Originally 9/1/2019)    Pneumococcal 0-64 years Vaccine (1 of 1 - PPSV23) 01/24/2020 (Originally 2/26/1976)    A1C test (Diabetic or Prediabetic)  10/26/2019    Potassium monitoring  09/03/2020    Creatinine monitoring  09/03/2020    Lipid screen  10/26/2023    HIV screen  Completed             (applicable per patient's age: Cancer Screenings, Depression Screening, Fall Risk Screening, Immunizations)    Hemoglobin A1C (%)   Date Value   10/26/2018 6.0   07/21/2018 5.9   04/09/2018 5.8     LDL Cholesterol (mg/dL)   Date Value   10/26/2018 118     LDL Calculated (mg/dL)   Date Value   06/05/2015 120     AST (U/L)   Date Value   10/26/2018 21     ALT (U/L)   Date Value   10/26/2018 20     BUN (mg/dL)   Date Value   09/03/2019 7      (goal A1C is < 7)   (goal LDL is <100) need 30-50% reduction from baseline     BP Readings from Last 3 Encounters:   09/12/19 126/84   09/09/19 126/72   09/03/19 (!) 152/98    (goal /80)      All Future Testing planned in [facility]:  Lab Frequency Next Occurrence   Surgical Pathology Once 07/31/2019   H.  Pylori Breath Test Once 09/19/2019       Next Visit Date:  Future Appointments   Date Time Provider Betty Garcia   10/4/2019  1:15 PM DO daryl Lopez esc[facility]            Patient Active Problem List:     Vitamin D deficiency     Left shoulder pain     Scalp psoriasis     Prediabetes     Essential hypertension     Sleep apnea     Morbid obesity with BMI of 70 and over, adult ([address]     Pure hypercholesterolemia     Asthma     Indigestion     Acute superficial gastritis without hemorrhage     Positive H. pylori test

## 2019-10-04 ENCOUNTER — HOSPITAL ENCOUNTER (OUTPATIENT)
Age: 49
Discharge: HOME | End: 2019-10-04
Payer: COMMERCIAL

## 2019-10-04 DIAGNOSIS — A04.8 H. PYLORI INFECTION: ICD-10-CM

## 2019-10-04 PROCEDURE — 83014 H PYLORI DRUG ADMIN: CPT

## 2019-10-04 PROCEDURE — 83013 H PYLORI (C-13) BREATH: CPT

## 2019-10-06 LAB — H PYLORI BREATH TEST: NEGATIVE

## 2019-10-22 ENCOUNTER — ANESTHESIA EVENT (OUTPATIENT)
Dept: OPERATING ROOM | Age: 49
End: 2019-10-22
Payer: COMMERCIAL

## 2019-10-22 ENCOUNTER — HOSPITAL ENCOUNTER (OUTPATIENT)
Age: 49
Setting detail: OBSERVATION
Discharge: HOME | End: 2019-10-23
Attending: SURGERY | Admitting: SURGERY
Payer: COMMERCIAL

## 2019-10-22 ENCOUNTER — ANESTHESIA (OUTPATIENT)
Dept: OPERATING ROOM | Age: 49
End: 2019-10-22
Payer: COMMERCIAL

## 2019-10-22 VITALS
TEMPERATURE: 95.1 F | SYSTOLIC BLOOD PRESSURE: 137 MMHG | RESPIRATION RATE: 29 BRPM | DIASTOLIC BLOOD PRESSURE: 83 MMHG | OXYGEN SATURATION: 97 %

## 2019-10-22 DIAGNOSIS — Z98.84 S/P LAPAROSCOPIC SLEEVE GASTRECTOMY: Primary | ICD-10-CM

## 2019-10-22 PROBLEM — E66.01 MORBID OBESITY (HCC): Status: ACTIVE | Noted: 2019-10-22

## 2019-10-22 LAB
ANION GAP SERPL CALCULATED.3IONS-SCNC: 18 MMOL/L (ref 9–17)
BUN BLDV-MCNC: 12 MG/DL (ref 6–20)
BUN/CREAT BLD: ABNORMAL (ref 9–20)
CALCIUM SERPL-MCNC: 9 MG/DL (ref 8.6–10.4)
CHLORIDE BLD-SCNC: 103 MMOL/L (ref 98–107)
CO2: 20 MMOL/L (ref 20–31)
CREAT SERPL-MCNC: 0.81 MG/DL (ref 0.7–1.2)
GFR AFRICAN AMERICAN: >60 ML/MIN
GFR NON-AFRICAN AMERICAN: >60 ML/MIN
GFR SERPL CREATININE-BSD FRML MDRD: ABNORMAL ML/MIN/{1.73_M2}
GFR SERPL CREATININE-BSD FRML MDRD: ABNORMAL ML/MIN/{1.73_M2}
GLUCOSE BLD-MCNC: 111 MG/DL (ref 75–110)
GLUCOSE BLD-MCNC: 172 MG/DL (ref 75–110)
GLUCOSE BLD-MCNC: 179 MG/DL (ref 75–110)
GLUCOSE BLD-MCNC: 190 MG/DL (ref 70–99)
HCT VFR BLD CALC: 46.3 % (ref 40.7–50.3)
HEMOGLOBIN: 15 G/DL (ref 13–17)
MCH RBC QN AUTO: 28.3 PG (ref 25.2–33.5)
MCHC RBC AUTO-ENTMCNC: 32.4 G/DL (ref 28.4–34.8)
MCV RBC AUTO: 87.4 FL (ref 82.6–102.9)
NRBC AUTOMATED: 0 PER 100 WBC
PDW BLD-RTO: 12.8 % (ref 11.8–14.4)
PLATELET # BLD: 224 K/UL (ref 138–453)
PMV BLD AUTO: 10.2 FL (ref 8.1–13.5)
POTASSIUM SERPL-SCNC: 4.3 MMOL/L (ref 3.7–5.3)
RBC # BLD: 5.3 M/UL (ref 4.21–5.77)
SODIUM BLD-SCNC: 141 MMOL/L (ref 135–144)
WBC # BLD: 13.4 K/UL (ref 3.5–11.3)

## 2019-10-22 PROCEDURE — 6360000002 HC RX W HCPCS: Performed by: SURGERY

## 2019-10-22 PROCEDURE — 6360000002 HC RX W HCPCS: Performed by: NURSE ANESTHETIST, CERTIFIED REGISTERED

## 2019-10-22 PROCEDURE — 2580000003 HC RX 258: Performed by: SURGERY

## 2019-10-22 PROCEDURE — 3700000001 HC ADD 15 MINUTES (ANESTHESIA): Performed by: SURGERY

## 2019-10-22 PROCEDURE — 7100000001 HC PACU RECOVERY - ADDTL 15 MIN: Performed by: SURGERY

## 2019-10-22 PROCEDURE — 3600000019 HC SURGERY ROBOT ADDTL 15MIN: Performed by: SURGERY

## 2019-10-22 PROCEDURE — 2580000003 HC RX 258: Performed by: ANESTHESIOLOGY

## 2019-10-22 PROCEDURE — 7100000000 HC PACU RECOVERY - FIRST 15 MIN: Performed by: SURGERY

## 2019-10-22 PROCEDURE — 3700000000 HC ANESTHESIA ATTENDED CARE: Performed by: SURGERY

## 2019-10-22 PROCEDURE — 96374 THER/PROPH/DIAG INJ IV PUSH: CPT

## 2019-10-22 PROCEDURE — 88342 IMHCHEM/IMCYTCHM 1ST ANTB: CPT

## 2019-10-22 PROCEDURE — 2709999900 HC NON-CHARGEABLE SUPPLY: Performed by: SURGERY

## 2019-10-22 PROCEDURE — 2500000003 HC RX 250 WO HCPCS: Performed by: SURGERY

## 2019-10-22 PROCEDURE — 2500000003 HC RX 250 WO HCPCS: Performed by: NURSE ANESTHETIST, CERTIFIED REGISTERED

## 2019-10-22 PROCEDURE — 2720000010 HC SURG SUPPLY STERILE: Performed by: SURGERY

## 2019-10-22 PROCEDURE — S2900 ROBOTIC SURGICAL SYSTEM: HCPCS | Performed by: SURGERY

## 2019-10-22 PROCEDURE — G0378 HOSPITAL OBSERVATION PER HR: HCPCS

## 2019-10-22 PROCEDURE — 6360000002 HC RX W HCPCS: Performed by: ANESTHESIOLOGY

## 2019-10-22 PROCEDURE — 82947 ASSAY GLUCOSE BLOOD QUANT: CPT

## 2019-10-22 PROCEDURE — 3600000009 HC SURGERY ROBOT BASE: Performed by: SURGERY

## 2019-10-22 PROCEDURE — 43775 LAP SLEEVE GASTRECTOMY: CPT | Performed by: SURGERY

## 2019-10-22 PROCEDURE — 6370000000 HC RX 637 (ALT 250 FOR IP): Performed by: NURSE ANESTHETIST, CERTIFIED REGISTERED

## 2019-10-22 PROCEDURE — 88307 TISSUE EXAM BY PATHOLOGIST: CPT

## 2019-10-22 PROCEDURE — 36415 COLL VENOUS BLD VENIPUNCTURE: CPT

## 2019-10-22 PROCEDURE — 2580000003 HC RX 258: Performed by: NURSE ANESTHETIST, CERTIFIED REGISTERED

## 2019-10-22 PROCEDURE — L3650 SO 8 ABD RESTRAINT PRE OTS: HCPCS | Performed by: SURGERY

## 2019-10-22 PROCEDURE — 80048 BASIC METABOLIC PNL TOTAL CA: CPT

## 2019-10-22 PROCEDURE — 85027 COMPLETE CBC AUTOMATED: CPT

## 2019-10-22 RX ORDER — OXYCODONE HYDROCHLORIDE AND ACETAMINOPHEN 5; 325 MG/1; MG/1
1 TABLET ORAL EVERY 4 HOURS PRN
Status: DISCONTINUED | OUTPATIENT
Start: 2019-10-22 | End: 2019-10-22

## 2019-10-22 RX ORDER — MEPERIDINE HYDROCHLORIDE 50 MG/ML
12.5 INJECTION INTRAMUSCULAR; INTRAVENOUS; SUBCUTANEOUS EVERY 5 MIN PRN
Status: DISCONTINUED | OUTPATIENT
Start: 2019-10-22 | End: 2019-10-22 | Stop reason: HOSPADM

## 2019-10-22 RX ORDER — NEOSTIGMINE METHYLSULFATE 5 MG/5 ML
SYRINGE (ML) INTRAVENOUS PRN
Status: DISCONTINUED | OUTPATIENT
Start: 2019-10-22 | End: 2019-10-22 | Stop reason: SDUPTHER

## 2019-10-22 RX ORDER — ALBUTEROL SULFATE 90 UG/1
AEROSOL, METERED RESPIRATORY (INHALATION) PRN
Status: DISCONTINUED | OUTPATIENT
Start: 2019-10-22 | End: 2019-10-22 | Stop reason: SDUPTHER

## 2019-10-22 RX ORDER — MAGNESIUM HYDROXIDE 1200 MG/15ML
LIQUID ORAL CONTINUOUS PRN
Status: COMPLETED | OUTPATIENT
Start: 2019-10-22 | End: 2019-10-22

## 2019-10-22 RX ORDER — PROPOFOL 10 MG/ML
INJECTION, EMULSION INTRAVENOUS CONTINUOUS PRN
Status: DISCONTINUED | OUTPATIENT
Start: 2019-10-22 | End: 2019-10-22 | Stop reason: SDUPTHER

## 2019-10-22 RX ORDER — FENTANYL CITRATE 50 UG/ML
50 INJECTION, SOLUTION INTRAMUSCULAR; INTRAVENOUS EVERY 5 MIN PRN
Status: DISCONTINUED | OUTPATIENT
Start: 2019-10-22 | End: 2019-10-22 | Stop reason: HOSPADM

## 2019-10-22 RX ORDER — LIDOCAINE HYDROCHLORIDE 10 MG/ML
INJECTION, SOLUTION EPIDURAL; INFILTRATION; INTRACAUDAL; PERINEURAL PRN
Status: DISCONTINUED | OUTPATIENT
Start: 2019-10-22 | End: 2019-10-22 | Stop reason: SDUPTHER

## 2019-10-22 RX ORDER — 0.9 % SODIUM CHLORIDE 0.9 %
VIAL (ML) INJECTION PRN
Status: DISCONTINUED | OUTPATIENT
Start: 2019-10-22 | End: 2019-10-22 | Stop reason: SDUPTHER

## 2019-10-22 RX ORDER — ONDANSETRON 2 MG/ML
4 INJECTION INTRAMUSCULAR; INTRAVENOUS EVERY 6 HOURS PRN
Status: DISCONTINUED | OUTPATIENT
Start: 2019-10-22 | End: 2019-10-23 | Stop reason: HOSPADM

## 2019-10-22 RX ORDER — ONDANSETRON 2 MG/ML
INJECTION INTRAMUSCULAR; INTRAVENOUS PRN
Status: DISCONTINUED | OUTPATIENT
Start: 2019-10-22 | End: 2019-10-22 | Stop reason: SDUPTHER

## 2019-10-22 RX ORDER — CIPROFLOXACIN 2 MG/ML
400 INJECTION, SOLUTION INTRAVENOUS EVERY 12 HOURS
Status: COMPLETED | OUTPATIENT
Start: 2019-10-22 | End: 2019-10-23

## 2019-10-22 RX ORDER — OXYCODONE HYDROCHLORIDE AND ACETAMINOPHEN 5; 325 MG/1; MG/1
2 TABLET ORAL EVERY 4 HOURS PRN
Status: DISCONTINUED | OUTPATIENT
Start: 2019-10-22 | End: 2019-10-22

## 2019-10-22 RX ORDER — ROCURONIUM BROMIDE 10 MG/ML
INJECTION, SOLUTION INTRAVENOUS PRN
Status: DISCONTINUED | OUTPATIENT
Start: 2019-10-22 | End: 2019-10-22 | Stop reason: SDUPTHER

## 2019-10-22 RX ORDER — CYCLOBENZAPRINE HCL 10 MG
10 TABLET ORAL 3 TIMES DAILY PRN
Status: DISCONTINUED | OUTPATIENT
Start: 2019-10-22 | End: 2019-10-23 | Stop reason: HOSPADM

## 2019-10-22 RX ORDER — PHENYLEPHRINE HYDROCHLORIDE 10 MG/ML
INJECTION INTRAVENOUS PRN
Status: DISCONTINUED | OUTPATIENT
Start: 2019-10-22 | End: 2019-10-22 | Stop reason: SDUPTHER

## 2019-10-22 RX ORDER — PROMETHAZINE HYDROCHLORIDE 25 MG/ML
12.5 INJECTION, SOLUTION INTRAMUSCULAR; INTRAVENOUS EVERY 6 HOURS PRN
Status: DISCONTINUED | OUTPATIENT
Start: 2019-10-22 | End: 2019-10-23 | Stop reason: HOSPADM

## 2019-10-22 RX ORDER — ACETAMINOPHEN 10 MG/ML
INJECTION, SOLUTION INTRAVENOUS PRN
Status: DISCONTINUED | OUTPATIENT
Start: 2019-10-22 | End: 2019-10-22 | Stop reason: SDUPTHER

## 2019-10-22 RX ORDER — LIDOCAINE HYDROCHLORIDE 20 MG/ML
JELLY TOPICAL PRN
Status: DISCONTINUED | OUTPATIENT
Start: 2019-10-22 | End: 2019-10-22 | Stop reason: SDUPTHER

## 2019-10-22 RX ORDER — SODIUM CHLORIDE, SODIUM LACTATE, POTASSIUM CHLORIDE, CALCIUM CHLORIDE 600; 310; 30; 20 MG/100ML; MG/100ML; MG/100ML; MG/100ML
INJECTION, SOLUTION INTRAVENOUS CONTINUOUS
Status: DISCONTINUED | OUTPATIENT
Start: 2019-10-22 | End: 2019-10-23 | Stop reason: HOSPADM

## 2019-10-22 RX ORDER — SODIUM CHLORIDE 0.9 % (FLUSH) 0.9 %
10 SYRINGE (ML) INJECTION EVERY 12 HOURS SCHEDULED
Status: DISCONTINUED | OUTPATIENT
Start: 2019-10-22 | End: 2019-10-23 | Stop reason: HOSPADM

## 2019-10-22 RX ORDER — CIPROFLOXACIN 2 MG/ML
400 INJECTION, SOLUTION INTRAVENOUS ONCE
Status: COMPLETED | OUTPATIENT
Start: 2019-10-22 | End: 2019-10-22

## 2019-10-22 RX ORDER — SODIUM CHLORIDE, SODIUM LACTATE, POTASSIUM CHLORIDE, CALCIUM CHLORIDE 600; 310; 30; 20 MG/100ML; MG/100ML; MG/100ML; MG/100ML
INJECTION, SOLUTION INTRAVENOUS CONTINUOUS
Status: DISCONTINUED | OUTPATIENT
Start: 2019-10-22 | End: 2019-10-22

## 2019-10-22 RX ORDER — HEPARIN SODIUM 5000 [USP'U]/ML
5000 INJECTION, SOLUTION INTRAVENOUS; SUBCUTANEOUS ONCE
Status: COMPLETED | OUTPATIENT
Start: 2019-10-22 | End: 2019-10-22

## 2019-10-22 RX ORDER — GLYCOPYRROLATE 1 MG/5 ML
SYRINGE (ML) INTRAVENOUS PRN
Status: DISCONTINUED | OUTPATIENT
Start: 2019-10-22 | End: 2019-10-22 | Stop reason: SDUPTHER

## 2019-10-22 RX ORDER — FENTANYL CITRATE 50 UG/ML
INJECTION, SOLUTION INTRAMUSCULAR; INTRAVENOUS PRN
Status: DISCONTINUED | OUTPATIENT
Start: 2019-10-22 | End: 2019-10-22 | Stop reason: SDUPTHER

## 2019-10-22 RX ORDER — BUPIVACAINE HYDROCHLORIDE 5 MG/ML
INJECTION, SOLUTION EPIDURAL; INTRACAUDAL PRN
Status: DISCONTINUED | OUTPATIENT
Start: 2019-10-22 | End: 2019-10-22 | Stop reason: ALTCHOICE

## 2019-10-22 RX ORDER — ALBUTEROL SULFATE 90 UG/1
2 AEROSOL, METERED RESPIRATORY (INHALATION) EVERY 6 HOURS PRN
Status: DISCONTINUED | OUTPATIENT
Start: 2019-10-22 | End: 2019-10-23 | Stop reason: HOSPADM

## 2019-10-22 RX ORDER — OXYCODONE HYDROCHLORIDE AND ACETAMINOPHEN 5; 325 MG/1; MG/1
2 TABLET ORAL EVERY 4 HOURS PRN
Status: DISCONTINUED | OUTPATIENT
Start: 2019-10-22 | End: 2019-10-23 | Stop reason: HOSPADM

## 2019-10-22 RX ORDER — CEFAZOLIN SODIUM 1 G/50ML
1 INJECTION, SOLUTION INTRAVENOUS EVERY 8 HOURS
Status: DISCONTINUED | OUTPATIENT
Start: 2019-10-22 | End: 2019-10-22

## 2019-10-22 RX ORDER — DEXAMETHASONE SODIUM PHOSPHATE 10 MG/ML
INJECTION INTRAMUSCULAR; INTRAVENOUS PRN
Status: DISCONTINUED | OUTPATIENT
Start: 2019-10-22 | End: 2019-10-22 | Stop reason: SDUPTHER

## 2019-10-22 RX ORDER — DIPHENHYDRAMINE HYDROCHLORIDE 50 MG/ML
INJECTION INTRAMUSCULAR; INTRAVENOUS PRN
Status: DISCONTINUED | OUTPATIENT
Start: 2019-10-22 | End: 2019-10-22 | Stop reason: SDUPTHER

## 2019-10-22 RX ORDER — SODIUM CHLORIDE 0.9 % (FLUSH) 0.9 %
10 SYRINGE (ML) INJECTION PRN
Status: DISCONTINUED | OUTPATIENT
Start: 2019-10-22 | End: 2019-10-23 | Stop reason: HOSPADM

## 2019-10-22 RX ORDER — ONDANSETRON 2 MG/ML
4 INJECTION INTRAMUSCULAR; INTRAVENOUS
Status: DISCONTINUED | OUTPATIENT
Start: 2019-10-22 | End: 2019-10-22 | Stop reason: HOSPADM

## 2019-10-22 RX ORDER — PROPOFOL 10 MG/ML
INJECTION, EMULSION INTRAVENOUS PRN
Status: DISCONTINUED | OUTPATIENT
Start: 2019-10-22 | End: 2019-10-22 | Stop reason: SDUPTHER

## 2019-10-22 RX ORDER — MIDAZOLAM HYDROCHLORIDE 1 MG/ML
INJECTION INTRAMUSCULAR; INTRAVENOUS PRN
Status: DISCONTINUED | OUTPATIENT
Start: 2019-10-22 | End: 2019-10-22 | Stop reason: SDUPTHER

## 2019-10-22 RX ORDER — FENTANYL CITRATE 50 UG/ML
25 INJECTION, SOLUTION INTRAMUSCULAR; INTRAVENOUS EVERY 5 MIN PRN
Status: DISCONTINUED | OUTPATIENT
Start: 2019-10-22 | End: 2019-10-22 | Stop reason: HOSPADM

## 2019-10-22 RX ADMIN — ONDANSETRON 4 MG: 2 INJECTION, SOLUTION INTRAMUSCULAR; INTRAVENOUS at 13:45

## 2019-10-22 RX ADMIN — FENTANYL CITRATE 50 MCG: 50 INJECTION, SOLUTION INTRAMUSCULAR; INTRAVENOUS at 15:28

## 2019-10-22 RX ADMIN — CIPROFLOXACIN 400 MG: 2 INJECTION, SOLUTION INTRAVENOUS at 12:15

## 2019-10-22 RX ADMIN — SODIUM CHLORIDE 1 ML: 9 INJECTION INTRAMUSCULAR; INTRAVENOUS; SUBCUTANEOUS at 12:51

## 2019-10-22 RX ADMIN — ROCURONIUM BROMIDE 40 MG: 10 INJECTION INTRAVENOUS at 12:22

## 2019-10-22 RX ADMIN — HYDROMORPHONE HYDROCHLORIDE 0.5 MG: 1 INJECTION, SOLUTION INTRAMUSCULAR; INTRAVENOUS; SUBCUTANEOUS at 18:22

## 2019-10-22 RX ADMIN — METRONIDAZOLE 500 MG: 500 INJECTION, SOLUTION INTRAVENOUS at 12:20

## 2019-10-22 RX ADMIN — Medication 2 MG: at 13:56

## 2019-10-22 RX ADMIN — ALBUTEROL SULFATE 5 PUFF: 90 AEROSOL, METERED RESPIRATORY (INHALATION) at 13:50

## 2019-10-22 RX ADMIN — FENTANYL CITRATE 25 MCG: 50 INJECTION, SOLUTION INTRAMUSCULAR; INTRAVENOUS at 16:40

## 2019-10-22 RX ADMIN — ACETAMINOPHEN 1000 MG: 10 INJECTION, SOLUTION INTRAVENOUS at 12:23

## 2019-10-22 RX ADMIN — MIDAZOLAM HYDROCHLORIDE 2 MG: 1 INJECTION, SOLUTION INTRAMUSCULAR; INTRAVENOUS at 11:55

## 2019-10-22 RX ADMIN — ROCURONIUM BROMIDE 50 MG: 10 INJECTION INTRAVENOUS at 11:56

## 2019-10-22 RX ADMIN — Medication 12.5 MG: at 12:12

## 2019-10-22 RX ADMIN — SODIUM CHLORIDE, POTASSIUM CHLORIDE, SODIUM LACTATE AND CALCIUM CHLORIDE: 600; 310; 30; 20 INJECTION, SOLUTION INTRAVENOUS at 09:38

## 2019-10-22 RX ADMIN — PROPOFOL: 10 INJECTION, EMULSION INTRAVENOUS at 12:57

## 2019-10-22 RX ADMIN — PHENYLEPHRINE HYDROCHLORIDE 200 MCG: 10 INJECTION INTRAVENOUS at 12:37

## 2019-10-22 RX ADMIN — Medication 0.4 MG: at 13:58

## 2019-10-22 RX ADMIN — DEXAMETHASONE SODIUM PHOSPHATE 10 MG: 10 INJECTION INTRAMUSCULAR; INTRAVENOUS at 12:22

## 2019-10-22 RX ADMIN — Medication 0.4 MG: at 14:06

## 2019-10-22 RX ADMIN — KETAMINE HYDROCHLORIDE 5 MCG/KG/MIN: 100 INJECTION, SOLUTION, CONCENTRATE INTRAMUSCULAR; INTRAVENOUS at 12:11

## 2019-10-22 RX ADMIN — PROPOFOL 50 MCG/KG/MIN: 10 INJECTION, EMULSION INTRAVENOUS at 12:12

## 2019-10-22 RX ADMIN — SUGAMMADEX 500 MG: 100 INJECTION, SOLUTION INTRAVENOUS at 14:26

## 2019-10-22 RX ADMIN — PHENYLEPHRINE HYDROCHLORIDE 200 MCG: 10 INJECTION INTRAVENOUS at 12:41

## 2019-10-22 RX ADMIN — LIDOCAINE HYDROCHLORIDE 10 MG: 10 INJECTION, SOLUTION EPIDURAL; INFILTRATION; INTRACAUDAL; PERINEURAL at 11:54

## 2019-10-22 RX ADMIN — SODIUM CHLORIDE, POTASSIUM CHLORIDE, SODIUM LACTATE AND CALCIUM CHLORIDE: 600; 310; 30; 20 INJECTION, SOLUTION INTRAVENOUS at 12:53

## 2019-10-22 RX ADMIN — SODIUM CHLORIDE, POTASSIUM CHLORIDE, SODIUM LACTATE AND CALCIUM CHLORIDE: 600; 310; 30; 20 INJECTION, SOLUTION INTRAVENOUS at 18:24

## 2019-10-22 RX ADMIN — LIDOCAINE HYDROCHLORIDE 1 ML: 20 JELLY TOPICAL at 13:42

## 2019-10-22 RX ADMIN — ROCURONIUM BROMIDE 10 MG: 10 INJECTION INTRAVENOUS at 13:07

## 2019-10-22 RX ADMIN — PROPOFOL 300 MG: 10 INJECTION, EMULSION INTRAVENOUS at 11:57

## 2019-10-22 RX ADMIN — FAMOTIDINE 20 MG: 10 INJECTION, SOLUTION INTRAVENOUS at 21:01

## 2019-10-22 RX ADMIN — CIPROFLOXACIN 400 MG: 2 INJECTION, SOLUTION INTRAVENOUS at 18:22

## 2019-10-22 RX ADMIN — FENTANYL CITRATE 100 MCG: 50 INJECTION INTRAMUSCULAR; INTRAVENOUS at 11:55

## 2019-10-22 RX ADMIN — HEPARIN SODIUM 5000 UNITS: 5000 INJECTION INTRAVENOUS; SUBCUTANEOUS at 09:42

## 2019-10-22 RX ADMIN — Medication 2 MG: at 14:04

## 2019-10-22 RX ADMIN — LIDOCAINE HYDROCHLORIDE 30 ML/HR: 20 INJECTION, SOLUTION INFILTRATION; PERINEURAL at 12:11

## 2019-10-22 ASSESSMENT — PAIN DESCRIPTION - LOCATION: LOCATION: ABDOMEN

## 2019-10-22 ASSESSMENT — PULMONARY FUNCTION TESTS
PIF_VALUE: 23
PIF_VALUE: 26
PIF_VALUE: 28
PIF_VALUE: 30
PIF_VALUE: 30
PIF_VALUE: 1
PIF_VALUE: 30
PIF_VALUE: 36
PIF_VALUE: 28
PIF_VALUE: 27
PIF_VALUE: 30
PIF_VALUE: 27
PIF_VALUE: 30
PIF_VALUE: 29
PIF_VALUE: 29
PIF_VALUE: 30
PIF_VALUE: 30
PIF_VALUE: 24
PIF_VALUE: 27
PIF_VALUE: 30
PIF_VALUE: 29
PIF_VALUE: 29
PIF_VALUE: 28
PIF_VALUE: 22
PIF_VALUE: 30
PIF_VALUE: 39
PIF_VALUE: 29
PIF_VALUE: 30
PIF_VALUE: 30
PIF_VALUE: 35
PIF_VALUE: 3
PIF_VALUE: 30
PIF_VALUE: 30
PIF_VALUE: 2
PIF_VALUE: 1
PIF_VALUE: 28
PIF_VALUE: 28
PIF_VALUE: 30
PIF_VALUE: 29
PIF_VALUE: 22
PIF_VALUE: 27
PIF_VALUE: 30
PIF_VALUE: 23
PIF_VALUE: 30
PIF_VALUE: 29
PIF_VALUE: 30
PIF_VALUE: 24
PIF_VALUE: 29
PIF_VALUE: 1
PIF_VALUE: 29
PIF_VALUE: 1
PIF_VALUE: 16
PIF_VALUE: 29
PIF_VALUE: 30
PIF_VALUE: 22
PIF_VALUE: 30
PIF_VALUE: 29
PIF_VALUE: 23
PIF_VALUE: 29
PIF_VALUE: 30
PIF_VALUE: 28
PIF_VALUE: 2
PIF_VALUE: 29
PIF_VALUE: 30
PIF_VALUE: 27
PIF_VALUE: 30
PIF_VALUE: 24
PIF_VALUE: 29
PIF_VALUE: 29
PIF_VALUE: 30
PIF_VALUE: 30
PIF_VALUE: 3
PIF_VALUE: 3
PIF_VALUE: 30
PIF_VALUE: 27
PIF_VALUE: 25
PIF_VALUE: 30
PIF_VALUE: 27
PIF_VALUE: 27
PIF_VALUE: 26
PIF_VALUE: 3
PIF_VALUE: 2
PIF_VALUE: 27
PIF_VALUE: 3
PIF_VALUE: 29
PIF_VALUE: 28
PIF_VALUE: 30
PIF_VALUE: 22
PIF_VALUE: 27
PIF_VALUE: 30
PIF_VALUE: 10
PIF_VALUE: 29
PIF_VALUE: 23
PIF_VALUE: 27
PIF_VALUE: 2
PIF_VALUE: 24
PIF_VALUE: 30
PIF_VALUE: 30
PIF_VALUE: 15
PIF_VALUE: 28
PIF_VALUE: 29
PIF_VALUE: 27
PIF_VALUE: 30
PIF_VALUE: 29
PIF_VALUE: 0
PIF_VALUE: 29
PIF_VALUE: 30
PIF_VALUE: 29
PIF_VALUE: 24
PIF_VALUE: 4
PIF_VALUE: 29
PIF_VALUE: 23
PIF_VALUE: 30
PIF_VALUE: 27
PIF_VALUE: 24
PIF_VALUE: 29
PIF_VALUE: 30
PIF_VALUE: 30
PIF_VALUE: 23
PIF_VALUE: 30
PIF_VALUE: 28
PIF_VALUE: 3
PIF_VALUE: 29
PIF_VALUE: 23
PIF_VALUE: 26
PIF_VALUE: 30
PIF_VALUE: 30
PIF_VALUE: 23
PIF_VALUE: 30
PIF_VALUE: 28
PIF_VALUE: 29
PIF_VALUE: 23
PIF_VALUE: 30
PIF_VALUE: 29
PIF_VALUE: 23
PIF_VALUE: 29
PIF_VALUE: 30
PIF_VALUE: 27
PIF_VALUE: 27
PIF_VALUE: 3
PIF_VALUE: 22
PIF_VALUE: 29
PIF_VALUE: 30

## 2019-10-22 ASSESSMENT — PAIN SCALES - GENERAL
PAINLEVEL_OUTOF10: 5
PAINLEVEL_OUTOF10: 5
PAINLEVEL_OUTOF10: 8
PAINLEVEL_OUTOF10: 5

## 2019-10-22 ASSESSMENT — PAIN - FUNCTIONAL ASSESSMENT: PAIN_FUNCTIONAL_ASSESSMENT: 0-10

## 2019-10-22 ASSESSMENT — PAIN DESCRIPTION - ORIENTATION: ORIENTATION: MID

## 2019-10-22 ASSESSMENT — PAIN DESCRIPTION - PAIN TYPE: TYPE: SURGICAL PAIN

## 2019-10-23 ENCOUNTER — APPOINTMENT (OUTPATIENT)
Dept: GENERAL RADIOLOGY | Age: 49
End: 2019-10-23
Attending: SURGERY
Payer: COMMERCIAL

## 2019-10-23 VITALS
TEMPERATURE: 98.5 F | RESPIRATION RATE: 16 BRPM | HEIGHT: 66 IN | OXYGEN SATURATION: 95 % | WEIGHT: 315 LBS | SYSTOLIC BLOOD PRESSURE: 113 MMHG | DIASTOLIC BLOOD PRESSURE: 60 MMHG | BODY MASS INDEX: 50.62 KG/M2 | HEART RATE: 66 BPM

## 2019-10-23 LAB
ABSOLUTE EOS #: <0.03 K/UL (ref 0–0.44)
ABSOLUTE IMMATURE GRANULOCYTE: 0.08 K/UL (ref 0–0.3)
ABSOLUTE LYMPH #: 1.08 K/UL (ref 1.1–3.7)
ABSOLUTE MONO #: 1.15 K/UL (ref 0.1–1.2)
ANION GAP SERPL CALCULATED.3IONS-SCNC: 9 MMOL/L (ref 9–17)
BASOPHILS # BLD: 0 % (ref 0–2)
BASOPHILS ABSOLUTE: <0.03 K/UL (ref 0–0.2)
BUN BLDV-MCNC: 10 MG/DL (ref 6–20)
BUN/CREAT BLD: ABNORMAL (ref 9–20)
CALCIUM SERPL-MCNC: 8.7 MG/DL (ref 8.6–10.4)
CHLORIDE BLD-SCNC: 102 MMOL/L (ref 98–107)
CO2: 25 MMOL/L (ref 20–31)
CREAT SERPL-MCNC: 0.71 MG/DL (ref 0.7–1.2)
DIFFERENTIAL TYPE: ABNORMAL
EOSINOPHILS RELATIVE PERCENT: 0 % (ref 1–4)
GFR AFRICAN AMERICAN: >60 ML/MIN
GFR NON-AFRICAN AMERICAN: >60 ML/MIN
GFR SERPL CREATININE-BSD FRML MDRD: ABNORMAL ML/MIN/{1.73_M2}
GFR SERPL CREATININE-BSD FRML MDRD: ABNORMAL ML/MIN/{1.73_M2}
GLUCOSE BLD-MCNC: 135 MG/DL (ref 70–99)
HCT VFR BLD CALC: 42.7 % (ref 40.7–50.3)
HEMOGLOBIN: 14.5 G/DL (ref 13–17)
IMMATURE GRANULOCYTES: 1 %
LYMPHOCYTES # BLD: 8 % (ref 24–43)
MCH RBC QN AUTO: 28.7 PG (ref 25.2–33.5)
MCHC RBC AUTO-ENTMCNC: 34 G/DL (ref 28.4–34.8)
MCV RBC AUTO: 84.4 FL (ref 82.6–102.9)
MONOCYTES # BLD: 8 % (ref 3–12)
NRBC AUTOMATED: 0 PER 100 WBC
PDW BLD-RTO: 12.9 % (ref 11.8–14.4)
PLATELET # BLD: 224 K/UL (ref 138–453)
PLATELET ESTIMATE: ABNORMAL
PMV BLD AUTO: 10.3 FL (ref 8.1–13.5)
POTASSIUM SERPL-SCNC: 4.5 MMOL/L (ref 3.7–5.3)
RBC # BLD: 5.06 M/UL (ref 4.21–5.77)
RBC # BLD: ABNORMAL 10*6/UL
SEG NEUTROPHILS: 83 % (ref 36–65)
SEGMENTED NEUTROPHILS ABSOLUTE COUNT: 11.94 K/UL (ref 1.5–8.1)
SODIUM BLD-SCNC: 136 MMOL/L (ref 135–144)
WBC # BLD: 14.3 K/UL (ref 3.5–11.3)
WBC # BLD: ABNORMAL 10*3/UL

## 2019-10-23 PROCEDURE — G0378 HOSPITAL OBSERVATION PER HR: HCPCS

## 2019-10-23 PROCEDURE — 36415 COLL VENOUS BLD VENIPUNCTURE: CPT

## 2019-10-23 PROCEDURE — 2580000003 HC RX 258: Performed by: SURGERY

## 2019-10-23 PROCEDURE — 2500000003 HC RX 250 WO HCPCS: Performed by: SURGERY

## 2019-10-23 PROCEDURE — 80048 BASIC METABOLIC PNL TOTAL CA: CPT

## 2019-10-23 PROCEDURE — 85025 COMPLETE CBC W/AUTO DIFF WBC: CPT

## 2019-10-23 PROCEDURE — 96375 TX/PRO/DX INJ NEW DRUG ADDON: CPT

## 2019-10-23 PROCEDURE — 6360000002 HC RX W HCPCS: Performed by: SURGERY

## 2019-10-23 PROCEDURE — 96376 TX/PRO/DX INJ SAME DRUG ADON: CPT

## 2019-10-23 PROCEDURE — 74220 X-RAY XM ESOPHAGUS 1CNTRST: CPT

## 2019-10-23 PROCEDURE — 96372 THER/PROPH/DIAG INJ SC/IM: CPT

## 2019-10-23 PROCEDURE — 6360000004 HC RX CONTRAST MEDICATION: Performed by: SURGERY

## 2019-10-23 PROCEDURE — 6370000000 HC RX 637 (ALT 250 FOR IP): Performed by: SURGERY

## 2019-10-23 RX ORDER — HEPARIN SODIUM 5000 [USP'U]/ML
5000 INJECTION, SOLUTION INTRAVENOUS; SUBCUTANEOUS EVERY 8 HOURS SCHEDULED
Status: DISCONTINUED | OUTPATIENT
Start: 2019-10-23 | End: 2019-10-23 | Stop reason: HOSPADM

## 2019-10-23 RX ORDER — PROMETHAZINE HYDROCHLORIDE 25 MG/1
25 TABLET ORAL 3 TIMES DAILY PRN
Qty: 30 TABLET | Refills: 0 | Status: SHIPPED | OUTPATIENT
Start: 2019-10-23 | End: 2019-11-02

## 2019-10-23 RX ORDER — OXYCODONE AND ACETAMINOPHEN 7.5; 325 MG/1; MG/1
1 TABLET ORAL EVERY 6 HOURS PRN
Qty: 28 TABLET | Refills: 0 | Status: SHIPPED | OUTPATIENT
Start: 2019-10-23 | End: 2019-10-30

## 2019-10-23 RX ADMIN — CIPROFLOXACIN 400 MG: 2 INJECTION, SOLUTION INTRAVENOUS at 05:58

## 2019-10-23 RX ADMIN — OXYCODONE HYDROCHLORIDE AND ACETAMINOPHEN 2 TABLET: 5; 325 TABLET ORAL at 02:32

## 2019-10-23 RX ADMIN — IOHEXOL 30 ML: 240 INJECTION, SOLUTION INTRATHECAL; INTRAVASCULAR; INTRAVENOUS; ORAL at 09:49

## 2019-10-23 RX ADMIN — Medication 10 ML: at 08:48

## 2019-10-23 RX ADMIN — FAMOTIDINE 20 MG: 10 INJECTION, SOLUTION INTRAVENOUS at 08:48

## 2019-10-23 RX ADMIN — HYDROMORPHONE HYDROCHLORIDE 0.5 MG: 1 INJECTION, SOLUTION INTRAMUSCULAR; INTRAVENOUS; SUBCUTANEOUS at 03:36

## 2019-10-23 RX ADMIN — HEPARIN SODIUM 5000 UNITS: 5000 INJECTION INTRAVENOUS; SUBCUTANEOUS at 08:47

## 2019-10-23 ASSESSMENT — PAIN SCALES - GENERAL
PAINLEVEL_OUTOF10: 3
PAINLEVEL_OUTOF10: 7
PAINLEVEL_OUTOF10: 5
PAINLEVEL_OUTOF10: 2
PAINLEVEL_OUTOF10: 3

## 2019-10-23 ASSESSMENT — PAIN DESCRIPTION - PAIN TYPE: TYPE: SURGICAL PAIN

## 2019-10-23 ASSESSMENT — PAIN DESCRIPTION - PROGRESSION: CLINICAL_PROGRESSION: NOT CHANGED

## 2019-10-23 ASSESSMENT — PAIN DESCRIPTION - LOCATION: LOCATION: ABDOMEN

## 2019-10-23 ASSESSMENT — PAIN DESCRIPTION - ORIENTATION: ORIENTATION: LOWER;RIGHT;LEFT

## 2019-10-23 ASSESSMENT — PAIN DESCRIPTION - ONSET: ONSET: ON-GOING

## 2019-10-23 ASSESSMENT — PAIN DESCRIPTION - DESCRIPTORS: DESCRIPTORS: ACHING;DISCOMFORT

## 2019-10-23 ASSESSMENT — PAIN DESCRIPTION - FREQUENCY: FREQUENCY: CONTINUOUS

## 2019-10-25 ENCOUNTER — TELEPHONE (OUTPATIENT)
Dept: BARIATRICS/WEIGHT MGMT | Age: 49
End: 2019-10-25

## 2019-10-25 LAB — SURGICAL PATHOLOGY REPORT: NORMAL

## 2019-11-01 ENCOUNTER — OFFICE VISIT (OUTPATIENT)
Dept: BARIATRICS/WEIGHT MGMT | Age: 49
End: 2019-11-01

## 2019-11-01 VITALS
WEIGHT: 315 LBS | DIASTOLIC BLOOD PRESSURE: 70 MMHG | SYSTOLIC BLOOD PRESSURE: 110 MMHG | BODY MASS INDEX: 50.62 KG/M2 | TEMPERATURE: 97.5 F | HEIGHT: 66 IN | HEART RATE: 70 BPM | RESPIRATION RATE: 20 BRPM

## 2019-11-01 DIAGNOSIS — Z98.84 STATUS POST BARIATRIC SURGERY: Primary | ICD-10-CM

## 2019-11-01 PROCEDURE — 99024 POSTOP FOLLOW-UP VISIT: CPT | Performed by: SURGERY

## 2019-11-01 RX ORDER — M-VIT,TX,IRON,MINS/CALC/FOLIC 27MG-0.4MG
1 TABLET ORAL DAILY
COMMUNITY

## 2019-11-01 RX ORDER — OXYCODONE HYDROCHLORIDE AND ACETAMINOPHEN 5; 325 MG/1; MG/1
1 TABLET ORAL EVERY 4 HOURS PRN
COMMUNITY
End: 2020-01-29

## 2019-11-01 RX ORDER — CALCIUM CARBONATE 200(500)MG
1 TABLET,CHEWABLE ORAL DAILY
COMMUNITY

## 2019-11-13 ENCOUNTER — NURSE ONLY (OUTPATIENT)
Dept: BARIATRICS/WEIGHT MGMT | Age: 49
End: 2019-11-13

## 2019-11-29 ENCOUNTER — OFFICE VISIT (OUTPATIENT)
Dept: BARIATRICS/WEIGHT MGMT | Age: 49
End: 2019-11-29

## 2019-11-29 VITALS
RESPIRATION RATE: 20 BRPM | WEIGHT: 315 LBS | HEART RATE: 68 BPM | DIASTOLIC BLOOD PRESSURE: 84 MMHG | SYSTOLIC BLOOD PRESSURE: 122 MMHG | BODY MASS INDEX: 50.62 KG/M2 | HEIGHT: 66 IN

## 2019-11-29 DIAGNOSIS — Z98.84 STATUS POST BARIATRIC SURGERY: Primary | ICD-10-CM

## 2019-11-29 PROCEDURE — 99024 POSTOP FOLLOW-UP VISIT: CPT | Performed by: SURGERY

## 2019-11-29 RX ORDER — URSODIOL 300 MG/1
300 CAPSULE ORAL 2 TIMES DAILY
Qty: 180 CAPSULE | Refills: 0 | Status: SHIPPED | OUTPATIENT
Start: 2019-11-29 | End: 2020-01-29 | Stop reason: ALTCHOICE

## 2020-01-27 ENCOUNTER — HOSPITAL ENCOUNTER (OUTPATIENT)
Age: 50
Discharge: HOME | End: 2020-01-27
Payer: COMMERCIAL

## 2020-01-27 LAB
ABSOLUTE EOS #: 0.25 K/UL (ref 0–0.44)
ABSOLUTE IMMATURE GRANULOCYTE: 0.03 K/UL (ref 0–0.3)
ABSOLUTE LYMPH #: 2.74 K/UL (ref 1.1–3.7)
ABSOLUTE MONO #: 0.8 K/UL (ref 0.1–1.2)
ALBUMIN SERPL-MCNC: 4.1 G/DL (ref 3.5–5.2)
ALBUMIN/GLOBULIN RATIO: 1.2 (ref 1–2.5)
ALP BLD-CCNC: 66 U/L (ref 40–129)
ALT SERPL-CCNC: 21 U/L (ref 5–41)
ANION GAP SERPL CALCULATED.3IONS-SCNC: 12 MMOL/L (ref 9–17)
AST SERPL-CCNC: 21 U/L
BASOPHILS # BLD: 1 % (ref 0–2)
BASOPHILS ABSOLUTE: 0.06 K/UL (ref 0–0.2)
BILIRUB SERPL-MCNC: 0.44 MG/DL (ref 0.3–1.2)
BUN BLDV-MCNC: 14 MG/DL (ref 6–20)
BUN/CREAT BLD: ABNORMAL (ref 9–20)
CALCIUM SERPL-MCNC: 9.4 MG/DL (ref 8.6–10.4)
CHLORIDE BLD-SCNC: 99 MMOL/L (ref 98–107)
CO2: 26 MMOL/L (ref 20–31)
CREAT SERPL-MCNC: 0.78 MG/DL (ref 0.7–1.2)
DIFFERENTIAL TYPE: NORMAL
EOSINOPHILS RELATIVE PERCENT: 3 % (ref 1–4)
GFR AFRICAN AMERICAN: >60 ML/MIN
GFR NON-AFRICAN AMERICAN: >60 ML/MIN
GFR SERPL CREATININE-BSD FRML MDRD: ABNORMAL ML/MIN/{1.73_M2}
GFR SERPL CREATININE-BSD FRML MDRD: ABNORMAL ML/MIN/{1.73_M2}
GLUCOSE BLD-MCNC: 105 MG/DL (ref 70–99)
HCT VFR BLD CALC: 49.1 % (ref 40.7–50.3)
HEMOGLOBIN: 15.8 G/DL (ref 13–17)
IMMATURE GRANULOCYTES: 0 %
LYMPHOCYTES # BLD: 30 % (ref 24–43)
MAGNESIUM: 2.5 MG/DL (ref 1.6–2.6)
MCH RBC QN AUTO: 28.8 PG (ref 25.2–33.5)
MCHC RBC AUTO-ENTMCNC: 32.2 G/DL (ref 28.4–34.8)
MCV RBC AUTO: 89.6 FL (ref 82.6–102.9)
MONOCYTES # BLD: 9 % (ref 3–12)
NRBC AUTOMATED: 0 PER 100 WBC
PDW BLD-RTO: 12.7 % (ref 11.8–14.4)
PLATELET # BLD: 251 K/UL (ref 138–453)
PLATELET ESTIMATE: NORMAL
PMV BLD AUTO: 10.7 FL (ref 8.1–13.5)
POTASSIUM SERPL-SCNC: 4.1 MMOL/L (ref 3.7–5.3)
PTH INTACT: 70.27 PG/ML (ref 15–65)
RBC # BLD: 5.48 M/UL (ref 4.21–5.77)
RBC # BLD: NORMAL 10*6/UL
SEG NEUTROPHILS: 57 % (ref 36–65)
SEGMENTED NEUTROPHILS ABSOLUTE COUNT: 5.39 K/UL (ref 1.5–8.1)
SODIUM BLD-SCNC: 137 MMOL/L (ref 135–144)
THYROXINE, FREE: 1.3 NG/DL (ref 0.93–1.7)
TOTAL PROTEIN: 7.4 G/DL (ref 6.4–8.3)
TSH SERPL DL<=0.05 MIU/L-ACNC: 1.5 MIU/L (ref 0.3–5)
WBC # BLD: 9.3 K/UL (ref 3.5–11.3)
WBC # BLD: NORMAL 10*3/UL

## 2020-01-27 PROCEDURE — 84630 ASSAY OF ZINC: CPT

## 2020-01-27 PROCEDURE — 83036 HEMOGLOBIN GLYCOSYLATED A1C: CPT

## 2020-01-27 PROCEDURE — 83735 ASSAY OF MAGNESIUM: CPT

## 2020-01-27 PROCEDURE — 82607 VITAMIN B-12: CPT

## 2020-01-27 PROCEDURE — 80053 COMPREHEN METABOLIC PANEL: CPT

## 2020-01-27 PROCEDURE — 84590 ASSAY OF VITAMIN A: CPT

## 2020-01-27 PROCEDURE — 36415 COLL VENOUS BLD VENIPUNCTURE: CPT

## 2020-01-27 PROCEDURE — 82728 ASSAY OF FERRITIN: CPT

## 2020-01-27 PROCEDURE — 82306 VITAMIN D 25 HYDROXY: CPT

## 2020-01-27 PROCEDURE — 83970 ASSAY OF PARATHORMONE: CPT

## 2020-01-27 PROCEDURE — 84443 ASSAY THYROID STIM HORMONE: CPT

## 2020-01-27 PROCEDURE — 84439 ASSAY OF FREE THYROXINE: CPT

## 2020-01-27 PROCEDURE — 85025 COMPLETE CBC W/AUTO DIFF WBC: CPT

## 2020-01-27 PROCEDURE — 83540 ASSAY OF IRON: CPT

## 2020-01-27 PROCEDURE — 83550 IRON BINDING TEST: CPT

## 2020-01-27 PROCEDURE — 84425 ASSAY OF VITAMIN B-1: CPT

## 2020-01-28 LAB
ESTIMATED AVERAGE GLUCOSE: 108 MG/DL
FERRITIN: 163 UG/L (ref 30–400)
HBA1C MFR BLD: 5.4 % (ref 4–6)
IRON SATURATION: 14 % (ref 20–55)
IRON: 37 UG/DL (ref 59–158)
TOTAL IRON BINDING CAPACITY: 271 UG/DL (ref 250–450)
UNSATURATED IRON BINDING CAPACITY: 234 UG/DL (ref 112–347)
VITAMIN B-12: 327 PG/ML (ref 232–1245)
VITAMIN D 25-HYDROXY: 19.9 NG/ML (ref 30–100)

## 2020-01-29 ENCOUNTER — OFFICE VISIT (OUTPATIENT)
Dept: BARIATRICS/WEIGHT MGMT | Age: 50
End: 2020-01-29
Payer: COMMERCIAL

## 2020-01-29 VITALS
BODY MASS INDEX: 50.62 KG/M2 | SYSTOLIC BLOOD PRESSURE: 130 MMHG | HEART RATE: 80 BPM | HEIGHT: 66 IN | DIASTOLIC BLOOD PRESSURE: 80 MMHG | WEIGHT: 315 LBS

## 2020-01-29 PROBLEM — E61.1 LOW IRON: Status: ACTIVE | Noted: 2020-01-29

## 2020-01-29 PROBLEM — E78.00 PURE HYPERCHOLESTEROLEMIA: Status: RESOLVED | Noted: 2017-04-24 | Resolved: 2020-01-29

## 2020-01-29 PROBLEM — A04.8 POSITIVE H. PYLORI TEST: Status: RESOLVED | Noted: 2019-07-10 | Resolved: 2020-01-29

## 2020-01-29 PROBLEM — Z98.84 S/P LAPAROSCOPIC SLEEVE GASTRECTOMY: Status: ACTIVE | Noted: 2020-01-29

## 2020-01-29 PROCEDURE — G8427 DOCREV CUR MEDS BY ELIG CLIN: HCPCS | Performed by: NURSE PRACTITIONER

## 2020-01-29 PROCEDURE — G8484 FLU IMMUNIZE NO ADMIN: HCPCS | Performed by: NURSE PRACTITIONER

## 2020-01-29 PROCEDURE — 99213 OFFICE O/P EST LOW 20 MIN: CPT | Performed by: NURSE PRACTITIONER

## 2020-01-29 PROCEDURE — G8417 CALC BMI ABV UP PARAM F/U: HCPCS | Performed by: NURSE PRACTITIONER

## 2020-01-29 PROCEDURE — 1036F TOBACCO NON-USER: CPT | Performed by: NURSE PRACTITIONER

## 2020-01-29 RX ORDER — ERGOCALCIFEROL 1.25 MG/1
50000 CAPSULE ORAL WEEKLY
Qty: 8 CAPSULE | Refills: 0 | Status: SHIPPED | OUTPATIENT
Start: 2020-01-29 | End: 2020-06-01 | Stop reason: SDUPTHER

## 2020-01-29 RX ORDER — FERROUS SULFATE 325(65) MG
325 TABLET ORAL
Qty: 30 TABLET | Refills: 3 | Status: SHIPPED | OUTPATIENT
Start: 2020-01-29 | End: 2020-08-31

## 2020-01-29 NOTE — PROGRESS NOTES
 Heart Disease Mother     High Blood Pressure Mother     Depression Mother     Sleep Apnea Mother     Other Mother         GERD    High Cholesterol Father     Diabetes Father     High Blood Pressure Father     Asthma Father     Sleep Apnea Father     Cancer Sister 48        unsure of name, lived 2 weeks after diagnosis       Social History:  Social History     Socioeconomic History    Marital status: Legally      Spouse name: Not on file    Number of children: 0    Years of education: Not on file    Highest education level: Not on file   Occupational History    Not on file   Social Needs    Financial resource strain: Not on file    Food insecurity:     Worry: Not on file     Inability: Not on file    Transportation needs:     Medical: Not on file     Non-medical: Not on file   Tobacco Use    Smoking status: Never Smoker    Smokeless tobacco: Never Used   Substance and Sexual Activity    Alcohol use: Not Currently     Alcohol/week: 0.0 standard drinks     Comment: rarely    Drug use: Never    Sexual activity: Not Currently     Partners: Female     Comment: girlfriend had tubal   Lifestyle    Physical activity:     Days per week: Not on file     Minutes per session: Not on file    Stress: Not on file   Relationships    Social connections:     Talks on phone: Not on file     Gets together: Not on file     Attends Taoist service: Not on file     Active member of club or organization: Not on file     Attends meetings of clubs or organizations: Not on file     Relationship status: Not on file    Intimate partner violence:     Fear of current or ex partner: Not on file     Emotionally abused: Not on file     Physically abused: Not on file     Forced sexual activity: Not on file   Other Topics Concern    Not on file   Social History Narrative    Not on file       Current Medications:  Current Outpatient Medications   Medication Sig Dispense Refill    vitamin D (ERGOCALCIFEROL) Neurological: Negative for dizziness, tremors, light-headedness and headaches. Psychiatric/Behavioral: Negative for sleep disturbance and dysphoric mood. Objective:      Physical Exam   Vital signs reviewed. General: Well-developed and well-nourished. No acute distress. Skin: Warm, dry and intact. HEENT: Normocephalic. EOMs intact. Conjunctivae normal. Neck supple. Cardiovascular: Normal rate, regular rhythm. Pulmonary/Chest: Normal effort. Lungs clear to auscultation. No rales, rhonchi or wheezing. Abdominal: Positive bowel sounds. Soft, nontender. Nondistended. No rigidity, rebound, or guarding. Musculoskeletal: Movement x4. No edema. Neurological: Gait normal. Alert and oriented to person, place, and time. Psychiatric: Normal mood and affect. Speech and behavior normal. Judgment and thought content normal. Cognition and memory intact. Assessment:       Diagnosis Orders   1. Uncomplicated asthma, unspecified asthma severity, unspecified whether persistent  vitamin D (ERGOCALCIFEROL) 1.25 MG (89111 UT) CAPS capsule    ferrous sulfate (SAHIL-GOMEZ) 325 (65 Fe) MG tablet   2. Indigestion  vitamin D (ERGOCALCIFEROL) 1.25 MG (28991 UT) CAPS capsule    ferrous sulfate (SAHIL-GOMEZ) 325 (65 Fe) MG tablet   3. Obstructive sleep apnea syndrome  vitamin D (ERGOCALCIFEROL) 1.25 MG (28252 UT) CAPS capsule    ferrous sulfate (SAHIL-GOMEZ) 325 (65 Fe) MG tablet   4. Morbid obesity (HCC)  vitamin D (ERGOCALCIFEROL) 1.25 MG (86303 UT) CAPS capsule    ferrous sulfate (SAHIL-GOMEZ) 325 (65 Fe) MG tablet   5. Vitamin D deficiency  vitamin D (ERGOCALCIFEROL) 1.25 MG (32044 UT) CAPS capsule    ferrous sulfate (SAHIL-GOMEZ) 325 (65 Fe) MG tablet   6. Low iron  vitamin D (ERGOCALCIFEROL) 1.25 MG (39118 UT) CAPS capsule    ferrous sulfate (SAHIL-GOMEZ) 325 (65 Fe) MG tablet       Plan:    Dietitian visit today.   Patient to continue to increase protein to obtain 60-80g/day, at least 48-64oz of fluid daily, and gradually

## 2020-01-30 LAB — ZINC: 57.1 UG/DL (ref 60–120)

## 2020-01-31 LAB
RETINYL PALMITATE: 0.02 MG/L (ref 0–0.1)
VITAMIN A LEVEL: 0.34 MG/L (ref 0.3–1.2)
VITAMIN A, INTERP: NORMAL
VITAMIN B1 WHOLE BLOOD: 107 NMOL/L (ref 70–180)

## 2020-05-31 ENCOUNTER — HOSPITAL ENCOUNTER (OUTPATIENT)
Age: 50
Discharge: HOME | End: 2020-05-31
Payer: COMMERCIAL

## 2020-05-31 LAB
ABSOLUTE EOS #: 0.31 K/UL (ref 0–0.44)
ABSOLUTE IMMATURE GRANULOCYTE: <0.03 K/UL (ref 0–0.3)
ABSOLUTE LYMPH #: 2.48 K/UL (ref 1.1–3.7)
ABSOLUTE MONO #: 0.73 K/UL (ref 0.1–1.2)
ALBUMIN SERPL-MCNC: 4.1 G/DL (ref 3.5–5.2)
ALBUMIN/GLOBULIN RATIO: 1.4 (ref 1–2.5)
ALP BLD-CCNC: 68 U/L (ref 40–129)
ALT SERPL-CCNC: 16 U/L (ref 5–41)
ANION GAP SERPL CALCULATED.3IONS-SCNC: 13 MMOL/L (ref 9–17)
AST SERPL-CCNC: 19 U/L
BASOPHILS # BLD: 1 % (ref 0–2)
BASOPHILS ABSOLUTE: 0.06 K/UL (ref 0–0.2)
BILIRUB SERPL-MCNC: 0.48 MG/DL (ref 0.3–1.2)
BUN BLDV-MCNC: 12 MG/DL (ref 6–20)
BUN/CREAT BLD: NORMAL (ref 9–20)
CALCIUM SERPL-MCNC: 9.3 MG/DL (ref 8.6–10.4)
CHLORIDE BLD-SCNC: 104 MMOL/L (ref 98–107)
CHOLESTEROL/HDL RATIO: 3.5
CHOLESTEROL: 135 MG/DL
CO2: 25 MMOL/L (ref 20–31)
CREAT SERPL-MCNC: 0.75 MG/DL (ref 0.7–1.2)
DIFFERENTIAL TYPE: NORMAL
EOSINOPHILS RELATIVE PERCENT: 4 % (ref 1–4)
FERRITIN: 149 UG/L (ref 30–400)
FOLATE: 13 NG/ML
GFR AFRICAN AMERICAN: >60 ML/MIN
GFR NON-AFRICAN AMERICAN: >60 ML/MIN
GFR SERPL CREATININE-BSD FRML MDRD: NORMAL ML/MIN/{1.73_M2}
GFR SERPL CREATININE-BSD FRML MDRD: NORMAL ML/MIN/{1.73_M2}
GLUCOSE BLD-MCNC: 95 MG/DL (ref 70–99)
HCT VFR BLD CALC: 50 % (ref 40.7–50.3)
HDLC SERPL-MCNC: 39 MG/DL
HEMOGLOBIN: 16.6 G/DL (ref 13–17)
IMMATURE GRANULOCYTES: 0 %
IRON SATURATION: 34 % (ref 20–55)
IRON: 92 UG/DL (ref 59–158)
LDL CHOLESTEROL: 81 MG/DL (ref 0–130)
LYMPHOCYTES # BLD: 35 % (ref 24–43)
MAGNESIUM: 2.5 MG/DL (ref 1.6–2.6)
MCH RBC QN AUTO: 29.6 PG (ref 25.2–33.5)
MCHC RBC AUTO-ENTMCNC: 33.2 G/DL (ref 28.4–34.8)
MCV RBC AUTO: 89.3 FL (ref 82.6–102.9)
MONOCYTES # BLD: 10 % (ref 3–12)
NRBC AUTOMATED: 0 PER 100 WBC
PDW BLD-RTO: 12.8 % (ref 11.8–14.4)
PLATELET # BLD: 236 K/UL (ref 138–453)
PLATELET ESTIMATE: NORMAL
PMV BLD AUTO: 10.4 FL (ref 8.1–13.5)
POTASSIUM SERPL-SCNC: 4.6 MMOL/L (ref 3.7–5.3)
PTH INTACT: 66.47 PG/ML (ref 15–65)
RBC # BLD: 5.6 M/UL (ref 4.21–5.77)
RBC # BLD: NORMAL 10*6/UL
SEG NEUTROPHILS: 50 % (ref 36–65)
SEGMENTED NEUTROPHILS ABSOLUTE COUNT: 3.47 K/UL (ref 1.5–8.1)
SODIUM BLD-SCNC: 142 MMOL/L (ref 135–144)
THYROXINE, FREE: 1.15 NG/DL (ref 0.93–1.7)
TOTAL IRON BINDING CAPACITY: 274 UG/DL (ref 250–450)
TOTAL PROTEIN: 7.1 G/DL (ref 6.4–8.3)
TRIGL SERPL-MCNC: 74 MG/DL
TSH SERPL DL<=0.05 MIU/L-ACNC: 1.58 MIU/L (ref 0.3–5)
UNSATURATED IRON BINDING CAPACITY: 182 UG/DL (ref 112–347)
VITAMIN B-12: 233 PG/ML (ref 232–1245)
VITAMIN D 25-HYDROXY: 22.3 NG/ML (ref 30–100)
VLDLC SERPL CALC-MCNC: ABNORMAL MG/DL (ref 1–30)
WBC # BLD: 7.1 K/UL (ref 3.5–11.3)
WBC # BLD: NORMAL 10*3/UL

## 2020-05-31 PROCEDURE — 36415 COLL VENOUS BLD VENIPUNCTURE: CPT

## 2020-05-31 PROCEDURE — 83036 HEMOGLOBIN GLYCOSYLATED A1C: CPT

## 2020-05-31 PROCEDURE — 84425 ASSAY OF VITAMIN B-1: CPT

## 2020-05-31 PROCEDURE — 83970 ASSAY OF PARATHORMONE: CPT

## 2020-05-31 PROCEDURE — 80053 COMPREHEN METABOLIC PANEL: CPT

## 2020-05-31 PROCEDURE — 84443 ASSAY THYROID STIM HORMONE: CPT

## 2020-05-31 PROCEDURE — 85025 COMPLETE CBC W/AUTO DIFF WBC: CPT

## 2020-05-31 PROCEDURE — 84590 ASSAY OF VITAMIN A: CPT

## 2020-05-31 PROCEDURE — 84439 ASSAY OF FREE THYROXINE: CPT

## 2020-05-31 PROCEDURE — 84630 ASSAY OF ZINC: CPT

## 2020-05-31 PROCEDURE — 83735 ASSAY OF MAGNESIUM: CPT

## 2020-05-31 PROCEDURE — 82306 VITAMIN D 25 HYDROXY: CPT

## 2020-05-31 PROCEDURE — 82607 VITAMIN B-12: CPT

## 2020-05-31 PROCEDURE — 83540 ASSAY OF IRON: CPT

## 2020-05-31 PROCEDURE — 82728 ASSAY OF FERRITIN: CPT

## 2020-05-31 PROCEDURE — 80061 LIPID PANEL: CPT

## 2020-05-31 PROCEDURE — 83550 IRON BINDING TEST: CPT

## 2020-05-31 PROCEDURE — 82746 ASSAY OF FOLIC ACID SERUM: CPT

## 2020-06-01 LAB
ESTIMATED AVERAGE GLUCOSE: 111 MG/DL
HBA1C MFR BLD: 5.5 % (ref 4–6)

## 2020-06-01 RX ORDER — ERGOCALCIFEROL 1.25 MG/1
50000 CAPSULE ORAL WEEKLY
Qty: 8 CAPSULE | Refills: 0 | Status: SHIPPED | OUTPATIENT
Start: 2020-06-01 | End: 2020-12-02 | Stop reason: ALTCHOICE

## 2020-06-03 ENCOUNTER — OFFICE VISIT (OUTPATIENT)
Dept: BARIATRICS/WEIGHT MGMT | Age: 50
End: 2020-06-03
Payer: COMMERCIAL

## 2020-06-03 VITALS
BODY MASS INDEX: 50.62 KG/M2 | DIASTOLIC BLOOD PRESSURE: 74 MMHG | SYSTOLIC BLOOD PRESSURE: 128 MMHG | RESPIRATION RATE: 20 BRPM | WEIGHT: 315 LBS | HEIGHT: 66 IN | HEART RATE: 70 BPM

## 2020-06-03 LAB
RETINYL PALMITATE: <0.02 MG/L (ref 0–0.1)
VITAMIN A LEVEL: 0.39 MG/L (ref 0.3–1.2)
VITAMIN A, INTERP: NORMAL
ZINC: 70.8 UG/DL (ref 60–120)

## 2020-06-03 PROCEDURE — 1036F TOBACCO NON-USER: CPT | Performed by: NURSE PRACTITIONER

## 2020-06-03 PROCEDURE — G8417 CALC BMI ABV UP PARAM F/U: HCPCS | Performed by: NURSE PRACTITIONER

## 2020-06-03 PROCEDURE — G8427 DOCREV CUR MEDS BY ELIG CLIN: HCPCS | Performed by: NURSE PRACTITIONER

## 2020-06-03 PROCEDURE — 3017F COLORECTAL CA SCREEN DOC REV: CPT | Performed by: NURSE PRACTITIONER

## 2020-06-03 PROCEDURE — 99213 OFFICE O/P EST LOW 20 MIN: CPT | Performed by: NURSE PRACTITIONER

## 2020-06-03 NOTE — PROGRESS NOTES
 Heart Disease Mother     High Blood Pressure Mother     Depression Mother     Sleep Apnea Mother     Other Mother         GERD    High Cholesterol Father     Diabetes Father     High Blood Pressure Father     Asthma Father     Sleep Apnea Father     Cancer Sister 48        unsure of name, lived 2 weeks after diagnosis       Social History:  Social History     Socioeconomic History    Marital status: Legally      Spouse name: Not on file    Number of children: 0    Years of education: Not on file    Highest education level: Not on file   Occupational History    Not on file   Social Needs    Financial resource strain: Not on file    Food insecurity     Worry: Not on file     Inability: Not on file   [employer] needs     Medical: Not on file     Non-medical: Not on file   Tobacco Use    Smoking status: Never Smoker    Smokeless tobacco: Never Used   Substance and Sexual Activity    Alcohol use: Not Currently     Alcohol/week: 0.0 standard drinks     Comment: rarely    Drug use: Never    Sexual activity: Not Currently     Partners: Female     Comment: girlfriend had tubal   Lifestyle    Physical activity     Days per week: Not on file     Minutes per session: Not on file    Stress: Not on file   Relationships    Social connections     Talks on phone: Not on file     Gets together: Not on file     Attends Orthodox service: Not on file     Active member of club or organization: Not on file     Attends meetings of clubs or organizations: Not on file     Relationship status: Not on file    Intimate partner violence     Fear of current or ex partner: Not on file     Emotionally abused: Not on file     Physically abused: Not on file     Forced sexual activity: Not on file   Other Topics Concern    Not on file   Social History Narrative    Not on file       Current Medications:  Current Outpatient Medications   Medication Sig Dispense Refill    vitamin D (ERGOCALCIFEROL) 1.25

## 2020-06-04 LAB — VITAMIN B1 WHOLE BLOOD: 121 NMOL/L (ref 70–180)

## 2020-08-28 ENCOUNTER — TELEPHONE (OUTPATIENT)
Dept: FAMILY MEDICINE CLINIC | Age: 50
End: 2020-08-28

## 2020-08-31 ENCOUNTER — OFFICE VISIT (OUTPATIENT)
Dept: FAMILY MEDICINE CLINIC | Age: 50
End: 2020-08-31
Payer: COMMERCIAL

## 2020-08-31 VITALS
OXYGEN SATURATION: 97 % | WEIGHT: 315 LBS | TEMPERATURE: 96.3 F | DIASTOLIC BLOOD PRESSURE: 76 MMHG | BODY MASS INDEX: 53.77 KG/M2 | RESPIRATION RATE: 16 BRPM | SYSTOLIC BLOOD PRESSURE: 110 MMHG | HEART RATE: 81 BPM

## 2020-08-31 PROBLEM — A04.8 HELICOBACTER PYLORI INFECTION: Status: ACTIVE | Noted: 2020-08-31

## 2020-08-31 PROCEDURE — G8417 CALC BMI ABV UP PARAM F/U: HCPCS | Performed by: NURSE PRACTITIONER

## 2020-08-31 PROCEDURE — 1036F TOBACCO NON-USER: CPT | Performed by: NURSE PRACTITIONER

## 2020-08-31 PROCEDURE — 3017F COLORECTAL CA SCREEN DOC REV: CPT | Performed by: NURSE PRACTITIONER

## 2020-08-31 PROCEDURE — 99213 OFFICE O/P EST LOW 20 MIN: CPT | Performed by: NURSE PRACTITIONER

## 2020-08-31 PROCEDURE — G8427 DOCREV CUR MEDS BY ELIG CLIN: HCPCS | Performed by: NURSE PRACTITIONER

## 2020-08-31 RX ORDER — METHYLPREDNISOLONE 4 MG/1
TABLET ORAL
Qty: 21 TABLET | Refills: 0 | Status: SHIPPED | OUTPATIENT
Start: 2020-08-31 | End: 2020-09-06

## 2020-08-31 RX ORDER — CYCLOBENZAPRINE HCL 10 MG
1 TABLET ORAL 3 TIMES DAILY PRN
COMMUNITY
Start: 2020-08-28 | End: 2020-08-31 | Stop reason: SDUPTHER

## 2020-08-31 RX ORDER — CYCLOBENZAPRINE HCL 10 MG
10 TABLET ORAL 2 TIMES DAILY PRN
Qty: 28 TABLET | Refills: 0 | Status: SHIPPED | OUTPATIENT
Start: 2020-08-31 | End: 2020-09-15 | Stop reason: SDUPTHER

## 2020-08-31 RX ORDER — HYDROCODONE BITARTRATE AND ACETAMINOPHEN 5; 325 MG/1; MG/1
TABLET ORAL
COMMUNITY
Start: 2020-08-28 | End: 2020-08-31 | Stop reason: DRUGHIGH

## 2020-08-31 RX ORDER — IBUPROFEN 800 MG/1
TABLET ORAL
COMMUNITY
Start: 2020-08-28 | End: 2020-09-18 | Stop reason: ALTCHOICE

## 2020-08-31 RX ORDER — ONDANSETRON 4 MG/1
TABLET, ORALLY DISINTEGRATING ORAL
COMMUNITY
Start: 2020-08-28 | End: 2020-08-31

## 2020-08-31 RX ORDER — HYDROCODONE BITARTRATE AND ACETAMINOPHEN 5; 325 MG/1; MG/1
1 TABLET ORAL EVERY 8 HOURS PRN
Qty: 28 TABLET | Refills: 0 | Status: SHIPPED | OUTPATIENT
Start: 2020-08-31 | End: 2020-09-15 | Stop reason: SDUPTHER

## 2020-08-31 ASSESSMENT — ENCOUNTER SYMPTOMS
NAUSEA: 0
ABDOMINAL PAIN: 0
CONSTIPATION: 0
SHORTNESS OF BREATH: 0
BOWEL INCONTINENCE: 0
BLOOD IN STOOL: 0
BACK PAIN: 1
DIARRHEA: 0
RECTAL PAIN: 0
CHEST TIGHTNESS: 0

## 2020-08-31 ASSESSMENT — PATIENT HEALTH QUESTIONNAIRE - PHQ9
SUM OF ALL RESPONSES TO PHQ9 QUESTIONS 1 & 2: 0
2. FEELING DOWN, DEPRESSED OR HOPELESS: 0
SUM OF ALL RESPONSES TO PHQ QUESTIONS 1-9: 0
SUM OF ALL RESPONSES TO PHQ QUESTIONS 1-9: 0
1. LITTLE INTEREST OR PLEASURE IN DOING THINGS: 0

## 2020-08-31 NOTE — PROGRESS NOTES
[address] [facility]   [address] [address]  [phone]    8/31/2020    Visit Information    Have you changed or started any medications since your last visit including any over-the-counter medicines, vitamins, or herbal medicines? yes - Med list updated   Are you having any side effects from any of your medications? -  no  Have you stopped taking any of your medications? Is so, why? -  yes - Updated    Have you seen any other physician or provider since your last visit? Yes - Records Obtained [facility] ED 8/28/20 Back Pain, Bariatrics Dr. Dustin Soriano  Have you had any other diagnostic tests since your last visit? Yes - Records Requested Lumbar XR, Gastric Sleeve Surgery 10/22/19  Have you been seen in the emergency room and/or had an admission to a hospital since we last saw you? Yes - Records Requested [facility] ED   Have you had your routine dental cleaning in the past 6 months? no    Have you activated your See[employer] account? If not, what are your barriers? Yes     Patient Care Team:  LAMONT Machado - CNP as PCP - General (Family Nurse Practitioner)      Peri Trevino is a 48 y.o. male who presents today for his  medical conditions/complaints as noted below. Peri Trevino is c/o of Back Pain (Onset thursday. Feels twinging. Worse with movement. Almost felt his knee buckle. [facility] ED Lumbar XR. )  . HPI:     Patient is a very pleasant 78-year-old  male. He presents the office today to follow-up regarding an emergency room visit due to back pain. Patient states last Friday morning he was on his way to work. He stepped out of his truck and felt a twinge and a sharp pain. Patient had immediate pain and no he be unable to work. Patient drove himself to the emergency room. At that time they sent him home with 800 ibuprofen, 10 mg of Flexeril, and Norco's. They also gave him a steroid injection. X-ray was completed at that time.   Patient does have multilevel arthritis and narrowing of his vertebrae. No acute findings. Patient is still having pain. It does not radiate to either of his extremities. He has no loss of bowel or bladder control. Patient states he is taking the Flexeril and Norco around-the-clock. Patient has not been back to work. We will order patient MRI of his lumbar spine today, decipher those results we will send him either physical therapy or referral to neuro surgery. I would give patient 2 more weeks of muscle relaxers and pain medications with him taking less often. Patient also prescribed steroid by mouth to help with inflammation. Patient encouraged to ice frequently. Patient is following with bariatric surgery. They are checking his lab values frequently. He had gastric bypass surgery in the fall 2019. Back Pain   This is a new problem. The current episode started in the past 7 days. The problem occurs constantly. The pain is present in the lumbar spine. The quality of the pain is described as stabbing, shooting, aching and burning. The pain does not radiate. The pain is moderate. The pain is the same all the time. The symptoms are aggravated by standing, twisting and position. Pertinent negatives include no abdominal pain, bladder incontinence, bowel incontinence, chest pain, dysuria, leg pain, numbness, paresthesias, pelvic pain, tingling or weakness. He has tried analgesics, muscle relaxant and NSAIDs for the symptoms. The treatment provided moderate relief.        Vitals:    08/31/20 0913   BP: 110/76   Site: Left Upper Arm   Position: Sitting   Cuff Size: Large Adult   Pulse: 81   Resp: 16   Temp: 96.3 °F (35.7 °C)   TempSrc: Temporal   SpO2: 97%   Weight: (!) 329 lb 3.2 oz (149.3 kg)      Past Medical History:   Diagnosis Date    Abnormal stress test 03/2019    Allergic rhinitis     Asthma     Moderate persistent asthma, unspecified whether complicated    Borderline diabetes mellitus     does not take meds regularly    History of cardiac cath 2019    History of Helicobacter pylori infection     has gone through 4 rounds of treatment, tested negative 10/2019    Hypertension     FP, NP. Malissa Rueda, will be seen on 9/9/2019 pror to spencer on 9/16/2019    Obesity     TASHA on CPAP     CPAP NIGHTLY, Dr. Maximiliano Villanueva. pylori test 06/2019    Post-nasal drip     Sprain, lumbar     Wears glasses       Past Surgical History:   Procedure Laterality Date   [address]  04/17/2019    Cardiologist, Dr. Yanna Paredes (no stents)    GASTRIC BYPASS SURGERY  10/22/2019    robotic laparascopic gastric sleeve    HEEL SPUR SURGERY Right 2009    ROTATOR CUFF REPAIR Left 2010    SLEEVE GASTRECTOMY N/A 10/22/2019    XI ROBOTIC LAPAROSCOPIC GASTRIC SLEEVE, EGD- GI UNIT SCHEDULED performed by Taylor Engle DO at [address] N/A 3/8/2019    EGD BIOPSY performed by Taylor Engle DO at [facility]     Family History   Problem Relation Age of Onset    Lung Cancer Maternal Grandfather     Broken Bones Paternal Grandmother         hip    Glaucoma Paternal Grandfather     Diabetes Mother     High Cholesterol Mother     Mental Illness Mother     Heart Disease Mother     High Blood Pressure Mother     Depression Mother     Sleep Apnea Mother     Other Mother         GERD    High Cholesterol Father     Diabetes Father     High Blood Pressure Father     Asthma Father     Sleep Apnea Father     Cancer Sister 48        unsure of name, lived 2 weeks after diagnosis     Social History     Tobacco Use    Smoking status: Never Smoker    Smokeless tobacco: Never Used   Substance Use Topics    Alcohol use: Not Currently     Alcohol/week: 0.0 standard drinks     Comment: rarely      Current Outpatient Medications   Medication Sig Dispense Refill    ibuprofen (ADVIL;MOTRIN) 800 MG tablet take 1 tablet by mouth every 8 hours for 10 days      cyclobenzaprine (FLEXERIL) 10 MG tablet Take 1 tablet by mouth 2 times daily as needed for Muscle spasms 28 tablet 0    HYDROcodone-acetaminophen (NORCO) 5-325 MG per tablet Take 1 tablet by mouth every 8 hours as needed for Pain for up to 14 days. Intended supply: 7 days. Take lowest dose possible to manage pain 28 tablet 0    methylPREDNISolone (MEDROL DOSEPACK) 4 MG tablet Take by mouth. 21 tablet 0    vitamin D (ERGOCALCIFEROL) 1.25 MG (98831 UT) CAPS capsule Take 1 capsule by mouth once a week for 8 doses 8 capsule 0    calcium carbonate (TUMS) 500 MG chewable tablet Take 1 tablet by mouth daily      Multiple Vitamins-Minerals (THERAPEUTIC MULTIVITAMIN-MINERALS) tablet Take 1 tablet by mouth daily Indications: celebrate      traZODone (DESYREL) 100 MG tablet Take 1 tablet by mouth nightly (Patient taking differently: Take 100 mg by mouth nightly as needed for Sleep ) 30 tablet 5    albuterol sulfate HFA (PROAIR HFA) 108 (90 Base) MCG/ACT inhaler Inhale 2 puffs into the lungs every 6 hours as needed for Wheezing or Shortness of Breath 8.5 g 0     No current facility-administered medications for this visit. Allergies   Allergen Reactions    Pcn [Penicillins] Anaphylaxis    Lidocaine      Pt unsure of reaction,  Happened as a child       Health Maintenance   Topic Date Due    Pneumococcal 0-64 years Vaccine (1 of 1 - PPSV23) 02/26/1976    DTaP/Tdap/Td vaccine (1 - Tdap) 02/26/1989    Shingles Vaccine (1 of 2) 02/26/2020    Colon cancer screen colonoscopy  02/26/2020    Flu vaccine (1) 09/01/2020    Lipid screen  05/31/2025    HIV screen  Completed    Hepatitis A vaccine  Aged Out    Hepatitis B vaccine  Aged Out    Hib vaccine  Aged Out    Meningococcal (ACWY) vaccine  Aged Out       Subjective:      Review of Systems   Constitutional: Positive for activity change. Negative for appetite change and fatigue. Respiratory: Negative for chest tightness and shortness of breath. Cardiovascular: Positive for leg swelling.  Negative for chest pain and palpitations. Gastrointestinal: Negative for abdominal pain, blood in stool, bowel incontinence, constipation, diarrhea, nausea and rectal pain. Genitourinary: Negative for bladder incontinence, difficulty urinating, dysuria, flank pain, frequency and pelvic pain. Musculoskeletal: Positive for arthralgias, back pain and myalgias. Lumbar back   Neurological: Negative for tingling, weakness, numbness and paresthesias. Psychiatric/Behavioral: Positive for sleep disturbance. Patient does utilize CPAP as well as trazodone for sleeping          Objective:     Physical Exam  Vitals signs and nursing note reviewed. Constitutional:       General: He is not in acute distress. Appearance: Normal appearance. He is well-developed and well-groomed. He is morbidly obese. He is not ill-appearing or toxic-appearing. Cardiovascular:      Rate and Rhythm: Normal rate and regular rhythm. No extrasystoles are present. Pulses: Normal pulses. Heart sounds: S1 normal. Heart sounds are distant. No murmur. Pulmonary:      Effort: Pulmonary effort is normal. No prolonged expiration or respiratory distress. Breath sounds: Normal breath sounds and air entry. Abdominal:      General: Abdomen is protuberant. Musculoskeletal:      Lumbar back: He exhibits decreased range of motion, tenderness, pain and spasm. He exhibits no swelling and no edema. Right lower leg: No edema. Left lower leg: No edema. Comments: Limited active and passive range of motion   Skin:     General: Skin is warm and dry. Coloration: Skin is not pale. Neurological:      Mental Status: He is alert and oriented to person, place, and time. Motor: Motor function is intact. No tremor. Gait: Gait is intact.    Psychiatric:         Attention and Perception: Attention and perception normal.         Mood and Affect: Mood and affect normal.         Speech: Speech normal.         Behavior: Behavior is cooperative. Thought Content: Thought content normal.         Cognition and Memory: Cognition and memory normal.         Judgment: Judgment normal.          Assessment:      1. Acute midline low back pain without sciatica  - cyclobenzaprine (FLEXERIL) 10 MG tablet; Take 1 tablet by mouth 2 times daily as needed for Muscle spasms  Dispense: 28 tablet; Refill: 0  - HYDROcodone-acetaminophen (NORCO) 5-325 MG per tablet; Take 1 tablet by mouth every 8 hours as needed for Pain for up to 14 days. Intended supply: 7 days. Take lowest dose possible to manage pain  Dispense: 28 tablet; Refill: 0  - methylPREDNISolone (MEDROL DOSEPACK) 4 MG tablet; Take by mouth. Dispense: 21 tablet; Refill: 0  - MRI LUMBAR SPINE WO CONTRAST; Future       Plan:      Return in 2 weeks (on 9/14/2020) for Call if systems do not improve or get worse. Orders Placed This Encounter   Procedures    MRI LUMBAR SPINE WO CONTRAST     Standing Status:   Future     Standing Expiration Date:   9/30/2020     Order Specific Question:   Reason for exam:     Answer:   twisted getting out of vehicle Friday am, sharp pain     Orders Placed This Encounter   Medications    cyclobenzaprine (FLEXERIL) 10 MG tablet     Sig: Take 1 tablet by mouth 2 times daily as needed for Muscle spasms     Dispense:  28 tablet     Refill:  0    HYDROcodone-acetaminophen (NORCO) 5-325 MG per tablet     Sig: Take 1 tablet by mouth every 8 hours as needed for Pain for up to 14 days. Intended supply: 7 days. Take lowest dose possible to manage pain     Dispense:  28 tablet     Refill:  0     Reduce doses taken as pain becomes manageable    methylPREDNISolone (MEDROL DOSEPACK) 4 MG tablet     Sig: Take by mouth. Dispense:  21 tablet     Refill:  0       Reviewed health maintenance, prior labs and imaging. Patient given educational materials - see patient instructions. Discussed use, benefit, and side effects of prescribed medications.  Barriers to medication compliance addressed. All patient questions answered. Pt voiced understanding to plan of care. Instructed to continue medications as discussed, healthy diet and exercise. Patient agreed with treatment plan. Follow up as directed below. Communication:      Items for Patient/Writer/Staff to Tara Danielson  She is to complete MRI of lumbar spine as soon as possible. Will decipher per results if patient needs physical therapy and/or neurosurgery referral.  Patient encouraged to limit use of controlled substance, to use of Flexeril with steroids and to ice as frequently as possible. Patient works at [employer]. I will fill out leave paperwork for him starting last Friday when injury occurred.   End date will be at his follow-up appointment on September 18    Quality Measures  BMI Readings from Last 1 Encounters:   08/31/20 53.77 kg/m²        Lab Results   Component Value Date    LABA1C 5.5 05/31/2020       BP Readings from Last 3 Encounters:   08/31/20 110/76   06/03/20 128/74   01/29/20 130/80        The 10-year ASCVD risk score (Yassine Avalos, et al., 2013) is: 2%    Values used to calculate the score:      Age: 48 years      Sex: Male      Is Non- : No      Diabetic: No      Tobacco smoker: No      Systolic Blood Pressure: 764 mmHg      Is BP treated: No      HDL Cholesterol: 39 mg/dL      Total Cholesterol: 135 mg/dL     PHQ Scores 8/31/2020 7/10/2019 7/23/2018 4/24/2017   PHQ2 Score 0 2 0 0   PHQ9 Score 0 2 0 0     Interpretation of Total Score Depression Severity: 1-4 = Minimal depression, 5-9 = Mild depression, 10-14 = Moderate depression, 15-19 = Moderately severe depression, 20-27 = Severe depression     Electronically signed by ESMER Clemens on 8/31/2020 at 9:47 AM

## 2020-08-31 NOTE — PATIENT INSTRUCTIONS
Patient Education        Learning About Relief for Back Pain  What is back strain? Back strain is an injury that happens when you overstretch, or pull, a muscle in your back. You may hurt your back in an accident or when you exercise or lift something. Most back pain gets better with rest and time. You can take care of yourself at home to help your back heal.  What can you do first to relieve back pain? When you first feel back pain, try these steps:  · Walk. Take a short walk (10 to 20 minutes) on a level surface (no slopes, hills, or stairs) every 2 to 3 hours. Walk only distances you can manage without pain, especially leg pain. · Relax. Find a comfortable position for rest. Some people are comfortable on the floor or a medium-firm bed with a small pillow under their head and another under their knees. Some people prefer to lie on their side with a pillow between their knees. Don't stay in one position for too long. · Try heat or ice. Try using a heating pad on a low or medium setting, or take a warm shower, for 15 to 20 minutes every 2 to 3 hours. Or you can buy single-use heat wraps that last up to 8 hours. You can also try an ice pack for 10 to 15 minutes every 2 to 3 hours. You can use an ice pack or a bag of frozen vegetables wrapped in a thin towel. There is not strong evidence that either heat or ice will help, but you can try them to see if they help. You may also want to try switching between heat and cold. · Take pain medicine exactly as directed. ? If the doctor gave you a prescription medicine for pain, take it as prescribed. ? If you are not taking a prescription pain medicine, ask your doctor if you can take an over-the-counter medicine. What else can you do? · Stretch and exercise. Exercises that increase flexibility may relieve your pain and make it easier for your muscles to keep your spine in a good, neutral position. And don't forget to keep walking. · Do self-massage.  You can use self-massage to unwind after work or school or to energize yourself in the morning. You can easily massage your feet, hands, or neck. Self-massage works best if you are in comfortable clothes and are sitting or lying in a comfortable position. Use oil or lotion to massage bare skin. · Reduce stress. Back pain can lead to a vicious [place]: Distress about the pain tenses the muscles in your back, which in turn causes more pain. Learn how to relax your mind and your muscles to lower your stress. Where can you learn more? Go to https://StormMQpeBooksmart Technologieseb.Mindbloom. org and sign in to your [employer] account. Enter U467 in the [employer] box to learn more about \"Learning About Relief for Back Pain. \"     If you do not have an account, please click on the \"Sign Up Now\" link. Current as of: March 2, 2020               Content Version: 12.5  © 2006-2020 [employer]. Care instructions adapted under license by [facility] ([facility]). If you have questions about a medical condition or this instruction, always ask your healthcare professional. [place] [address] any warranty or liability for your use of this information. Patient Education        Acute Low Back Pain: Exercises  Introduction  Here are some examples of typical rehabilitation exercises for your condition. Start each exercise slowly. Ease off the exercise if you start to have pain. Your doctor or physical therapist will tell you when you can start these exercises and which ones will work best for you. When you are not being active, find a comfortable position for rest. Some people are comfortable on the floor or a medium-firm bed with a small pillow under their head and another under their knees. Some people prefer to lie on their side with a pillow between their knees. Don't stay in one position for too long. Take short walks (10 to 20 minutes) every 2 to 3 hours.  Avoid slopes, hills, and stairs until you feel better. Walk only distances you can manage without pain, especially leg pain. How to do the exercises  Back stretches   1. Get down on your hands and knees on the floor. 2. Relax your head and allow it to droop. Round your back up toward the ceiling until you feel a nice stretch in your upper, middle, and lower back. Hold this stretch for as long as it feels comfortable, or about 15 to 30 seconds. 3. Return to the starting position with a flat back while you are on your hands and knees. 4. Let your back sway by pressing your stomach toward the floor. Lift your buttocks toward the ceiling. 5. Hold this position for 15 to 30 seconds. 6. Repeat 2 to 4 times. Follow-up care is a key part of your treatment and safety. Be sure to make and go to all appointments, and call your doctor if you are having problems. It's also a good idea to know your test results and keep a list of the medicines you take. Where can you learn more? Go to https://FreedomPop.Genwords. org and sign in to your [employer] account. Enter D398 in the [employer] box to learn more about \"Acute Low Back Pain: Exercises. \"     If you do not have an account, please click on the \"Sign Up Now\" link. Current as of: March 2, 2020               Content Version: 12.5  © 1631-5621 [employer]. Care instructions adapted under license by [facility] [place] ([facility]). If you have questions about a medical condition or this instruction, always ask your healthcare professional. [place] [address] any warranty or liability for your use of this information.

## 2020-09-02 ENCOUNTER — OFFICE VISIT (OUTPATIENT)
Dept: BARIATRICS/WEIGHT MGMT | Age: 50
End: 2020-09-02
Payer: COMMERCIAL

## 2020-09-02 VITALS
TEMPERATURE: 96.6 F | WEIGHT: 315 LBS | SYSTOLIC BLOOD PRESSURE: 128 MMHG | DIASTOLIC BLOOD PRESSURE: 76 MMHG | HEIGHT: 65 IN | BODY MASS INDEX: 52.48 KG/M2 | RESPIRATION RATE: 20 BRPM | HEART RATE: 84 BPM

## 2020-09-02 PROCEDURE — 99213 OFFICE O/P EST LOW 20 MIN: CPT | Performed by: NURSE PRACTITIONER

## 2020-09-02 PROCEDURE — 3017F COLORECTAL CA SCREEN DOC REV: CPT | Performed by: NURSE PRACTITIONER

## 2020-09-02 PROCEDURE — 1036F TOBACCO NON-USER: CPT | Performed by: NURSE PRACTITIONER

## 2020-09-02 PROCEDURE — G8427 DOCREV CUR MEDS BY ELIG CLIN: HCPCS | Performed by: NURSE PRACTITIONER

## 2020-09-02 PROCEDURE — G8417 CALC BMI ABV UP PARAM F/U: HCPCS | Performed by: NURSE PRACTITIONER

## 2020-09-02 NOTE — PROGRESS NOTES
Post-op Bariatric Surgery Note    Subjective     Patient is 10 months s/p laparoscopic sleeve gastrectomy, down 111 lbs. Overall, doing well. Incisions well healed. Consistent use of MVI and calcium. Physical activity includes walking. No pain or other specific problems or concerns. Allergies: Allergies   Allergen Reactions    Pcn [Penicillins] Anaphylaxis    Lidocaine      Pt unsure of reaction,  Happened as a child       Past Medical History:     Past Medical History:   Diagnosis Date    Abnormal stress test 03/2019    Allergic rhinitis     Asthma     Moderate persistent asthma, unspecified whether complicated    Borderline diabetes mellitus     does not take meds regularly    History of cardiac cath 2019    History of Helicobacter pylori infection     has gone through 4 rounds of treatment, tested negative 10/2019    Hypertension     FP, NP. Delberta Babinski, will be seen on 9/9/2019 pror to spencer on 9/16/2019    Obesity     TASHA on CPAP     CPAP NIGHTLY, Dr. Tomy Cleaning    Positive H. pylori test 06/2019    Post-nasal drip     Sprain, lumbar     Wears glasses    .     Past Surgical History:  Past Surgical History:   Procedure Laterality Date    CARDIAC CATHETERIZATION  04/17/2019    Cardiologist, Dr. Zoran Toro (no stents)    GASTRIC BYPASS SURGERY  10/22/2019    robotic laparascopic gastric sleeve    HEEL SPUR SURGERY Right 2009    ROTATOR CUFF REPAIR Left 2010    SLEEVE GASTRECTOMY N/A 10/22/2019    XI ROBOTIC LAPAROSCOPIC GASTRIC SLEEVE, EGD- GI UNIT SCHEDULED performed by Monica Schrader DO at [address] N/A 3/8/2019    EGD BIOPSY performed by Monica Schrader DO at [facility]       Family History:  Family History   Problem Relation Age of Onset    Lung Cancer Maternal Grandfather     Broken Bones Paternal Grandmother         hip    Glaucoma Paternal Grandfather     Diabetes Mother     High Cholesterol Mother     Mental Illness 800 MG tablet take 1 tablet by mouth every 8 hours for 10 days      cyclobenzaprine (FLEXERIL) 10 MG tablet Take 1 tablet by mouth 2 times daily as needed for Muscle spasms 28 tablet 0    HYDROcodone-acetaminophen (NORCO) 5-325 MG per tablet Take 1 tablet by mouth every 8 hours as needed for Pain for up to 14 days. Intended supply: 7 days. Take lowest dose possible to manage pain 28 tablet 0    methylPREDNISolone (MEDROL DOSEPACK) 4 MG tablet Take by mouth. 21 tablet 0    calcium carbonate (TUMS) 500 MG chewable tablet Take 1 tablet by mouth daily      Multiple Vitamins-Minerals (THERAPEUTIC MULTIVITAMIN-MINERALS) tablet Take 1 tablet by mouth daily Indications: celebrate      traZODone (DESYREL) 100 MG tablet Take 1 tablet by mouth nightly (Patient taking differently: Take 100 mg by mouth nightly as needed for Sleep ) 30 tablet 5    albuterol sulfate HFA (PROAIR HFA) 108 (90 Base) MCG/ACT inhaler Inhale 2 puffs into the lungs every 6 hours as needed for Wheezing or Shortness of Breath 8.5 g 0    vitamin D (ERGOCALCIFEROL) 1.25 MG (50652 UT) CAPS capsule Take 1 capsule by mouth once a week for 8 doses (Patient not taking: Reported on 9/2/2020) 8 capsule 0     No current facility-administered medications for this visit. Vital Signs:  /76 (Site: Right Upper Arm, Position: Sitting, Cuff Size: Large Adult)   Pulse 84   Temp 96.6 °F (35.9 °C) (Infrared)   Resp 20   Ht 5' 5\" (1.651 m)   Wt (!) 324 lb (147 kg)   BMI 53.92 kg/m²     BMI/Height/Weight:  Body mass index is 53.92 kg/m². Review of Systems - A review of systems was performed. All was negative unless otherwise documented in HPI. Constitutional: Negative for fever, chills and diaphoresis. HENT: Negative for hearing loss and trouble swallowing. Eyes: Negative for photophobia and visual disturbance. Respiratory: Negative for cough, shortness of breath and wheezing. Cardiovascular: Negative for chest pain and palpitations. Morbid obesity (HCC)  Comprehensive Metabolic Panel    TSH without Reflex    T4, Free    Hemoglobin A1C    Vitamin B12 & Folate    Vitamin B1    Vitamin D 25 Hydroxy    Magnesium    Iron and TIBC    Vitamin A    Lipid Panel    PTH, Intact    CBC Auto Differential    Zinc    Ferritin   4. S/P laparoscopic sleeve gastrectomy  Comprehensive Metabolic Panel    TSH without Reflex    T4, Free    Hemoglobin A1C    Vitamin B12 & Folate    Vitamin B1    Vitamin D 25 Hydroxy    Magnesium    Iron and TIBC    Vitamin A    Lipid Panel    PTH, Intact    CBC Auto Differential    Zinc    Ferritin       Plan:    No dietitian visit today. Patient to continue to increase protein to obtain 60-80g/day, at least 48-64oz of fluid daily, and gradually increase exercise regimen. Follow-up  Return in about 3 months (around 12/2/2020). Orders this encounter:  Orders Placed This Encounter   Procedures    Comprehensive Metabolic Panel     Standing Status:   Future     Standing Expiration Date:   9/2/2021    TSH without Reflex     Standing Status:   Future     Standing Expiration Date:   9/2/2021    T4, Free     Standing Status:   Future     Standing Expiration Date:   9/2/2021    Hemoglobin A1C     Standing Status:   Future     Standing Expiration Date:   9/2/2021    Vitamin B12 & Folate     Standing Status:   Future     Standing Expiration Date:   9/2/2021    Vitamin B1     Standing Status:   Future     Standing Expiration Date:   9/2/2021    Vitamin D 25 Hydroxy     Standing Status:   Future     Standing Expiration Date:   9/2/2021    Magnesium     Standing Status:   Future     Standing Expiration Date:   9/2/2021    Iron and TIBC     Standing Status:   Future     Standing Expiration Date:   9/2/2021     Order Specific Question:   Is Patient Fasting? Answer:   Yes     Order Specific Question:   No of Hours?      Answer:   12 hours    Vitamin A     Standing Status:   Future     Standing Expiration Date:   9/2/2021   Joseph

## 2020-09-04 ENCOUNTER — HOSPITAL ENCOUNTER (OUTPATIENT)
Dept: MRI IMAGING | Facility: CLINIC | Age: 50
Discharge: HOME | End: 2020-09-06
Payer: COMMERCIAL

## 2020-09-04 PROCEDURE — 72148 MRI LUMBAR SPINE W/O DYE: CPT

## 2020-09-08 ENCOUNTER — TELEPHONE (OUTPATIENT)
Dept: FAMILY MEDICINE CLINIC | Age: 50
End: 2020-09-08

## 2020-09-08 NOTE — TELEPHONE ENCOUNTER
Patient stated that he is unable to get ahold of the clinical staff for assistance with information that was faxed over.       Please give a call [phone]

## 2020-09-09 NOTE — TELEPHONE ENCOUNTER
Patient is wondering if you want him off work through his upcoming appointment 09/18/2020. Please advise.

## 2020-09-09 NOTE — TELEPHONE ENCOUNTER
If patient is still experiencing pain then we can write for him off work until he has neurosurgeon consult. I personally sent myself just now a referral to Kush Lyons within [facility] neurosurgery.

## 2020-09-15 RX ORDER — CYCLOBENZAPRINE HCL 10 MG
10 TABLET ORAL 2 TIMES DAILY PRN
Qty: 28 TABLET | Refills: 0 | Status: SHIPPED | OUTPATIENT
Start: 2020-09-15 | End: 2021-04-27 | Stop reason: SDUPTHER

## 2020-09-15 RX ORDER — HYDROCODONE BITARTRATE AND ACETAMINOPHEN 5; 325 MG/1; MG/1
1 TABLET ORAL EVERY 8 HOURS PRN
Qty: 28 TABLET | Refills: 0 | Status: SHIPPED | OUTPATIENT
Start: 2020-09-15 | End: 2020-09-29

## 2020-09-18 ENCOUNTER — OFFICE VISIT (OUTPATIENT)
Dept: FAMILY MEDICINE CLINIC | Age: 50
End: 2020-09-18
Payer: COMMERCIAL

## 2020-09-18 VITALS
TEMPERATURE: 99 F | HEART RATE: 93 BPM | OXYGEN SATURATION: 98 % | SYSTOLIC BLOOD PRESSURE: 110 MMHG | DIASTOLIC BLOOD PRESSURE: 80 MMHG | BODY MASS INDEX: 53.98 KG/M2 | RESPIRATION RATE: 18 BRPM | WEIGHT: 315 LBS

## 2020-09-18 PROCEDURE — G8417 CALC BMI ABV UP PARAM F/U: HCPCS | Performed by: NURSE PRACTITIONER

## 2020-09-18 PROCEDURE — 3017F COLORECTAL CA SCREEN DOC REV: CPT | Performed by: NURSE PRACTITIONER

## 2020-09-18 PROCEDURE — G8427 DOCREV CUR MEDS BY ELIG CLIN: HCPCS | Performed by: NURSE PRACTITIONER

## 2020-09-18 PROCEDURE — 1036F TOBACCO NON-USER: CPT | Performed by: NURSE PRACTITIONER

## 2020-09-18 PROCEDURE — 99214 OFFICE O/P EST MOD 30 MIN: CPT | Performed by: NURSE PRACTITIONER

## 2020-09-18 NOTE — PROGRESS NOTES
[address] [facility]   [address] [address]  [phone]    9/20/2020    Visit Information    Have you changed or started any medications since your last visit including any over-the-counter medicines, vitamins, or herbal medicines? yes - Med list updated   Are you having any side effects from any of your medications? -  no  Have you stopped taking any of your medications? Is so, why? -  yes - Med list updated    Have you seen any other physician or provider since your last visit? Yes - Records Obtained Bariatrics  Have you had any other diagnostic tests since your last visit? Yes - Records Obtained MRI Lumbar Spine  Have you been seen in the emergency room and/or had an admission to a hospital since we last saw you? No  Have you had your routine dental cleaning in the past 6 months? no    Have you activated your [employer] account? If not, what are your barriers? Yes     Patient Care Team:  LAMONT Ferguson CNP as PCP - General (Nurse Practitioner Family)  [facility] Ferguson [facility]      Anali Chen is a 48 y.o. male who presents today for his  medical conditions/complaints as noted below. Anali Chen is c/o of Back Pain (Recheck) and Forms  . HPI:     Patient is a very pleasant 25-year-old  male. He presents for office today to follow-up regarding his recent back pain. Patient was taken off to work due to such significant acute pain. Patient symptoms have completely resolved due to him being able to rest.  Patient did have an MRI that showed some abnormalities. I did refer him to neurosurgery, however due to patient's pain relief he is not going for evaluation. Patient states the last pain medication he took was on Tuesday, and it was only prior to bed. Care team includes:  Pulmonary, Dr. Monique Yee. Patient states he saw him approximately 1 year ago when he prescribed him his CPAP machine.   Patient does go on to state that he feels as though his CPAP settings are not correct as he is not feeling rested and waking often throughout the night with utilization of machine. Patient wonders if he needs more of a facemask as opposed to nose. Patient was encouraged to follow-up with a pulmonologist as he is monitoring any CPAP titration.         Vitals:    09/18/20 0943   BP: 110/80   Site: Right Upper Arm   Position: Sitting   Cuff Size: Large Adult   Pulse: 93   Resp: 18   Temp: 99 °F (37.2 °C)   TempSrc: Tympanic   SpO2: 98%   Weight: (!) 324 lb 6.4 oz (147.1 kg)      Past Medical History:   Diagnosis Date    Abnormal stress test 03/2019    Allergic rhinitis     Asthma     Moderate persistent asthma, unspecified whether complicated    Borderline diabetes mellitus     does not take meds regularly    History of cardiac cath 2019    History of Helicobacter pylori infection     has gone through 4 rounds of treatment, tested negative 10/2019    Hypertension     FP, NP. Gloria Subramanian, will be seen on 9/9/2019 pror to spencer on 9/16/2019    Obesity     TASHA on CPAP     CPAP NIGHTLY, Dr. Archie Sosa    Positive H. pylori test 06/2019    Post-nasal drip     Sprain, lumbar     Wears glasses       Past Surgical History:   Procedure Laterality Date    CARDIAC CATHETERIZATION  04/17/2019    Cardiologist, Dr. Sandra Wei (no stents)    GASTRIC BYPASS SURGERY  10/22/2019    robotic laparascopic gastric sleeve    HEEL SPUR SURGERY Right 2009    ROTATOR CUFF REPAIR Left 2010    SLEEVE GASTRECTOMY N/A 10/22/2019    XI ROBOTIC LAPAROSCOPIC GASTRIC SLEEVE, EGD- GI UNIT SCHEDULED performed by Nakia Saldivar DO at [address] N/A 3/8/2019    EGD BIOPSY performed by Nakia Saldivar DO at [facility]     Family History   Problem Relation Age of Onset    Lung Cancer Maternal Grandfather     Broken Bones Paternal Grandmother         hip    Glaucoma Paternal Grandfather     Diabetes Mother     High Cholesterol Mother     Mental Illness Mother     Heart Disease Mother     High Blood Pressure Mother     Depression Mother     Sleep Apnea Mother     Other Mother         GERD    High Cholesterol Father     Diabetes Father     High Blood Pressure Father     Asthma Father     Sleep Apnea Father    Bo [place] Cancer Sister 48        unsure of name, lived 2 weeks after diagnosis     Social History     Tobacco Use    Smoking status: Never Smoker    Smokeless tobacco: Never Used   Substance Use Topics    Alcohol use: Not Currently     Alcohol/week: 0.0 standard drinks     Comment: rarely      Current Outpatient Medications   Medication Sig Dispense Refill    cyclobenzaprine (FLEXERIL) 10 MG tablet Take 1 tablet by mouth 2 times daily as needed for Muscle spasms 28 tablet 0    HYDROcodone-acetaminophen (NORCO) 5-325 MG per tablet Take 1 tablet by mouth every 8 hours as needed for Pain for up to 14 days. Intended supply: 7 days. Take lowest dose possible to manage pain 28 tablet 0    calcium carbonate (TUMS) 500 MG chewable tablet Take 1 tablet by mouth daily      Multiple Vitamins-Minerals (THERAPEUTIC MULTIVITAMIN-MINERALS) tablet Take 1 tablet by mouth daily Indications: celebrate      traZODone (DESYREL) 100 MG tablet Take 1 tablet by mouth nightly (Patient taking differently: Take 100 mg by mouth nightly as needed for Sleep ) 30 tablet 5    albuterol sulfate HFA (PROAIR HFA) 108 (90 Base) MCG/ACT inhaler Inhale 2 puffs into the lungs every 6 hours as needed for Wheezing or Shortness of Breath 8.5 g 0    vitamin D (ERGOCALCIFEROL) 1.25 MG (17438 UT) CAPS capsule Take 1 capsule by mouth once a week for 8 doses (Patient not taking: Reported on 9/18/2020) 8 capsule 0     No current facility-administered medications for this visit.       Allergies   Allergen Reactions    Pcn [Penicillins] Anaphylaxis    Lidocaine      Pt unsure of reaction,  Happened as a child       Health Maintenance   Topic Date Due    Pneumococcal 0-64 years Vaccine (1 of 1 - PPSV23) 02/26/1976    DTaP/Tdap/Td vaccine (1 - Tdap) 02/26/1989    Shingles Vaccine (1 of 2) 02/26/2020    Colon cancer screen colonoscopy  02/26/2020    Flu vaccine (1) 09/01/2020    Lipid screen  05/31/2025    HIV screen  Completed    Hepatitis A vaccine  Aged Out    Hepatitis B vaccine  Aged Out    Hib vaccine  Aged Out    Meningococcal (ACWY) vaccine  Aged Out       Subjective:      Review of Systems   Constitutional: Negative for activity change, appetite change, chills, fatigue and fever. Respiratory: Negative for cough, chest tightness, shortness of breath and wheezing. Cardiovascular: Negative for chest pain, palpitations and leg swelling. Musculoskeletal: Negative for arthralgias, back pain, gait problem and joint swelling. Psychiatric/Behavioral: Negative for agitation, sleep disturbance and suicidal ideas. The patient is not nervous/anxious. Objective:     Physical Exam  Vitals signs and nursing note reviewed. Constitutional:       General: He is not in acute distress. Appearance: Normal appearance. He is well-developed and well-groomed. He is morbidly obese. He is not ill-appearing or toxic-appearing. Cardiovascular:      Rate and Rhythm: Normal rate and regular rhythm. No extrasystoles are present. Pulses: Normal pulses. Heart sounds: S1 normal and S2 normal. Heart sounds are distant. No murmur. Pulmonary:      Effort: Pulmonary effort is normal. No prolonged expiration or respiratory distress. Breath sounds: Normal breath sounds and air entry. Musculoskeletal: Normal range of motion. Lumbar back: He exhibits normal range of motion, no tenderness, no swelling, no edema, no pain and no spasm. Right lower leg: No edema. Left lower leg: No edema. Comments: Full active and passive range of motion   Skin:     General: Skin is warm and dry. Coloration: Skin is not pale.    Neurological: Mental Status: He is alert and oriented to person, place, and time. Sensory: Sensation is intact. Motor: Motor function is intact. Coordination: Coordination is intact. Gait: Gait is intact. Psychiatric:         Attention and Perception: Attention and perception normal.         Mood and Affect: Mood and affect normal.         Speech: Speech normal.         Behavior: Behavior normal. Behavior is cooperative. Thought Content: Thought content normal.         Cognition and Memory: Cognition and memory normal.         Judgment: Judgment normal.          Assessment:      1. Acute midline low back pain without sciatica  - External Referral To Physical Therapy  Physical therapy ordered for patient due to abnormal MRI. He is denying need for neuro surgery evaluation. 2. Uncomplicated asthma, unspecified asthma severity, unspecified whether persistent  -Stable, medicated, monitered   Patient is to continue to follow with pulmonology    3. Obstructive sleep apnea syndrome  -Stable, medicated, monitered  Continue CPAP nightly. Encouraged to make a follow-up appointment to ensure proper CPAP settings    4. Indigestion  -Stable, monitered     5. Morbid obesity ([facility] [address]  - External Referral To Physical Therapy    6. Vitamin D deficiency  -Stable, medicated, monitered     7. Low iron  -Stable, monitered        Plan:      Return for Call if systems do not improve or get worse. Orders Placed This Encounter   Procedures    External Referral To Physical Therapy     Referral Priority:   Routine     Referral Type:   Eval and Treat     Referral Reason:   Specialty Services Required     Referred to Provider:   Shilpa Brice Specialty:   Physical Therapy     Number of Visits Requested:   1     No orders of the defined types were placed in this encounter. Reviewed health maintenance, prior labs and imaging. Patient given educational materials - see patient instructions. Discussed use, benefit, and side effects of prescribed medications. Barriers to medication compliance addressed. All patient questions answered. Pt voiced understanding to plan of care. Instructed to continue medications as discussed, healthy diet and exercise. Patient agreed with treatment plan. Follow up as directed below. Communication:      Items for Patient/Writer/Staff to Accomplish  Physical therapy referral complete. Patient's FMLA paperwork completed and given to patient as well as faxed  Patient may return to work on Monday, September 21, 2020.     Quality Measures  BMI Readings from Last 1 Encounters:   09/18/20 53.98 kg/m²        Lab Results   Component Value Date    LABA1C 5.5 05/31/2020       BP Readings from Last 3 Encounters:   09/18/20 110/80   09/02/20 128/76   08/31/20 110/76        The 10-year ASCVD risk score (William Hugo, et al., 2013) is: 2%    Values used to calculate the score:      Age: 48 years      Sex: Male      Is Non- : No      Diabetic: No      Tobacco smoker: No      Systolic Blood Pressure: 437 mmHg      Is BP treated: No      HDL Cholesterol: 39 mg/dL      Total Cholesterol: 135 mg/dL     PHQ Scores 8/31/2020 7/10/2019 7/23/2018 4/24/2017   PHQ2 Score 0 2 0 0   PHQ9 Score 0 2 0 0     Interpretation of Total Score Depression Severity: 1-4 = Minimal depression, 5-9 = Mild depression, 10-14 = Moderate depression, 15-19 = Moderately severe depression, 20-27 = Severe depression     Electronically signed by ESMER Kaminski on 9/20/2020 at 8:01 PM

## 2020-09-18 NOTE — LETTER
[facility]  [address] [address]  Phone: [phone]  Fax: [phone]    LAMONT Bonilla - SARAH BETH        September 18, 2020     Patient: Castillo Horowitz   YOB: 1970   Date of Visit: 9/18/2020       To Whom it May Concern:    Castillo Horowitz was seen in my clinic on 9/18/2020. He may return to work on Monday 9/21/2020. No need for neurosurgical referral, pain resolved with rest. PT ordered. If you have any questions or concerns, please don't hesitate to call.     Sincerely,           LAMONT Phelps - CNP

## 2020-09-20 ASSESSMENT — ENCOUNTER SYMPTOMS
BACK PAIN: 0
SHORTNESS OF BREATH: 0
COUGH: 0
CHEST TIGHTNESS: 0
WHEEZING: 0

## 2020-10-16 ENCOUNTER — TELEPHONE (OUTPATIENT)
Dept: FAMILY MEDICINE CLINIC | Age: 50
End: 2020-10-16

## 2020-10-16 NOTE — TELEPHONE ENCOUNTER
Pt is requesting office to call him back. Pt wouldn't give me a reason why after I repeatedly asked what the issue was he only got more upset and said he needed to speak to someone in the office.  His call back number is [phone]

## 2020-11-02 ENCOUNTER — OFFICE VISIT (OUTPATIENT)
Dept: PULMONOLOGY | Age: 50
End: 2020-11-02
Payer: COMMERCIAL

## 2020-11-02 VITALS
BODY MASS INDEX: 50.62 KG/M2 | WEIGHT: 315 LBS | HEIGHT: 66 IN | HEART RATE: 76 BPM | DIASTOLIC BLOOD PRESSURE: 82 MMHG | TEMPERATURE: 97.4 F | SYSTOLIC BLOOD PRESSURE: 122 MMHG | OXYGEN SATURATION: 100 % | RESPIRATION RATE: 12 BRPM

## 2020-11-02 PROCEDURE — 1036F TOBACCO NON-USER: CPT | Performed by: INTERNAL MEDICINE

## 2020-11-02 PROCEDURE — G8484 FLU IMMUNIZE NO ADMIN: HCPCS | Performed by: INTERNAL MEDICINE

## 2020-11-02 PROCEDURE — G8427 DOCREV CUR MEDS BY ELIG CLIN: HCPCS | Performed by: INTERNAL MEDICINE

## 2020-11-02 PROCEDURE — G8417 CALC BMI ABV UP PARAM F/U: HCPCS | Performed by: INTERNAL MEDICINE

## 2020-11-02 PROCEDURE — 99214 OFFICE O/P EST MOD 30 MIN: CPT | Performed by: INTERNAL MEDICINE

## 2020-11-02 PROCEDURE — 3017F COLORECTAL CA SCREEN DOC REV: CPT | Performed by: INTERNAL MEDICINE

## 2020-11-02 ASSESSMENT — SLEEP AND FATIGUE QUESTIONNAIRES
HOW LIKELY ARE YOU TO NOD OFF OR FALL ASLEEP IN A CAR, WHILE STOPPED FOR A FEW MINUTES IN TRAFFIC: 0
ESS TOTAL SCORE: 4
HOW LIKELY ARE YOU TO NOD OFF OR FALL ASLEEP WHILE SITTING AND TALKING TO SOMEONE: 0
HOW LIKELY ARE YOU TO NOD OFF OR FALL ASLEEP WHILE SITTING INACTIVE IN A PUBLIC PLACE: 0
HOW LIKELY ARE YOU TO NOD OFF OR FALL ASLEEP WHILE SITTING QUIETLY AFTER LUNCH WITHOUT ALCOHOL: 0
HOW LIKELY ARE YOU TO NOD OFF OR FALL ASLEEP WHILE WATCHING TV: 1
HOW LIKELY ARE YOU TO NOD OFF OR FALL ASLEEP WHILE LYING DOWN TO REST IN THE AFTERNOON WHEN CIRCUMSTANCES PERMIT: 1
HOW LIKELY ARE YOU TO NOD OFF OR FALL ASLEEP WHEN YOU ARE A PASSENGER IN A CAR FOR AN HOUR WITHOUT A BREAK: 0
HOW LIKELY ARE YOU TO NOD OFF OR FALL ASLEEP WHILE SITTING AND READING: 2

## 2020-11-02 NOTE — PROGRESS NOTES
PULMONARY MEDICINE OUTPATIENT  FOLLOW UP NOTE                                                                       PATIENT:  Juventino Ricardo  YOB: 1970  MRN: [id number]     REFERRED BY: LAMONT Deng CNP   CHIEF COMPLIANT: Sleep Apnea       HISTORY     HISTORY OF PRESENT ILLNESS:   Juventino Ricardo is a 48y.o. year old male here for follow-up.     TASHA:     Patient is morbidly obese and is s/p gastric sleeve surgery   He is being followed up for moderate obstructive sleep apnea and uses CPAP with a nasal mask   Patient denies excessive daytime sleepiness, total score on ESS is 4 today   He has had significant weight loss since his gastric sleeve surgery in October 2019 (435 pounds to 328 pounds now)   He reports that blood pressure on the CPAP seems to be \"too much\"    Sultan Sleepiness Scale:  Sleep Medicine 11/2/2020 11/4/2018 11/3/2018   Sitting and reading 2 - 3   Watching TV 1 - 0   Sitting, inactive in a public place (e.g. a theatre or a meeting) 0 - 0   As a passenger in a car for an hour without a break 0 - 0   Lying down to rest in the afternoon when circumstances permit 1 - 2   Sitting and talking to someone 0 - 0   Sitting quietly after a lunch without alcohol 0 - 0   In a car, while stopped for a few minutes in traffic 0 - 0   Total score 4 - 5   Neck circumference - 54 -         Baseline sleep study (11/3/2018): showed AHI of 26.5, REM AHI 71   Titration study (12/8/2018): recommended CPAP at 17 cm of H20   Compliance:   Patient reports good compliance to the PAP therapy  This is well corroborated by the recent compliance report  Compliance Data (7/22/2020-10/19/2020)  Current PAP settings CPAP at 7 cmH2O  Total Usage of PAP therapy 97% of nights  >4h/night usage is 90%,   Avg. Usage is 6 hr & 8 min   Residual AHI 4.3       PAST MEDICAL HISTORY:      Diagnosis Date    Abnormal stress test 03/2019    Allergic rhinitis     Asthma     Moderate persistent asthma, HISTORY:  Immunization History   Administered Date(s) Administered    Influenza Vaccine, unspecified formulation 10/15/2015, 10/26/2016        ALLERGIES:  Allergies   Allergen Reactions    Pcn [Penicillins] Anaphylaxis    Lidocaine      Pt unsure of reaction,  Happened as a child      MEDICATIONS:  Outpatient Medications Prior to Visit   Medication Sig Dispense Refill    calcium carbonate (TUMS) 500 MG chewable tablet Take 1 tablet by mouth daily      Multiple Vitamins-Minerals (THERAPEUTIC MULTIVITAMIN-MINERALS) tablet Take 1 tablet by mouth daily Indications: celebrate      traZODone (DESYREL) 100 MG tablet Take 1 tablet by mouth nightly (Patient taking differently: Take 100 mg by mouth nightly as needed for Sleep ) 30 tablet 5    vitamin D (ERGOCALCIFEROL) 1.25 MG (04832 UT) CAPS capsule Take 1 capsule by mouth once a week for 8 doses (Patient not taking: Reported on 9/18/2020) 8 capsule 0    albuterol sulfate HFA (PROAIR HFA) 108 (90 Base) MCG/ACT inhaler Inhale 2 puffs into the lungs every 6 hours as needed for Wheezing or Shortness of Breath (Patient not taking: Reported on 11/2/2020) 8.5 g 0     No facility-administered medications prior to visit.          REVIEW OF SYSTEMS:   Positives Negatives   General:  [] Fever   [] Chills   [] Rigors  [] Fatigue    [] Malaise    [x] Sleep disturbance    [] Weight gain   [x] Weight loss [x] Fever   [x] Chills   [x] Rigors  [x] Fatigue    [x] Malaise    [] Sleep disturbance    [] Weight gain   [] Weight loss   Psychological:  [] Anxiety   [] Depression [x] Anxiety   [x] Depression   Ophthalmic:  [] Eye redness  [] Blurry or decreased vision  [x] Eye redness  [x] Blurry or decreased vision    ENT: [] Epistaxis   [] Nasal congestion   [] Nasal discharge  [] Nasal polyps [x] Epistaxis   [x] Nasal congestion   [x] Nasal discharge  [x] Nasal polyps   Allergy and Immunology: [] Itchy/watery eyes  [] Postnasal drip  [] Seasonal allergies [x] Itchy/watery eyes  [x] Postnasal drip  [x] Seasonal allergies   Hematological and Lymphatic: [] Bleeding problems  [] Blood clots  [] Jaundice  [] Swollen lymph nodes [x] Bleeding problems  [x] Blood clots  [x] Jaundice  [x] Swollen lymph nodes   Endocrine: [] Polydipsia/polyuria  [] Temperature intolerance [x] Polydipsia/polyuria  [x] Temperature intolerance   Respiratory: [] Cough  [] Sputum  [] Hemoptysis  [] Shortness of breath  [] Orthopnea  [] Wheezing  [] Stridor  [] Pleuritic pain [x] Cough  [x] Sputum  [x] Hemoptysis  [x] Shortness of breath  [x] Orthopnea  [x] Wheezing  [x] Stridor  [x] Pleuritic pain   Cardiovascular: [] Chest pain  [x] Dyspnea on exertion  [] Paroxysmal nocturnal dyspnea  [] Palpitations/irregular heartbeat  [] Pedal edema [x] Chest pain  [] Dyspnea on exertion  [x] Paroxysmal nocturnal dyspnea  [x] Palpitations/irregular heartbeat  [x] Pedal edema   Gastrointestinal:  [] Abdominal pain  [] Nausea/vomiting  [] Constipation  []  Diarrhea [x] Abdominal pain  [x] Nausea/vomiting  [x] Constipation  [x]  Diarrhea   Genito-Urinary: [] Dysuria  [] Urinary frequency/urgency [x] Dysuria  [x] Urinary frequency/urgency   Musculoskeletal:  [] Joint pain/stiffness  [] Joint swelling [x] Joint pain/stiffness  [x] Joint swelling   Neurological: [] Headaches  [] Numbness/tingling  [] Weakness [x] Headaches  [x] Numbness/tingling  [x] Weakness   Dermatological: [] Skin lesion or changes [x] Skin lesion or changes        PHYSICAL EXAMINATION      VITAL SIGNS:   /82 (Site: Right Upper Arm, Position: Sitting, Cuff Size: Large Adult)   Pulse 76   Temp 97.4 °F (36.3 °C) (Temporal)   Resp 12   Ht 5' 6\" (1.676 m)   Wt (!) 328 lb (148.8 kg)   SpO2 100% Comment: room air  BMI 52.94 kg/m²   Wt Readings from Last 3 Encounters:   11/02/20 (!) 328 lb (148.8 kg)   09/18/20 (!) 324 lb 6.4 oz (147.1 kg)   09/02/20 (!) 324 lb (147 kg)     PHYSICAL EXAMINATION:   General appearance -  [x] well appearing  [x] morbidly obese[] cachectic [x] comfortable  [x] in no acute distress  []chronically ill-appearing  [] in mild to moderate respiratory distress   Mental status -  [x] alert  [x] oriented to person, place, and time  [] anxious  [] depressed mood    Head-  [x] atraumatic  [x] normocephalic   Eyes -  [x] pupils equal and reactive, extraocular eye movements intact  [x] sclera anicteric   Ears -  [x] hearing grossly normal bilaterally [] bilateral TM's and external ear canals normal   Nose -  [x] normal and patent [] no erythema  [] discharge   Mouth -  [x] mucous membranes moist  [] pharynx normal without lesions [] erythematous  [] exudate noted   Neck -  [x] supple  [x] no significant adenopathy  [x] carotids upstroke normal bilaterally [x] no JVD  [x] no bruit   Lymphatics -  [x] no palpable lymphadenopathy  [] no hepatosplenomegaly   Chest -   [x] normal chest excursion  [x] no chest wall tenderness  [] increased AP diameter[] pectus noted [] scoliosis noted [x] no tachypnea retraction or cyanosis [x] clear to auscultation, no wheezes, rales or rhonchi, symmetric air entry  [] wheezing noted  [] rales noted  []rhonchi noted [] decreased air entry noted bilaterally   Heart -  [x] normal rate,  [x] regular rhythm  [] irregularly irregular rhythm [x] normal S1  [x] normal S2  [x] no murmurs, rubs, clicks or gallops  [] S3 present  [] S4 present  [] systolic murmur  [] diastolic murmur  [] midsystolic click []pericardial rub present    Abdomen -  [x] soft [x] nontender  [x] nondistended  [] no masses or organomegaly   Neurological -  [x] normal speech  [x] no focal findings or movement disorder noted  [x] cranial nerves II through XII intact  [] DTR's normal and symmetric  [] Babinski sign negative,  [x] motor and sensory grossly normal bilaterally  [] normal muscle tone [x] no tremors  [] strength 5/5  [] Romberg sign negative [x] normal gait and station   Musculoskeletal -  [x] no joint tenderness [x] no deformity or swelling   Extremities - [x] no clubbing or cyanosis,  [x] no pedal edema [] pedal edema  [x] intact peripheral pulses   Skin -  [x] normal coloration and turgor  [x] no rashes  [x] no suspicious skin lesions noted        LABORATORY DATA      LABS:  ABG:   No results found for: PH, PHART, PCO2, NUB9BLZ, PO2, PO2ART, HCO3, ZQF1MMM, BE, BEART, THGB, Z6DBNOLX  VBG:  No results found for: PHVEN, DGY3EGL, BEVEN, K2HUQOGN  CBC:   Lab Results   Component Value Date    WBC 7.1 05/31/2020    RBC 5.60 05/31/2020    HGB 16.6 05/31/2020    HCT 50.0 05/31/2020     05/31/2020    MCV 89.3 05/31/2020    MCH 29.6 05/31/2020    MCHC 33.2 05/31/2020    RDW 12.8 05/31/2020    LYMPHOPCT 35 05/31/2020    MONOPCT 10 05/31/2020    BASOPCT 1 05/31/2020    MONOSABS 0.73 05/31/2020    LYMPHSABS 2.48 05/31/2020    EOSABS 0.31 05/31/2020    BASOSABS 0.06 05/31/2020    DIFFTYPE NOT REPORTED 05/31/2020     Eosinophils/IgE:   Lab Results   Component Value Date    EOSABS 0.31 05/31/2020     Alpha 1 antitrypsin: No results found for: A1A  CMP:   Lab Results   Component Value Date     05/31/2020    K 4.6 05/31/2020     05/31/2020    CO2 25 05/31/2020    BUN 12 05/31/2020    CREATININE 0.75 05/31/2020    GLUCOSE 95 05/31/2020    MG 2.5 05/31/2020    CALCIUM 9.3 05/31/2020    URICACID 6.7 10/08/2016    PROT 7.1 05/31/2020    LABALBU 4.1 05/31/2020    BILITOT 0.48 05/31/2020    ALT 16 05/31/2020    AST 19 05/31/2020    ALKPHOS 68 05/31/2020     Coagulation Profile:   Lab Results   Component Value Date    INR 1.0 09/03/2019    PROTIME 10.4 09/03/2019    APTT 24.6 09/03/2019     BNP:   No results found for: BNP, PROBNP  D-Dimer/Fibrinogen:  No results found for: DDIMER  Others labs:  Lab Results   Component Value Date    TSH 1.58 05/31/2020     Lab Results   Component Value Date    CRP 10.5 (H) 08/21/2013     Lab Results   Component Value Date    IRON 92 05/31/2020    TIBC 274 05/31/2020    FERRITIN 149 05/31/2020    FOLATE 13.0 05/31/2020     No results found for: SPEP, UPEP, PSA, CEA, , GK8035,   No results found for: RPR, HIV    RADIOLOGY:  Chest x-ray: 9/3/2019  Impression    Negative chest.      PULMONARY FUNCTION TEST:  No results found for: FEV1, FVC, QJN5YNB, TLC, DLCO    ECHOCARDIOGRAM:   No results found for this or any previous visit. CARDIAC CATHETERIZATION:  No results found for this or any previous visit. ASSESSMENT AND PLAN     ASSESSMENT:    ICD-10-CM    1. TASHA on CPAP  G47.33 Sleep Study with PAP Titration    Z99.89    2. Morbid obesity with BMI of 50.0-59.9, adult ([facility]  E66.01     Z68.43      PLAN/RECOMMENDATIONS:     Patient denies any excessive daytime sleepiness and reports refreshing and restorative sleep   Patient is using PAP as recommended and is compliant with the therapy   Compliance data was reviewed   He reports that his CPAP pressure seems to be high now since he has lost significant weight after his gastric sleeve surgery   We will order retitration study   Continue to use CPAP for at least 4 hours every night   Use heated humidification, if needed   Weight loss was recommended and discussed   Recommended good sleep hygiene and instructions provided   Patient instructed not to drive or operate heavy machinery, if sleepy     Patient asymptomatic from pulmonary standpoint   Patient received counseling on the following healthy behaviors: nutrition, exercise and medication adherence   Maintain an active lifestyle   Lung Cancer Screening: After reviewing the patient's smoking history, age and other clinical criteria, the LOW DOSE CT is : NOT INDICATED; Nonsmoker/Smoking <30 pack-years    Recommend influenza vaccination in the fall annually; Up-to-date    Recommendations were given regarding pneumococcal vaccination;  Up-to-date    All the questions that the patient had were answered to his satisfaction  We'll see the patient back in 6 weeks  Thank you for having us involved in the care of your patient. Please call us if you have any questions or concerns. Renea Marin MD    Pulmonary and Critical Care Medicine            Please note that this chart was generated using voice recognition Dragon dictation software. Although every effort was made to ensure the accuracy of this automated transcription, some errors in transcription may have occurred.

## 2020-11-29 ENCOUNTER — HOSPITAL ENCOUNTER (OUTPATIENT)
Dept: PREADMISSION TESTING | Age: 50
Discharge: HOME | End: 2020-12-03
Payer: COMMERCIAL

## 2020-11-29 PROCEDURE — U0003 INFECTIOUS AGENT DETECTION BY NUCLEIC ACID (DNA OR RNA); SEVERE ACUTE RESPIRATORY SYNDROME CORONAVIRUS 2 (SARS-COV-2) (CORONAVIRUS DISEASE [COVID-19]), AMPLIFIED PROBE TECHNIQUE, MAKING USE OF HIGH THROUGHPUT TECHNOLOGIES AS DESCRIBED BY CMS-2020-01-R: HCPCS

## 2020-11-30 LAB
SARS-COV-2, RAPID: NORMAL
SARS-COV-2: NORMAL
SARS-COV-2: NOT DETECTED
SOURCE: NORMAL

## 2020-12-02 ENCOUNTER — OFFICE VISIT (OUTPATIENT)
Dept: BARIATRICS/WEIGHT MGMT | Age: 50
End: 2020-12-02
Payer: COMMERCIAL

## 2020-12-02 VITALS
TEMPERATURE: 97 F | HEIGHT: 66 IN | WEIGHT: 315 LBS | SYSTOLIC BLOOD PRESSURE: 116 MMHG | HEART RATE: 76 BPM | RESPIRATION RATE: 18 BRPM | DIASTOLIC BLOOD PRESSURE: 72 MMHG | BODY MASS INDEX: 50.62 KG/M2

## 2020-12-02 PROCEDURE — G8484 FLU IMMUNIZE NO ADMIN: HCPCS | Performed by: NURSE PRACTITIONER

## 2020-12-02 PROCEDURE — 99213 OFFICE O/P EST LOW 20 MIN: CPT | Performed by: NURSE PRACTITIONER

## 2020-12-02 PROCEDURE — 3017F COLORECTAL CA SCREEN DOC REV: CPT | Performed by: NURSE PRACTITIONER

## 2020-12-02 PROCEDURE — 1036F TOBACCO NON-USER: CPT | Performed by: NURSE PRACTITIONER

## 2020-12-02 PROCEDURE — G8427 DOCREV CUR MEDS BY ELIG CLIN: HCPCS | Performed by: NURSE PRACTITIONER

## 2020-12-02 PROCEDURE — G8417 CALC BMI ABV UP PARAM F/U: HCPCS | Performed by: NURSE PRACTITIONER

## 2020-12-02 NOTE — PROGRESS NOTES
Post-op Bariatric Surgery Note    Subjective     Patient is 14 months s/p laparoscopic sleeve gastrectomy, down 119 lbs. Overall, doing well. Incisions well healed. Consistent use of MVI and calcium. Physical activity includes walking. No pain or other specific problems or concerns. Allergies: Allergies   Allergen Reactions    Pcn [Penicillins] Anaphylaxis    Lidocaine      Pt unsure of reaction,  Happened as a child       Past Medical History:     Past Medical History:   Diagnosis Date    Abnormal stress test 03/2019    Allergic rhinitis     Asthma     Moderate persistent asthma, unspecified whether complicated    Borderline diabetes mellitus     does not take meds regularly    History of cardiac cath 2019    History of Helicobacter pylori infection     has gone through 4 rounds of treatment, tested negative 10/2019    Hypertension     FP, NP. Kristy Berumen, will be seen on 9/9/2019 pror to spencer on 9/16/2019    Obesity     TASHA on CPAP     CPAP NIGHTLY, Dr. Ora Smalls    Positive H. pylori test 06/2019    Post-nasal drip     Sprain, lumbar     Wears glasses    .     Past Surgical History:  Past Surgical History:   Procedure Laterality Date    CARDIAC CATHETERIZATION  04/17/2019    Cardiologist, Dr. Carolin Ruiz (no stents)    GASTRIC BYPASS SURGERY  10/22/2019    robotic laparascopic gastric sleeve    HEEL SPUR SURGERY Right 2009    ROTATOR CUFF REPAIR Left 2010    SLEEVE GASTRECTOMY N/A 10/22/2019    XI ROBOTIC LAPAROSCOPIC GASTRIC SLEEVE, EGD- GI UNIT SCHEDULED performed by Jay Jay Alcocer DO at [address] [place] [address] N/A 3/8/2019    EGD BIOPSY performed by Jay Jay Alcocer DO at [facility]       Family History:  Family History   Problem Relation Age of Onset    Lung Cancer Maternal Grandfather     Broken Bones Paternal Grandmother         hip    Glaucoma Paternal Grandfather     Diabetes Mother     High Cholesterol Mother     Mental Illness Mother     Heart Disease Mother     High Blood Pressure Mother     Depression Mother     Sleep Apnea Mother     Other Mother         GERD    High Cholesterol Father     Diabetes Father     High Blood Pressure Father     Asthma Father     Sleep Apnea Father    Stan Loser Cancer Sister 48        unsure of name, lived 2 weeks after diagnosis       Social History:  Social History     Socioeconomic History    Marital status: Legally      Spouse name: Not on file    Number of children: 0    Years of education: Not on file    Highest education level: Not on file   Occupational History    Not on file   Social Needs    Financial resource strain: Not on file    Food insecurity     Worry: Not on file     Inability: Not on file   [employer] needs     Medical: Not on file     Non-medical: Not on file   Tobacco Use    Smoking status: Never Smoker    Smokeless tobacco: Never Used   Substance and Sexual Activity    Alcohol use: Not Currently     Alcohol/week: 0.0 standard drinks     Comment: rarely    Drug use: Never    Sexual activity: Not Currently     Partners: Female     Comment: girlfriend had tubal   Lifestyle    Physical activity     Days per week: Not on file     Minutes per session: Not on file    Stress: Not on file   Relationships    Social connections     Talks on phone: Not on file     Gets together: Not on file     Attends Faith service: Not on file     Active member of club or organization: Not on file     Attends meetings of clubs or organizations: Not on file     Relationship status: Not on file    Intimate partner violence     Fear of current or ex partner: Not on file     Emotionally abused: Not on file     Physically abused: Not on file     Forced sexual activity: Not on file   Other Topics Concern    Not on file   Social History Narrative    Not on file       Current Medications:  Current Outpatient Medications   Medication Sig Dispense Refill    calcium carbonate (TUMS) 500 MG chewable tablet Take 1 tablet by mouth daily      Multiple Vitamins-Minerals (THERAPEUTIC MULTIVITAMIN-MINERALS) tablet Take 1 tablet by mouth daily Indications: celebrate      traZODone (DESYREL) 100 MG tablet Take 1 tablet by mouth nightly (Patient taking differently: Take 100 mg by mouth nightly as needed for Sleep ) 30 tablet 5    albuterol sulfate HFA (PROAIR HFA) 108 (90 Base) MCG/ACT inhaler Inhale 2 puffs into the lungs every 6 hours as needed for Wheezing or Shortness of Breath (Patient not taking: Reported on 11/2/2020) 8.5 g 0     No current facility-administered medications for this visit. Vital Signs:  /72 (Site: Right Upper Arm, Position: Sitting, Cuff Size: Large Adult)   Pulse 76   Temp 97 °F (36.1 °C) (Infrared)   Resp 18   Ht 5' 6\" (1.676 m)   Wt (!) 316 lb (143.3 kg)   BMI 51.00 kg/m²     BMI/Height/Weight:  Body mass index is 51 kg/m². Review of Systems - A review of systems was performed. All was negative unless otherwise documented in HPI. Constitutional: Negative for fever, chills and diaphoresis. HENT: Negative for hearing loss and trouble swallowing. Eyes: Negative for photophobia and visual disturbance. Respiratory: Negative for cough, shortness of breath and wheezing. Cardiovascular: Negative for chest pain and palpitations. Gastrointestinal: Negative for nausea, vomiting, abdominal pain, diarrhea, constipation, blood in stool and abdominal distention. Endocrine: Negative for polydipsia, polyphagia and polyuria. Genitourinary: Negative for dysuria, frequency, hematuria and difficulty urinating. Musculoskeletal: Negative for myalgias, joint swelling. Skin: Negative for pallor and rash. Neurological: Negative for dizziness, tremors, light-headedness and headaches. Psychiatric/Behavioral: Negative for sleep disturbance and dysphoric mood. Objective:      Physical Exam   Vital signs reviewed.   General: Well-developed and well-nourished. No acute distress. Skin: Warm, dry and intact. HEENT: Normocephalic. EOMs intact. Conjunctivae normal. Neck supple. Cardiovascular: Normal rate, regular rhythm. Pulmonary/Chest: Normal effort. Lungs clear to auscultation. No rales, rhonchi or wheezing. Abdominal: Positive bowel sounds. Soft, nontender. Nondistended. No rigidity, rebound, or guarding. Musculoskeletal: Movement x4. No edema. Neurological: Gait normal. Alert and oriented to person, place, and time. Psychiatric: Normal mood and affect. Speech and behavior normal. Judgment and thought content normal. Cognition and memory intact. Assessment:       Diagnosis Orders   1. Obstructive sleep apnea syndrome  CBC Auto Differential    Comprehensive Metabolic Panel    Ferritin    Hemoglobin A1C    Iron and TIBC    Magnesium    PTH, Intact    T4, Free    TSH without Reflex    Vitamin A    Vitamin B1, Whole Blood    Vitamin B12    Vitamin D 25 Hydroxy    Zinc   2. Uncomplicated asthma, unspecified asthma severity, unspecified whether persistent  CBC Auto Differential    Comprehensive Metabolic Panel    Ferritin    Hemoglobin A1C    Iron and TIBC    Magnesium    PTH, Intact    T4, Free    TSH without Reflex    Vitamin A    Vitamin B1, Whole Blood    Vitamin B12    Vitamin D 25 Hydroxy    Zinc   3. Low iron  CBC Auto Differential    Comprehensive Metabolic Panel    Ferritin    Hemoglobin A1C    Iron and TIBC    Magnesium    PTH, Intact    T4, Free    TSH without Reflex    Vitamin A    Vitamin B1, Whole Blood    Vitamin B12    Vitamin D 25 Hydroxy    Zinc   4. S/P laparoscopic sleeve gastrectomy  CBC Auto Differential    Comprehensive Metabolic Panel    Ferritin    Hemoglobin A1C    Iron and TIBC    Magnesium    PTH, Intact    T4, Free    TSH without Reflex    Vitamin A    Vitamin B1, Whole Blood    Vitamin B12    Vitamin D 25 Hydroxy    Zinc   5.  Morbid obesity (HCC)  CBC Auto Differential    Comprehensive Metabolic Panel Ferritin    Hemoglobin A1C    Iron and TIBC    Magnesium    PTH, Intact    T4, Free    TSH without Reflex    Vitamin A    Vitamin B1, Whole Blood    Vitamin B12    Vitamin D 25 Hydroxy    Zinc       Plan:    Dietitian visit today. Patient to continue to increase protein to obtain 60-80g/day, at least 48-64oz of fluid daily, and gradually increase exercise regimen. Follow-up  Return in about 6 months (around 6/2/2021). Orders this encounter:  Orders Placed This Encounter   Procedures    CBC Auto Differential     Standing Status:   Future     Standing Expiration Date:   12/2/2021    Comprehensive Metabolic Panel     Standing Status:   Future     Standing Expiration Date:   12/2/2021    Ferritin     Standing Status:   Future     Standing Expiration Date:   12/2/2021    Hemoglobin A1C     Standing Status:   Future     Standing Expiration Date:   12/2/2021    Iron and TIBC     Standing Status:   Future     Standing Expiration Date:   12/2/2021     Order Specific Question:   Is Patient Fasting? Answer:   yes     Order Specific Question:   No of Hours?      Answer:   15    Magnesium     Standing Status:   Future     Standing Expiration Date:   12/2/2021    PTH, Intact     Standing Status:   Future     Standing Expiration Date:   12/2/2021    T4, Free     Standing Status:   Future     Standing Expiration Date:   12/2/2021    TSH without Reflex     Standing Status:   Future     Standing Expiration Date:   12/2/2021    Vitamin A     Standing Status:   Future     Standing Expiration Date:   12/2/2021    Vitamin B1, Whole Blood     Standing Status:   Future     Standing Expiration Date:   12/2/2021    Vitamin B12     Standing Status:   Future     Standing Expiration Date:   12/2/2021    Vitamin D 25 Hydroxy     Standing Status:   Future     Standing Expiration Date:   12/2/2021    Zinc     Standing Status:   Future     Standing Expiration Date:   12/2/2021       Prescriptions this encounter:  No orders of the defined types were placed in this encounter.       Electronically signed by:  Mishel Bowman CNP

## 2020-12-03 ENCOUNTER — HOSPITAL ENCOUNTER (OUTPATIENT)
Dept: SLEEP CENTER | Age: 50
Discharge: HOME | End: 2020-12-05
Payer: COMMERCIAL

## 2020-12-03 VITALS
RESPIRATION RATE: 20 BRPM | HEART RATE: 56 BPM | BODY MASS INDEX: 48.21 KG/M2 | OXYGEN SATURATION: 96 % | TEMPERATURE: 96 F | HEIGHT: 66 IN | WEIGHT: 300 LBS

## 2020-12-03 PROCEDURE — 95811 POLYSOM 6/>YRS CPAP 4/> PARM: CPT

## 2020-12-10 LAB — STATUS: NORMAL

## 2021-02-15 ENCOUNTER — OFFICE VISIT (OUTPATIENT)
Dept: PULMONOLOGY | Age: 51
End: 2021-02-15
Payer: COMMERCIAL

## 2021-02-15 VITALS
HEART RATE: 77 BPM | SYSTOLIC BLOOD PRESSURE: 119 MMHG | HEIGHT: 66 IN | WEIGHT: 315 LBS | DIASTOLIC BLOOD PRESSURE: 73 MMHG | OXYGEN SATURATION: 97 % | BODY MASS INDEX: 50.62 KG/M2

## 2021-02-15 DIAGNOSIS — G47.33 OSA ON CPAP: Primary | ICD-10-CM

## 2021-02-15 DIAGNOSIS — Z99.89 OSA ON CPAP: Primary | ICD-10-CM

## 2021-02-15 DIAGNOSIS — E66.01 MORBID OBESITY WITH BMI OF 50.0-59.9, ADULT (HCC): ICD-10-CM

## 2021-02-15 PROCEDURE — 3017F COLORECTAL CA SCREEN DOC REV: CPT | Performed by: INTERNAL MEDICINE

## 2021-02-15 PROCEDURE — G8484 FLU IMMUNIZE NO ADMIN: HCPCS | Performed by: INTERNAL MEDICINE

## 2021-02-15 PROCEDURE — G8417 CALC BMI ABV UP PARAM F/U: HCPCS | Performed by: INTERNAL MEDICINE

## 2021-02-15 PROCEDURE — 99214 OFFICE O/P EST MOD 30 MIN: CPT | Performed by: INTERNAL MEDICINE

## 2021-02-15 PROCEDURE — G8427 DOCREV CUR MEDS BY ELIG CLIN: HCPCS | Performed by: INTERNAL MEDICINE

## 2021-02-15 PROCEDURE — 1036F TOBACCO NON-USER: CPT | Performed by: INTERNAL MEDICINE

## 2021-02-15 ASSESSMENT — SLEEP AND FATIGUE QUESTIONNAIRES
HOW LIKELY ARE YOU TO NOD OFF OR FALL ASLEEP WHILE LYING DOWN TO REST IN THE AFTERNOON WHEN CIRCUMSTANCES PERMIT: 2
HOW LIKELY ARE YOU TO NOD OFF OR FALL ASLEEP WHILE WATCHING TV: 1
ESS TOTAL SCORE: 7
HOW LIKELY ARE YOU TO NOD OFF OR FALL ASLEEP WHILE SITTING INACTIVE IN A PUBLIC PLACE: 0
HOW LIKELY ARE YOU TO NOD OFF OR FALL ASLEEP WHILE SITTING AND TALKING TO SOMEONE: 0
HOW LIKELY ARE YOU TO NOD OFF OR FALL ASLEEP WHILE SITTING QUIETLY AFTER LUNCH WITHOUT ALCOHOL: 0
HOW LIKELY ARE YOU TO NOD OFF OR FALL ASLEEP WHEN YOU ARE A PASSENGER IN A CAR FOR AN HOUR WITHOUT A BREAK: 1

## 2021-04-02 ENCOUNTER — OFFICE VISIT (OUTPATIENT)
Dept: FAMILY MEDICINE CLINIC | Age: 51
End: 2021-04-02
Payer: COMMERCIAL

## 2021-04-02 VITALS
RESPIRATION RATE: 18 BRPM | DIASTOLIC BLOOD PRESSURE: 74 MMHG | WEIGHT: 315 LBS | OXYGEN SATURATION: 98 % | TEMPERATURE: 97.1 F | SYSTOLIC BLOOD PRESSURE: 112 MMHG | BODY MASS INDEX: 51.78 KG/M2 | HEART RATE: 81 BPM

## 2021-04-02 DIAGNOSIS — R25.1 TREMOR: ICD-10-CM

## 2021-04-02 DIAGNOSIS — E66.01 MORBID OBESITY (HCC): ICD-10-CM

## 2021-04-02 DIAGNOSIS — E55.9 VITAMIN D DEFICIENCY: ICD-10-CM

## 2021-04-02 DIAGNOSIS — J45.909 UNCOMPLICATED ASTHMA, UNSPECIFIED ASTHMA SEVERITY, UNSPECIFIED WHETHER PERSISTENT: Primary | ICD-10-CM

## 2021-04-02 DIAGNOSIS — G47.33 OBSTRUCTIVE SLEEP APNEA SYNDROME: ICD-10-CM

## 2021-04-02 PROCEDURE — 1036F TOBACCO NON-USER: CPT | Performed by: NURSE PRACTITIONER

## 2021-04-02 PROCEDURE — G8417 CALC BMI ABV UP PARAM F/U: HCPCS | Performed by: NURSE PRACTITIONER

## 2021-04-02 PROCEDURE — G8427 DOCREV CUR MEDS BY ELIG CLIN: HCPCS | Performed by: NURSE PRACTITIONER

## 2021-04-02 PROCEDURE — 99214 OFFICE O/P EST MOD 30 MIN: CPT | Performed by: NURSE PRACTITIONER

## 2021-04-02 PROCEDURE — 3017F COLORECTAL CA SCREEN DOC REV: CPT | Performed by: NURSE PRACTITIONER

## 2021-04-02 ASSESSMENT — ENCOUNTER SYMPTOMS
SHORTNESS OF BREATH: 0
BACK PAIN: 1
RHINORRHEA: 1
CONSTIPATION: 0
CHEST TIGHTNESS: 0
BLOOD IN STOOL: 0
TROUBLE SWALLOWING: 0
DIARRHEA: 0
SINUS PRESSURE: 0
COUGH: 1
SORE THROAT: 0
ABDOMINAL PAIN: 0
WHEEZING: 0
NAUSEA: 0

## 2021-04-02 ASSESSMENT — PATIENT HEALTH QUESTIONNAIRE - PHQ9
2. FEELING DOWN, DEPRESSED OR HOPELESS: 0
SUM OF ALL RESPONSES TO PHQ9 QUESTIONS 1 & 2: 0
SUM OF ALL RESPONSES TO PHQ QUESTIONS 1-9: 0

## 2021-04-02 NOTE — PROGRESS NOTES
[address]  [phone]    4/2/21     Patient ID: Ayana Riddle is a 46 y.o. male  Established patient    Chief Complaint  Ayana Riddle presents today for Back Pain and Asthma      Have you seen any other physician or provider since your last visit? Yes - Records Obtained Pulmonology, Dr. Trudi Donato   Have you had any other diagnostic tests since your last visit? Yes - Records Obtained Sleep Study, COVID Screening- Negative  Have you been seen in the emergency room and/or had an admission to a hospital since we last saw you? No    ASSESSMENT/PLAN    1. Uncomplicated asthma, unspecified asthma severity, unspecified whether persistent  2. Obstructive sleep apnea syndrome  3. Tremor  4. Vitamin D deficiency  5. Morbid obesity ([facility] [address]  -     Urinalysis Reflex to Culture; Future       Return in about 6 months (around 10/2/2021). On this date 4/2/2021 I have spent 30 minutes with patient/family/caregiver. Prolonged services are due to the preparation of seeing the patient. This includes, but is not limited to, reviewing documentation, obtaining and/or reviewing separately obtained history. Performing a medically appropriate examination and/or evaluation. Counseling and educating patient/family/caregiver regarding diagnosis and importance of compliance with the treatment plan. Ordering medications, tests, and/or procedures. Documenting clinical information in the electronic health record. Independently interpreting results that were not separately reported to patient/family/caregiver. Referring and/or communicating with other healthcare professionals to correlate care.         Patient Care Team:  LAMONT Alcazar CNP as PCP - Family Medicine (Nurse Practitioner Family)  David Correia MD as Consulting Physician (Pulmonary Disease)  LAMONT Thomas CNP (Nurse Practitioner Adult Health)    SUBJECTIVE/OBJECTIVE    History of Present illness / Visit Summary   Mckayla Harper is a very pleasant 51-year-old  male. He presents the office today for his follow-up appointment. We did spend time reviewing past medical and surgical history. We also reviewed pharmacology and recent lab analysis and imaging. It is noted the patient still has outstanding lab work to be completed by bariatric surgery. He did have gastric bypass many years ago and still follows for lab analysis on yearly basis. He does comment that he is noticing his left hand/left forearm tremors on occasion. I did discuss with patient that I would look into this further as it could simply be a vitamin deficiency. Therefore I emphasized to please get his lab work completed soon as possible. Otherwise if his lab analysis and vitamins are within normal limits then further testing will be advised. Review of Systems  Review of Systems   Constitutional: Positive for fatigue. Negative for activity change, appetite change, chills and fever. HENT: Positive for rhinorrhea and sneezing. Negative for congestion, ear pain, postnasal drip, sinus pressure, sore throat and trouble swallowing. Respiratory: Positive for cough (dry). Negative for chest tightness, shortness of breath and wheezing. Cardiovascular: Negative for chest pain, palpitations and leg swelling. Gastrointestinal: Negative for abdominal pain, blood in stool, constipation, diarrhea and nausea. Genitourinary: Negative for difficulty urinating, dysuria, frequency, hematuria and urgency. Musculoskeletal: Positive for back pain (lumbar, chronic ). Negative for arthralgias, gait problem, joint swelling and myalgias. Skin: Negative for rash and wound. Allergic/Immunologic: Negative for environmental allergies. Neurological: Positive for tremors (left hand, random ). Negative for dizziness, syncope, light-headedness, numbness and headaches. Hematological: Does not bruise/bleed easily. Psychiatric/Behavioral: Negative for agitation, decreased concentration, self-injury, sleep disturbance and suicidal ideas. The patient is not nervous/anxious. Physical exam   Physical Exam  Vitals signs and nursing note reviewed. Constitutional:       General: He is not in acute distress. Appearance: Normal appearance. He is well-developed and well-groomed. He is morbidly obese. He is not ill-appearing or toxic-appearing. Cardiovascular:      Rate and Rhythm: Normal rate and regular rhythm. No extrasystoles are present. Heart sounds: Normal heart sounds, S1 normal and S2 normal. No murmur. Pulmonary:      Effort: Pulmonary effort is normal. No prolonged expiration or respiratory distress. Breath sounds: Normal breath sounds and air entry. Abdominal:      Comments: Obese    Skin:     General: Skin is warm and dry. Coloration: Skin is not ashen, cyanotic, jaundiced or pale. Neurological:      Mental Status: He is alert and oriented to person, place, and time. Motor: Tremor (left arm ) present. Gait: Gait is intact. Psychiatric:         Attention and Perception: Attention and perception normal.         Mood and Affect: Mood and affect normal.         Speech: Speech normal.         Behavior: Behavior normal. Behavior is cooperative. Thought Content: Thought content normal. Thought content does not include suicidal ideation. Thought content does not include suicidal plan. Cognition and Memory: Cognition and memory normal.         Judgment: Judgment normal.      Comments: Pleasant         Medical history    [x] Vital signs, allergies, laboratory results, imaging, current/recently discontinued medications, health maintenance, immunizations, past medical, surgical, and social history, and referral notes (if available) were reviewed per writer.     Quality Measures    [x] Reviewed Depression screening if taken or valid today or any other valid screening tool (others seen below) Interpretation of Total Score DepressionSeverity: 1-4 = Minimal depression, 5-9 = Mild depression, 10-14 = Moderate depression, 15-19 = Moderately severe depression, 20-27 =Severe depression    PHQ Scores 4/2/2021 8/31/2020 7/10/2019 7/23/2018 4/24/2017   PHQ2 Score 0 0 2 0 0   PHQ9 Score 0 0 2 0 0       Interpretation of Total Score DepressionSeverity: 1-4 = Minimal depression, 5-9 = Mild depression, 10-14 = Moderate depression, 15-19 = Moderately severe depression, 20-27 = Severe depression    BMI Readings from Last 1 Encounters:   04/02/21 51.78 kg/m²        Lab Results   Component Value Date    LABA1C 5.5 04/10/2021       BP Readings from Last 3 Encounters:   04/02/21 112/74   02/15/21 119/73   12/02/20 116/72        The 10-year ASCVD risk score (Vargas Beckett, et al., 2013) is: 2.3%    Values used to calculate the score:      Age: 46 years      Sex: Male      Is Non- : No      Diabetic: No      Tobacco smoker: No      Systolic Blood Pressure: 133 mmHg      Is BP treated: No      HDL Cholesterol: 39 mg/dL      Total Cholesterol: 135 mg/dL        Electronically signed by ESMER Hylton on 4/18/2021 at 11:29 AM    This note is created with the assistance of a speech-recognition program. While intending to generate a document that actually reflects the content of the visit, no guarantees can be provided that every mistake has been identified and corrected by editing.

## 2021-04-03 PROBLEM — A04.8 HELICOBACTER PYLORI INFECTION: Status: RESOLVED | Noted: 2020-08-31 | Resolved: 2021-04-03

## 2021-04-10 ENCOUNTER — HOSPITAL ENCOUNTER (OUTPATIENT)
Age: 51
Discharge: HOME | End: 2021-04-10
Payer: COMMERCIAL

## 2021-04-10 DIAGNOSIS — E66.01 MORBID OBESITY (HCC): ICD-10-CM

## 2021-04-10 DIAGNOSIS — G47.33 OBSTRUCTIVE SLEEP APNEA SYNDROME: ICD-10-CM

## 2021-04-10 DIAGNOSIS — E61.1 LOW IRON: ICD-10-CM

## 2021-04-10 DIAGNOSIS — J45.909 UNCOMPLICATED ASTHMA, UNSPECIFIED ASTHMA SEVERITY, UNSPECIFIED WHETHER PERSISTENT: ICD-10-CM

## 2021-04-10 DIAGNOSIS — Z98.84 S/P LAPAROSCOPIC SLEEVE GASTRECTOMY: ICD-10-CM

## 2021-04-10 LAB
-: NORMAL
ABSOLUTE EOS #: 0.26 K/UL (ref 0–0.44)
ABSOLUTE IMMATURE GRANULOCYTE: <0.03 K/UL (ref 0–0.3)
ABSOLUTE LYMPH #: 2.06 K/UL (ref 1.1–3.7)
ABSOLUTE MONO #: 0.75 K/UL (ref 0.1–1.2)
ALBUMIN SERPL-MCNC: 4.1 G/DL (ref 3.5–5.2)
ALBUMIN/GLOBULIN RATIO: 1.4 (ref 1–2.5)
ALP BLD-CCNC: 61 U/L (ref 40–129)
ALT SERPL-CCNC: 11 U/L (ref 5–41)
AMORPHOUS: NORMAL
ANION GAP SERPL CALCULATED.3IONS-SCNC: 8 MMOL/L (ref 9–17)
AST SERPL-CCNC: 17 U/L
BACTERIA: NORMAL
BASOPHILS # BLD: 1 % (ref 0–2)
BASOPHILS ABSOLUTE: 0.07 K/UL (ref 0–0.2)
BILIRUB SERPL-MCNC: 0.4 MG/DL (ref 0.3–1.2)
BILIRUBIN URINE: NEGATIVE
BUN BLDV-MCNC: 11 MG/DL (ref 6–20)
BUN/CREAT BLD: ABNORMAL (ref 9–20)
CALCIUM SERPL-MCNC: 9 MG/DL (ref 8.6–10.4)
CASTS UA: NORMAL /LPF (ref 0–8)
CHLORIDE BLD-SCNC: 103 MMOL/L (ref 98–107)
CO2: 29 MMOL/L (ref 20–31)
COLOR: YELLOW
COMMENT UA: ABNORMAL
CREAT SERPL-MCNC: 0.69 MG/DL (ref 0.7–1.2)
CRYSTALS, UA: NORMAL /HPF
DIFFERENTIAL TYPE: NORMAL
EOSINOPHILS RELATIVE PERCENT: 4 % (ref 1–4)
EPITHELIAL CELLS UA: NORMAL /HPF (ref 0–5)
FERRITIN: 101 UG/L (ref 30–400)
GFR AFRICAN AMERICAN: >60 ML/MIN
GFR NON-AFRICAN AMERICAN: >60 ML/MIN
GFR SERPL CREATININE-BSD FRML MDRD: ABNORMAL ML/MIN/{1.73_M2}
GFR SERPL CREATININE-BSD FRML MDRD: ABNORMAL ML/MIN/{1.73_M2}
GLUCOSE BLD-MCNC: 100 MG/DL (ref 70–99)
GLUCOSE URINE: NEGATIVE
HCT VFR BLD CALC: 46.6 % (ref 40.7–50.3)
HEMOGLOBIN: 15.3 G/DL (ref 13–17)
IMMATURE GRANULOCYTES: 0 %
IRON SATURATION: 32 % (ref 20–55)
IRON: 90 UG/DL (ref 59–158)
KETONES, URINE: NEGATIVE
LEUKOCYTE ESTERASE, URINE: NEGATIVE
LYMPHOCYTES # BLD: 32 % (ref 24–43)
MAGNESIUM: 2.3 MG/DL (ref 1.6–2.6)
MCH RBC QN AUTO: 29.4 PG (ref 25.2–33.5)
MCHC RBC AUTO-ENTMCNC: 32.8 G/DL (ref 28.4–34.8)
MCV RBC AUTO: 89.4 FL (ref 82.6–102.9)
MONOCYTES # BLD: 12 % (ref 3–12)
MUCUS: NORMAL
NITRITE, URINE: NEGATIVE
NRBC AUTOMATED: 0 PER 100 WBC
OTHER OBSERVATIONS UA: NORMAL
PDW BLD-RTO: 12.1 % (ref 11.8–14.4)
PH UA: 8.5 (ref 5–8)
PLATELET # BLD: 212 K/UL (ref 138–453)
PLATELET ESTIMATE: NORMAL
PMV BLD AUTO: 10.2 FL (ref 8.1–13.5)
POTASSIUM SERPL-SCNC: 4.5 MMOL/L (ref 3.7–5.3)
PROTEIN UA: NEGATIVE
PTH INTACT: 59.7 PG/ML (ref 15–65)
RBC # BLD: 5.21 M/UL (ref 4.21–5.77)
RBC # BLD: NORMAL 10*6/UL
RBC UA: NORMAL /HPF (ref 0–4)
RENAL EPITHELIAL, UA: NORMAL /HPF
SEG NEUTROPHILS: 51 % (ref 36–65)
SEGMENTED NEUTROPHILS ABSOLUTE COUNT: 3.38 K/UL (ref 1.5–8.1)
SODIUM BLD-SCNC: 140 MMOL/L (ref 135–144)
SPECIFIC GRAVITY UA: 1.02 (ref 1–1.03)
THYROXINE, FREE: 1.24 NG/DL (ref 0.93–1.7)
TOTAL IRON BINDING CAPACITY: 280 UG/DL (ref 250–450)
TOTAL PROTEIN: 7.1 G/DL (ref 6.4–8.3)
TRICHOMONAS: NORMAL
TSH SERPL DL<=0.05 MIU/L-ACNC: 0.93 MIU/L (ref 0.3–5)
TURBIDITY: ABNORMAL
UNSATURATED IRON BINDING CAPACITY: 190 UG/DL (ref 112–347)
URINE HGB: NEGATIVE
UROBILINOGEN, URINE: NORMAL
VITAMIN B-12: 282 PG/ML (ref 232–1245)
VITAMIN D 25-HYDROXY: 27 NG/ML (ref 30–100)
WBC # BLD: 6.5 K/UL (ref 3.5–11.3)
WBC # BLD: NORMAL 10*3/UL
WBC UA: NORMAL /HPF (ref 0–5)
YEAST: NORMAL

## 2021-04-10 PROCEDURE — 84443 ASSAY THYROID STIM HORMONE: CPT

## 2021-04-10 PROCEDURE — 36415 COLL VENOUS BLD VENIPUNCTURE: CPT

## 2021-04-10 PROCEDURE — 84590 ASSAY OF VITAMIN A: CPT

## 2021-04-10 PROCEDURE — 80053 COMPREHEN METABOLIC PANEL: CPT

## 2021-04-10 PROCEDURE — 81001 URINALYSIS AUTO W/SCOPE: CPT

## 2021-04-10 PROCEDURE — 84630 ASSAY OF ZINC: CPT

## 2021-04-10 PROCEDURE — 83540 ASSAY OF IRON: CPT

## 2021-04-10 PROCEDURE — 85025 COMPLETE CBC W/AUTO DIFF WBC: CPT

## 2021-04-10 PROCEDURE — 82728 ASSAY OF FERRITIN: CPT

## 2021-04-10 PROCEDURE — 83735 ASSAY OF MAGNESIUM: CPT

## 2021-04-10 PROCEDURE — 82607 VITAMIN B-12: CPT

## 2021-04-10 PROCEDURE — 83550 IRON BINDING TEST: CPT

## 2021-04-10 PROCEDURE — 82306 VITAMIN D 25 HYDROXY: CPT

## 2021-04-10 PROCEDURE — 84425 ASSAY OF VITAMIN B-1: CPT

## 2021-04-10 PROCEDURE — 83970 ASSAY OF PARATHORMONE: CPT

## 2021-04-10 PROCEDURE — 83036 HEMOGLOBIN GLYCOSYLATED A1C: CPT

## 2021-04-10 PROCEDURE — 84439 ASSAY OF FREE THYROXINE: CPT

## 2021-04-11 LAB
ESTIMATED AVERAGE GLUCOSE: 111 MG/DL
HBA1C MFR BLD: 5.5 % (ref 4–6)

## 2021-04-11 NOTE — RESULT ENCOUNTER NOTE
Noted. Ordered/address per ordering provider. Bariatric. Available if needed for correlation of care.

## 2021-04-13 LAB — ZINC: 64.2 UG/DL (ref 60–120)

## 2021-04-14 LAB
RETINYL PALMITATE: <0.02 MG/L (ref 0–0.1)
VITAMIN A LEVEL: 0.48 MG/L (ref 0.3–1.2)
VITAMIN A, INTERP: NORMAL

## 2021-04-15 LAB — VITAMIN B1 WHOLE BLOOD: 115 NMOL/L (ref 70–180)

## 2021-04-19 DIAGNOSIS — E55.9 VITAMIN D DEFICIENCY: Primary | ICD-10-CM

## 2021-04-19 RX ORDER — ERGOCALCIFEROL 1.25 MG/1
50000 CAPSULE ORAL WEEKLY
Qty: 8 CAPSULE | Refills: 0 | Status: SHIPPED | OUTPATIENT
Start: 2021-04-19 | End: 2021-12-17 | Stop reason: SDUPTHER

## 2021-04-26 ENCOUNTER — TELEPHONE (OUTPATIENT)
Dept: FAMILY MEDICINE CLINIC | Age: 51
End: 2021-04-26

## 2021-04-27 ENCOUNTER — OFFICE VISIT (OUTPATIENT)
Dept: FAMILY MEDICINE CLINIC | Age: 51
End: 2021-04-27
Payer: COMMERCIAL

## 2021-04-27 VITALS
WEIGHT: 315 LBS | OXYGEN SATURATION: 96 % | DIASTOLIC BLOOD PRESSURE: 64 MMHG | HEART RATE: 64 BPM | BODY MASS INDEX: 50.62 KG/M2 | SYSTOLIC BLOOD PRESSURE: 120 MMHG | HEIGHT: 66 IN

## 2021-04-27 DIAGNOSIS — M54.50 ACUTE MIDLINE LOW BACK PAIN WITHOUT SCIATICA: Primary | ICD-10-CM

## 2021-04-27 DIAGNOSIS — Z74.09 LIMITED MOBILITY: ICD-10-CM

## 2021-04-27 PROCEDURE — 3017F COLORECTAL CA SCREEN DOC REV: CPT | Performed by: NURSE PRACTITIONER

## 2021-04-27 PROCEDURE — 99214 OFFICE O/P EST MOD 30 MIN: CPT | Performed by: NURSE PRACTITIONER

## 2021-04-27 PROCEDURE — 1036F TOBACCO NON-USER: CPT | Performed by: NURSE PRACTITIONER

## 2021-04-27 PROCEDURE — G8427 DOCREV CUR MEDS BY ELIG CLIN: HCPCS | Performed by: NURSE PRACTITIONER

## 2021-04-27 PROCEDURE — G8417 CALC BMI ABV UP PARAM F/U: HCPCS | Performed by: NURSE PRACTITIONER

## 2021-04-27 RX ORDER — CYCLOBENZAPRINE HCL 10 MG
10 TABLET ORAL 3 TIMES DAILY PRN
Qty: 90 TABLET | Refills: 0 | Status: SHIPPED | OUTPATIENT
Start: 2021-04-27 | End: 2021-05-27

## 2021-04-27 NOTE — LETTER
[facility]  [address] [address]  Phone: [phone]  Fax: [phone]    LAMONT Graham NP        April 27, 2021     Patient: Lamonte Thomas   YOB: 1970   Date of Visit: 4/27/2021       To Whom It May Concern: It is my medical opinion that Lamonte Thomas should remain out of work until 05/05/2021. Is suffering from back pain currently. Has been referred to physical therapy, neurosurgery. Prognosis is good. If you have any questions or concerns, please don't hesitate to call.     Sincerely,          LAMONT Graham NP

## 2021-04-27 NOTE — PROGRESS NOTES
Dinesh Jeffries (:  1970) is a 46 y.o. male,Established patient, here for evaluation of the following chief complaint(s):  Back Pain and Follow-Up from Hospital      ASSESSMENT/PLAN:  1. Acute midline low back pain without sciatica  -     Kelly - Alix Obregon, CNP, Neurosurgery, [address] [place] [address],[address] Floor Physical Therapy - [place]  -     cyclobenzaprine (FLEXERIL) 10 MG tablet; Take 1 tablet by mouth 3 times daily as needed for Muscle spasms, Disp-90 tablet, R-0Normal  2. Limited mobility  -     [address] [facility], [address], CNP, Neurosurgery, [place]  -     [facility] Therapy - [place]    Proceed with referral to PT  Proceed with referral to neurosurgery  Encouraged gentle stretching  Avoid lifting, bending, twisting  Monitor for increase in symptoms or loss of bowel/bladder function  Call office with any questions or concerns    Return if symptoms worsen or fail to improve. SUBJECTIVE/OBJECTIVE:  Presents to office today with reports of Back pain for 8-10 days. No injury but does have known herniated discs. Did go to [facility] [place] [facility] yesterday am. Was given meds and sent home. Denies injury or movement that would exacerbate back pain. Pain is the same as yesterday. Has not improved at all. He is visibly uncomfortable sitting in the chair today. Reports normal appetite and drinking fluids. Reports normal bowel and bladder pattern. Sleeping poorly due to pain. He did inquire about seeing a chiropractor; was referred to Dr. Iwona Ott in [place]. Also referred to neurosurgery for further workup. Reports he will need a note for work. 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 Review of Systems   Constitutional: Negative for activity change, appetite change, fatigue and fever. HENT: Negative for congestion, rhinorrhea and sore throat. Eyes: Negative for visual disturbance. Respiratory: Negative for cough and shortness of breath.     Cardiovascular: Negative for chest pain and palpitations. Gastrointestinal: Negative for constipation, diarrhea, nausea and vomiting. Genitourinary: Negative for dysuria and urgency. Musculoskeletal: Positive for arthralgias and back pain. Allergic/Immunologic: Negative for immunocompromised state. Neurological: Negative for syncope, light-headedness and headaches. Psychiatric/Behavioral: Negative for decreased concentration, dysphoric mood, sleep disturbance and suicidal ideas. The patient is not nervous/anxious. Physical Exam  Vitals signs and nursing note reviewed. Constitutional:       General: He is not in acute distress. Appearance: He is not ill-appearing. HENT:      Head: Normocephalic. Nose: Nose normal.      Mouth/Throat:      Lips: Pink. Cardiovascular:      Rate and Rhythm: Normal rate and regular rhythm. Pulses:           Radial pulses are 2+ on the right side and 2+ on the left side. Pulmonary:      Effort: Pulmonary effort is normal. No respiratory distress. Breath sounds: No decreased breath sounds or wheezing. Musculoskeletal:      Lumbar back: He exhibits decreased range of motion, tenderness and edema. Neurological:      Mental Status: He is alert and oriented to person, place, and time. Psychiatric:         Attention and Perception: Attention normal.         Speech: Speech normal.         Behavior: Behavior is cooperative. An electronic signature was used to authenticate this note.     --LAMONT Llamas NP

## 2021-04-30 ENCOUNTER — TELEPHONE (OUTPATIENT)
Dept: FAMILY MEDICINE CLINIC | Age: 51
End: 2021-04-30

## 2021-04-30 DIAGNOSIS — Z74.09 LIMITED MOBILITY: ICD-10-CM

## 2021-04-30 DIAGNOSIS — M54.50 ACUTE MIDLINE LOW BACK PAIN WITHOUT SCIATICA: Primary | ICD-10-CM

## 2021-04-30 NOTE — TELEPHONE ENCOUNTER
Pt called stating insurance will not cover PT referral that was sent but they did approve [facility]. Pt would like new referral sent to [facility] in [facility]. Pt also states he is not able to see Sunny Whelan until 5/13/21 and will need work note excusing him until then. Pt would like called once this is done so he can update his HR.

## 2021-05-10 ASSESSMENT — ENCOUNTER SYMPTOMS
SORE THROAT: 0
NAUSEA: 0
SHORTNESS OF BREATH: 0
CONSTIPATION: 0
COUGH: 0
RHINORRHEA: 0
DIARRHEA: 0
VOMITING: 0
BACK PAIN: 1

## 2021-05-13 ENCOUNTER — OFFICE VISIT (OUTPATIENT)
Dept: NEUROSURGERY | Age: 51
End: 2021-05-13
Payer: COMMERCIAL

## 2021-05-13 ENCOUNTER — TELEPHONE (OUTPATIENT)
Dept: FAMILY MEDICINE CLINIC | Age: 51
End: 2021-05-13

## 2021-05-13 VITALS
BODY MASS INDEX: 50.62 KG/M2 | WEIGHT: 315 LBS | HEIGHT: 66 IN | TEMPERATURE: 96.6 F | HEART RATE: 85 BPM | SYSTOLIC BLOOD PRESSURE: 118 MMHG | OXYGEN SATURATION: 97 % | RESPIRATION RATE: 18 BRPM | DIASTOLIC BLOOD PRESSURE: 68 MMHG

## 2021-05-13 DIAGNOSIS — M47.816 LUMBAR SPONDYLOSIS: Primary | ICD-10-CM

## 2021-05-13 PROCEDURE — G8417 CALC BMI ABV UP PARAM F/U: HCPCS | Performed by: NURSE PRACTITIONER

## 2021-05-13 PROCEDURE — 1036F TOBACCO NON-USER: CPT | Performed by: NURSE PRACTITIONER

## 2021-05-13 PROCEDURE — 99203 OFFICE O/P NEW LOW 30 MIN: CPT | Performed by: NURSE PRACTITIONER

## 2021-05-13 PROCEDURE — G8427 DOCREV CUR MEDS BY ELIG CLIN: HCPCS | Performed by: NURSE PRACTITIONER

## 2021-05-13 PROCEDURE — 3017F COLORECTAL CA SCREEN DOC REV: CPT | Performed by: NURSE PRACTITIONER

## 2021-05-13 RX ORDER — PREDNISONE 20 MG/1
TABLET ORAL
Qty: 30 TABLET | Refills: 0 | Status: SHIPPED | OUTPATIENT
Start: 2021-05-13 | End: 2021-05-28

## 2021-05-13 NOTE — TELEPHONE ENCOUNTER
Notified patient of recommendations Patient stated that his FMLA paperwork should be coming via fax. Will complete form when received.

## 2021-05-13 NOTE — TELEPHONE ENCOUNTER
Additional week off work may be beneficial for continued healing. I am ok with that.  Can return to work on 5/21/2021

## 2021-05-13 NOTE — PROGRESS NOTES
[facility]  [facility] NEUROSURG[facility]  [address] [address]  Dept: [phone]    Patient:  Isak Palomo  YOB: 1970  Date: 5/13/21    The patient is a 46 y.o. male who presents today for consult of the following problems:     Chief Complaint   Patient presents with    New Patient     acute  midline lower back pain          HPI:     Isak Palomo is a 46 y.o. male on whom neurosurgical consultation was requested by No primary care provider on file. for management of back and leg pain. Pt has had back pain since last September. Pain was fairly tolerable until a couple of weeks ago when pain worsened. Pain is 2-3/10 on average, described as a stabbing sensation with certain movements. Denies any numbness or tingling. Occasional pain/tingling down the lateral left thigh with certain sitting positions, can be relieved with repositioning. Denies any saddle anesthesia, loss of bowel or bladder function. Has completed around 6 PT sessions over the last 2 weeks. Does overall appreciate some improvement with PT measures. Has also obtained a lumbar brace, this does provide significant relief. Unable to take NSAIDs secondary to gastric sleeve. Was provided with pain medication and muscle relaxer, this did somewhat help. Groin pain: No  Saddle anesthesia: No  Hip Pain: Yes-left  Bowel/bladder incontinence: No  Constipation or Urinary retention: No    LIMITATIONS    Pain significantly limiting from a daily standpoint. Assistive devices: none  Daily pharmacologic pain control include: flexeril  Back versus leg: mostly back    MANAGEMENT     Prior Surgery: No  Prior to 1yr ago:   In the last year:    Physical Therapy: Yes   Chiropractic Interventions: No   Injections: No  Improvement: n/a    Types of injections/responses: n/a    History:     Past Medical History:   Diagnosis Date    Abnormal stress test 03/2019    Allergic rhinitis     Asthma     Moderate persistent asthma, unspecified whether complicated    Borderline diabetes mellitus     does not take meds regularly    Helicobacter pylori infection 8/31/2020    History of cardiac cath 2019    History of Helicobacter pylori infection     has gone through 4 rounds of treatment, tested negative 10/2019    Hypertension     FP, NP. Christianne Chávez, will be seen on 9/9/2019 pror to spencer on 9/16/2019    Indigestion     Low iron 1/29/2020    Morbid obesity ([address] 10/22/2019    Obesity     TASHA on CPAP     CPAP NIGHTLY, Dr. Iain Merchant Positive H. pylori test 06/2019    Post-nasal drip     S/P laparoscopic sleeve gastrectomy 1/29/2020    Scalp psoriasis 10/24/2012    Sleep apnea 6/28/2016    Sprain, lumbar     Vitamin D deficiency 4/23/2012    Wears glasses      Past Surgical History:   Procedure Laterality Date    CARDIAC CATHETERIZATION  04/17/2019    Cardiologist, Dr. Osorio Blas (no stents)    GASTRIC BYPASS SURGERY  10/22/2019    robotic laparascopic gastric sleeve    HEEL SPUR SURGERY Right 2009    ROTATOR CUFF REPAIR Left 2010    SLEEVE GASTRECTOMY N/A 10/22/2019    XI ROBOTIC LAPAROSCOPIC GASTRIC SLEEVE, EGD- GI UNIT SCHEDULED performed by Pj Montenegro DO at [address] N/A 3/8/2019    EGD BIOPSY performed by Pj Montenegro DO at [facility]     Family History   Problem Relation Age of Onset    Lung Cancer Maternal Grandfather     Broken Bones Paternal Grandmother         hip    Glaucoma Paternal Grandfather     Diabetes Mother     High Cholesterol Mother     Mental Illness Mother     Heart Disease Mother     High Blood Pressure Mother     Depression Mother     Sleep Apnea Mother     Other Mother         GERD    High Cholesterol Father     Diabetes Father     High Blood Pressure Father     Asthma Father     Sleep Apnea Father     Cancer Sister 48        unsure of name, lived 2 weeks after diagnosis     Current Outpatient Medications on File Prior to Visit   Medication Sig Dispense Refill    cyclobenzaprine (FLEXERIL) 10 MG tablet Take 1 tablet by mouth 3 times daily as needed for Muscle spasms 90 tablet 0    vitamin D (ERGOCALCIFEROL) 1.25 MG (81728 UT) CAPS capsule Take 1 capsule by mouth once a week for 8 doses 8 capsule 0    calcium carbonate (TUMS) 500 MG chewable tablet Take 1 tablet by mouth daily      Multiple Vitamins-Minerals (THERAPEUTIC MULTIVITAMIN-MINERALS) tablet Take 1 tablet by mouth daily Indications: celebrate       No current facility-administered medications on file prior to visit. Social History     Tobacco Use    Smoking status: Never Smoker    Smokeless tobacco: Never Used   Substance Use Topics    Alcohol use: Not Currently     Alcohol/week: 0.0 standard drinks     Comment: rarely    Drug use: Never       Allergies   Allergen Reactions    Pcn [Penicillins] Anaphylaxis    Lidocaine      Pt unsure of reaction,  Happened as a child       Review of Systems  Constitutional: Negative for activity change and appetite change. HENT: Negative for ear pain and facial swelling. Eyes: Negative for discharge and itching. Respiratory: Negative for choking and chest tightness. Cardiovascular: Negative for chest pain and leg swelling. Gastrointestinal: Negative for nausea and abdominal pain. Endocrine: Negative for cold intolerance and heat intolerance. Genitourinary: Negative for frequency and flank pain. Musculoskeletal: Negative for myalgias and joint swelling. Skin: Negative for rash and wound. Allergic/Immunologic: Negative for environmental allergies and food allergies. Hematological: Negative for adenopathy. Does not bruise/bleed easily. Psychiatric/Behavioral: Negative for self-injury. The patient is not nervous/anxious.       Physical Exam:      /68   Pulse 85   Temp 96.6 °F (35.9 °C)   Resp 18   Ht 5' 6\" (1.676 m)   Wt (!) 318 lb (144.2 kg)   SpO2 97%   BMI 51.33 kg/m² Estimated body mass index is 51.33 kg/m² as calculated from the following:    Height as of this encounter: 5' 6\" (1.676 m). Weight as of this encounter: 318 lb (144.2 kg). General:  Trinidad Saha is a 46y.o. year old male who appears his stated age. HEENT: Normocephalic atraumatic. Neck supple. Chest: regular rate; pulses equal  Abdomen: Soft nontender nondistended. Ext: DP and PT pulses 2+, good cap refill  Neuro    Mentation  Appropriate affect  Registration intact  Orientation intact    Cranial Nerves:   Pupils equal and reactive to light  Extraocular motion intact  Face and shrug symmetric  Tongue midline  No dysarthria  v1-3 sensation symmetric, masseter tone symmetric  Hearing symmetric and intact    Sensation: Intact    Motor  L deltoid 5/5; R deltoid 5/5  L biceps 5/5; R biceps 5/5  L triceps 5/5; R triceps 5/5  L wrist extension 5/5; R wrist extension 5/5  L intrinsics 5/5; R intrinsics 5/5     L iliopsoas 5/5 , R iliopsoas 5/5  L quadriceps 5/5; R quadriceps 5/5  L Dorsiflexion 5/5; R dorsiflexion 5/5  L Plantarflexion 5/5; R plantarflexion 5/5  L EHL 5/5; R EHL 5/5    Reflexes  L Brachioradialis 2+/4; R brachioradialis 2+/4  L Biceps 2+/4; R Biceps 2+/4  L Triceps 2+/4; R Triceps 2+/4  L Patellar 1+/4: R Patellar 2+/4  L Achilles 2+/4; R Achilles 2+/4    hoffmans L: neg  hoffmans R: neg  Clonus L: neg  Clonus R: neg  Babinski L: neg  Babinski R: neg    ROSEANN: Negative  Straight leg raise: Negative  SI joint tenderness: Negative    Studies Review:     MRI lumbar spine 9/4/2020 (reviewed):   FINDINGS:   BONES/ALIGNMENT: Vertebral body heights are maintained.  There is   age-appropriate bone marrow signal.  There is multilevel degenerative disc   disease with loss of disc signal.  There is no disc space narrowing.  There   is no spondylolisthesis.       SPINAL CORD: The conus medullaris is normal in caliber and signal and   terminates at the L1 level.  The cauda equina is unremarkable.     SOFT TISSUES: The posterior paraspinal soft tissues are unremarkable.  The   visualized abdominal structures are unremarkable.       L1-L2: There is a circumferential disc bulge with facet hypertrophy.  There   is canal stenosis measuring 8 mm in AP dimension.  There is mild bilateral   foraminal narrowing.       L2-L3: There is a circumferential disc bulge with facet and ligamentous   hypertrophy.  There is canal stenosis measuring 9 mm in AP dimension.  There   is mild-to-moderate bilateral foraminal narrowing.       L3-L4: There is a circumferential disc bulge with facet and ligamentous   hypertrophy.  There is canal stenosis measuring 8 mm in AP dimension.  There   is moderate to severe bilateral foraminal narrowing.       L4-L5: There is a circumferential disc bulge with facet and ligamentous   hypertrophy.  There is no canal stenosis.  There is moderate bilateral   foraminal narrowing.       L5-S1: There is a circumferential disc bulge with facet hypertrophy.  There   is no canal stenosis or foraminal narrowing. X-ray lumbar spine 8/28/2020: The vertebral body heights are maintained. Multilevel loss of intervertebral disc space height most notably at L4-5 and L5-S1. Hypertrophic changes are present in the facets. Endplate osteophytes are noted. The overlying soft tissues are unremarkable. The pedicles are intact. Assessment and Plan:      1. Lumbar spondylosis    2. BMI 50.0-59.9, adult [facility])          Plan: Patient with approximately 1 year history of low back pain. Symptoms started after undergoing gastric sleeve and losing approximately 140 pounds. Pain is predominantly axial in nature, has been exacerbated over the last 2 weeks. Recently initiated physical therapy, has completed approximately 2 weeks with some improvement thus far. No sensorimotor deficits present on exam.  No saddle anesthesia, loss of bowel or bladder function. Does have multilevel degenerative changes on previous MRI. At this point recommend continued physical therapy course, will provide prednisone taper. Can continue lumbar brace as this is helpful. Will plan on seeing the patient back in 8 to 10 weeks, if symptoms persist at that time, will recommend pain management evaluation. Followup: Return in about 10 weeks (around 7/22/2021), or if symptoms worsen or fail to improve. Prescriptions Ordered:  Orders Placed This Encounter   Medications    predniSONE (DELTASONE) 20 MG tablet     Sig: Take 3 tablets PO daily for first 5 days, then take 2 tablets PO daily for next 5 days, then take 1 tablet PO daily for last 5 days     Dispense:  30 tablet     Refill:  0        Orders Placed:  No orders of the defined types were placed in this encounter. Electronically signed by LAMONT Lewis CNP on 5/13/2021 at 9:02 AM    Please note that this chart was generated using voice recognition Dragon dictation software. Although every effort was made to ensure the accuracy of this automated transcription, some errors in transcription may have occurred.

## 2021-05-13 NOTE — TELEPHONE ENCOUNTER
Patient had an appt with NS 05/13/2021. They advised him to contact pcp if he felt he needed more time off work. Currently RONALD Estevez has him returning to work 05/14/2021.   He is inquiring if he should be off an additional week??  NS note is in [employer]

## 2021-06-15 ENCOUNTER — OFFICE VISIT (OUTPATIENT)
Dept: BARIATRICS/WEIGHT MGMT | Age: 51
End: 2021-06-15
Payer: COMMERCIAL

## 2021-06-15 VITALS
BODY MASS INDEX: 50.62 KG/M2 | HEART RATE: 78 BPM | WEIGHT: 315 LBS | DIASTOLIC BLOOD PRESSURE: 80 MMHG | HEIGHT: 66 IN | SYSTOLIC BLOOD PRESSURE: 130 MMHG

## 2021-06-15 DIAGNOSIS — J45.909 UNCOMPLICATED ASTHMA, UNSPECIFIED ASTHMA SEVERITY, UNSPECIFIED WHETHER PERSISTENT: ICD-10-CM

## 2021-06-15 DIAGNOSIS — Z98.84 S/P LAPAROSCOPIC SLEEVE GASTRECTOMY: ICD-10-CM

## 2021-06-15 DIAGNOSIS — E66.01 MORBID OBESITY (HCC): ICD-10-CM

## 2021-06-15 DIAGNOSIS — G47.33 OBSTRUCTIVE SLEEP APNEA SYNDROME: Primary | ICD-10-CM

## 2021-06-15 DIAGNOSIS — E61.1 LOW IRON: ICD-10-CM

## 2021-06-15 PROCEDURE — 1036F TOBACCO NON-USER: CPT | Performed by: NURSE PRACTITIONER

## 2021-06-15 PROCEDURE — 3017F COLORECTAL CA SCREEN DOC REV: CPT | Performed by: NURSE PRACTITIONER

## 2021-06-15 PROCEDURE — G8417 CALC BMI ABV UP PARAM F/U: HCPCS | Performed by: NURSE PRACTITIONER

## 2021-06-15 PROCEDURE — G8427 DOCREV CUR MEDS BY ELIG CLIN: HCPCS | Performed by: NURSE PRACTITIONER

## 2021-06-15 PROCEDURE — 99213 OFFICE O/P EST LOW 20 MIN: CPT | Performed by: NURSE PRACTITIONER

## 2021-06-15 NOTE — PROGRESS NOTES
Post-op Bariatric Surgery Note    Subjective     Patient is 20 months s/p laparoscopic sleeve gastrectomy, down 100 lbs. Overall, doing well. Incisions well healed. Consistent use of MVI and calcium. Physical activity includes walking. Injured his back and was on a course of prednisone and activity was limited and regained 19 lbs. No pain or other specific problems or concerns. Allergies: Allergies   Allergen Reactions    Pcn [Penicillins] Anaphylaxis    Lidocaine      Pt unsure of reaction,  Happened as a child       Past Medical History:     Past Medical History:   Diagnosis Date    Abnormal stress test 03/2019    Allergic rhinitis     Asthma     Moderate persistent asthma, unspecified whether complicated    Borderline diabetes mellitus     does not take meds regularly    Helicobacter pylori infection 8/31/2020    History of cardiac cath 2019    History of Helicobacter pylori infection     has gone through 4 rounds of treatment, tested negative 10/2019    Hypertension     FP, NP. Otto Calvin, will be seen on 9/9/2019 pror to spencer on 9/16/2019    Indigestion     Low iron 1/29/2020    Morbid obesity ([address] 10/22/2019    Obesity     TASHA on CPAP     CPAP NIGHTLY, Dr. Sharon Wheeler Positive H. pylori test 06/2019    Post-nasal drip     S/P laparoscopic sleeve gastrectomy 1/29/2020    Scalp psoriasis 10/24/2012    Sleep apnea 6/28/2016    Sprain, lumbar     Vitamin D deficiency 4/23/2012    Wears glasses    .     Past Surgical History:  Past Surgical History:   Procedure Laterality Date    CARDIAC CATHETERIZATION  04/17/2019    Cardiologist, Dr. Telles Records (no stents)    GASTRIC BYPASS SURGERY  10/22/2019    robotic laparascopic gastric sleeve    HEEL SPUR SURGERY Right 2009    ROTATOR CUFF REPAIR Left 2010    SLEEVE GASTRECTOMY N/A 10/22/2019    XI ROBOTIC LAPAROSCOPIC GASTRIC SLEEVE, EGD- GI UNIT SCHEDULED performed by Tate Chris DO at [place] [address] GASTROINTESTINAL ENDOSCOPY N/A 3/8/2019    EGD BIOPSY performed by Michael Greene DO at [facility]       Family History:  Family History   Problem Relation Age of Onset    Lung Cancer Maternal Grandfather     Broken Bones Paternal Grandmother         hip    Glaucoma Paternal Grandfather     Diabetes Mother     High Cholesterol Mother     Mental Illness Mother     Heart Disease Mother     High Blood Pressure Mother     Depression Mother     Sleep Apnea Mother     Other Mother         GERD    High Cholesterol Father     Diabetes Father     High Blood Pressure Father     Asthma Father     Sleep Apnea Father    Del Henle Cancer Sister 48        unsure of name, lived 2 weeks after diagnosis       Social History:  Social History     Socioeconomic History    Marital status: Legally      Spouse name: Not on file    Number of children: 0    Years of education: Not on file    Highest education level: Not on file   Occupational History    Not on file   Tobacco Use    Smoking status: Never Smoker    Smokeless tobacco: Never Used   Vaping Use    Vaping Use: Never used   Substance and Sexual Activity    Alcohol use: Not Currently     Alcohol/week: 0.0 standard drinks     Comment: rarely    Drug use: Never    Sexual activity: Not Currently     Partners: Female     Comment: girlfriend had tubal   Other Topics Concern    Not on file   Social History Narrative    Not on file     Social Determinants of Health     Financial Resource Strain:     Difficulty of Paying Living Expenses:    Food Insecurity:     Worried About Running Out of Food in the Last Year:     Ran Out of Food in the Last Year:    Transportation Needs:     Lack of Transportation (Medical):      Lack of Transportation (Non-Medical):    Physical Activity:     Days of Exercise per Week:     Minutes of Exercise per Session:    Stress:     Feeling of Stress :    Social Connections:     Frequency of Communication with Friends and Family:     Frequency of Social Gatherings with Friends and Family:     Attends Christianity Services:     Active Member of Clubs or Organizations:     Attends Club or Organization Meetings:     Marital Status:    Intimate Partner Violence:     Fear of Current or Ex-Partner:     Emotionally Abused:     Physically Abused:     Sexually Abused:        Current Medications:  Current Outpatient Medications   Medication Sig Dispense Refill    calcium carbonate (TUMS) 500 MG chewable tablet Take 1 tablet by mouth daily      Multiple Vitamins-Minerals (THERAPEUTIC MULTIVITAMIN-MINERALS) tablet Take 1 tablet by mouth daily Indications: celebrate      vitamin D (ERGOCALCIFEROL) 1.25 MG (52226 UT) CAPS capsule Take 1 capsule by mouth once a week for 8 doses 8 capsule 0     No current facility-administered medications for this visit. Vital Signs:  /80 (Site: Right Upper Arm, Position: Sitting, Cuff Size: Large Adult)   Pulse 78   Ht 5' 5.5\" (1.664 m)   Wt (!) 325 lb (147.4 kg)   BMI 53.26 kg/m²     BMI/Height/Weight:  Body mass index is 53.26 kg/m². Review of Systems - A review of systems was performed. All was negative unless otherwise documented in HPI. Constitutional: Negative for fever, chills and diaphoresis. HENT: Negative for hearing loss and trouble swallowing. Eyes: Negative for photophobia and visual disturbance. Respiratory: Negative for cough, shortness of breath and wheezing. Cardiovascular: Negative for chest pain and palpitations. Gastrointestinal: Negative for nausea, vomiting, abdominal pain, diarrhea, constipation, blood in stool and abdominal distention. Endocrine: Negative for polydipsia, polyphagia and polyuria. Genitourinary: Negative for dysuria, frequency, hematuria and difficulty urinating. Musculoskeletal: Negative for myalgias, joint swelling. Skin: Negative for pallor and rash.    Neurological: Negative for dizziness, tremors, light-headedness and headaches. Psychiatric/Behavioral: Negative for sleep disturbance and dysphoric mood. Objective:      Physical Exam   Vital signs reviewed. General: Well-developed and well-nourished. No acute distress. Skin: Warm, dry and intact. HEENT: Normocephalic. EOMs intact. Conjunctivae normal. Neck supple. Cardiovascular: Normal rate, regular rhythm. Pulmonary/Chest: Normal effort. Lungs clear to auscultation. No rales, rhonchi or wheezing. Abdominal: Positive bowel sounds. Soft, nontender. Nondistended. No rigidity, rebound, or guarding. Musculoskeletal: Movement x4. No edema. Neurological: Gait normal. Alert and oriented to person, place, and time. Psychiatric: Normal mood and affect. Speech and behavior normal. Judgment and thought content normal. Cognition and memory intact. Assessment:       Diagnosis Orders   1. Obstructive sleep apnea syndrome  Comprehensive Metabolic Panel    TSH without Reflex    T4, Free    Hemoglobin A1C    Vitamin B12 & Folate    Vitamin B1    Vitamin D 25 Hydroxy    Magnesium    Iron and TIBC    Vitamin A    Lipid Panel    PTH, Intact    CBC Auto Differential    Zinc    Ferritin   2. Low iron  Comprehensive Metabolic Panel    TSH without Reflex    T4, Free    Hemoglobin A1C    Vitamin B12 & Folate    Vitamin B1    Vitamin D 25 Hydroxy    Magnesium    Iron and TIBC    Vitamin A    Lipid Panel    PTH, Intact    CBC Auto Differential    Zinc    Ferritin   3. S/P laparoscopic sleeve gastrectomy  Comprehensive Metabolic Panel    TSH without Reflex    T4, Free    Hemoglobin A1C    Vitamin B12 & Folate    Vitamin B1    Vitamin D 25 Hydroxy    Magnesium    Iron and TIBC    Vitamin A    Lipid Panel    PTH, Intact    CBC Auto Differential    Zinc    Ferritin   4.  Uncomplicated asthma, unspecified asthma severity, unspecified whether persistent  Comprehensive Metabolic Panel    TSH without Reflex    T4, Free    Hemoglobin A1C    Vitamin B12 & Folate    Vitamin B1    Vitamin D 25 hrs    PTH, Intact     Standing Status:   Future     Standing Expiration Date:   6/15/2022    CBC Auto Differential     Standing Status:   Future     Standing Expiration Date:   6/15/2022    Zinc     Standing Status:   Future     Standing Expiration Date:   6/16/2022    Ferritin     Standing Status:   Future     Standing Expiration Date:   6/16/2022       Prescriptions this encounter:  No orders of the defined types were placed in this encounter.       Electronically signed by:  Prabha Garduno CNP

## 2021-06-16 ENCOUNTER — TELEPHONE (OUTPATIENT)
Dept: BARIATRICS/WEIGHT MGMT | Age: 51
End: 2021-06-16

## 2021-06-16 NOTE — TELEPHONE ENCOUNTER
I looked to see if he used to be on a diuretic and I don't see any in his record. If there is any question about a \"water pill\", that would be something his PCP would manage anyway. I don't see any current medical reason for him to be on a diuretic at this time. Perhaps he misunderstood something in our conversation.

## 2021-06-16 NOTE — TELEPHONE ENCOUNTER
No, there is no need for a \"water pill\". He does not have edema. Not sure what he is referring to.

## 2021-06-16 NOTE — TELEPHONE ENCOUNTER
Pt states that Efra Vaughan mentioned a \"water pill\" yesterday during his visit. Nothing was prescribed per patients chart.  Pt would like to know if this is something Efra Vaughan is going to prescribe

## 2021-06-18 ENCOUNTER — TELEPHONE (OUTPATIENT)
Dept: BARIATRICS/WEIGHT MGMT | Age: 51
End: 2021-06-18

## 2021-07-23 ENCOUNTER — OFFICE VISIT (OUTPATIENT)
Dept: NEUROSURGERY | Age: 51
End: 2021-07-23
Payer: COMMERCIAL

## 2021-07-23 VITALS — HEART RATE: 73 BPM | RESPIRATION RATE: 16 BRPM | DIASTOLIC BLOOD PRESSURE: 83 MMHG | SYSTOLIC BLOOD PRESSURE: 134 MMHG

## 2021-07-23 DIAGNOSIS — M47.816 LUMBAR SPONDYLOSIS: Primary | ICD-10-CM

## 2021-07-23 PROCEDURE — G8427 DOCREV CUR MEDS BY ELIG CLIN: HCPCS | Performed by: NURSE PRACTITIONER

## 2021-07-23 PROCEDURE — 99213 OFFICE O/P EST LOW 20 MIN: CPT | Performed by: NURSE PRACTITIONER

## 2021-07-23 PROCEDURE — G8417 CALC BMI ABV UP PARAM F/U: HCPCS | Performed by: NURSE PRACTITIONER

## 2021-07-23 PROCEDURE — 1036F TOBACCO NON-USER: CPT | Performed by: NURSE PRACTITIONER

## 2021-07-23 PROCEDURE — 3017F COLORECTAL CA SCREEN DOC REV: CPT | Performed by: NURSE PRACTITIONER

## 2021-07-23 NOTE — PROGRESS NOTES
Isabel Ayers  [facility] [address] [facility] [address]  Dept: [phone]    Patient:  Katharine Christie  YOB: 1970  Date: 5/13/21    The patient is a 46 y.o. male who presents today for consult of the following problems:     Chief Complaint   Patient presents with    Follow-up         HPI:     Katharine Christie is a 46 y.o. male on whom neurosurgical consultation was requested by LAMONT Mijares NP for management of back and leg pain. Pt has had back pain since last September. Pain was fairly tolerable until a couple of weeks ago when pain worsened. Pain is 2-3/10 on average, described as a stabbing sensation with certain movements. Denies any numbness or tingling. Occasional pain/tingling down the lateral left thigh with certain sitting positions, can be relieved with repositioning. Denies any saddle anesthesia, loss of bowel or bladder function. Has completed around 6 PT sessions over the last 2 weeks. Does overall appreciate some improvement with PT measures. Has also obtained a lumbar brace, this does provide significant relief. Unable to take NSAIDs secondary to gastric sleeve. Was provided with pain medication and muscle relaxer, this did somewhat help.     7/23/2021:  Has greatly improved overall following physical therapy measures. Patient reports some mild aching sensation to his lower back when he first wakes up, is able to get up move around and this improves. Denies any pain presently. Denies any numbness tingling or radiating symptoms into lower extremities. No saddle anesthesia, changes to bowels or bladder. Has done well with physical therapy, is nearing completion of course and has met goals. Groin pain: No  Saddle anesthesia: No  Hip Pain: Yes-left  Bowel/bladder incontinence: No  Constipation or Urinary retention: No    LIMITATIONS    Pain significantly limiting from a daily standpoint.    Assistive devices: none  Daily pharmacologic pain control include: flexeril  Back versus leg: mostly back    MANAGEMENT     Prior Surgery: No  Prior to 1yr ago:   In the last year:    Physical Therapy: Yes   Chiropractic Interventions: No   Injections: No  Improvement: n/a    Types of injections/responses: n/a    History:     Past Medical History:   Diagnosis Date    Abnormal stress test 03/2019    Allergic rhinitis     Asthma     Moderate persistent asthma, unspecified whether complicated    Borderline diabetes mellitus     does not take meds regularly    Helicobacter pylori infection 8/31/2020    History of cardiac cath 2019    History of Helicobacter pylori infection     has gone through 4 rounds of treatment, tested negative 10/2019    Hypertension     FP, NP. Laine Marks, will be seen on 9/9/2019 pror to specner on 9/16/2019    Indigestion     Low iron 1/29/2020    Morbid obesity ([address] 10/22/2019    Obesity     TASHA on CPAP     CPAP NIGHTLY, Dr. Jojo Cobb Positive H. pylori test 06/2019    Post-nasal drip     S/P laparoscopic sleeve gastrectomy 1/29/2020    Scalp psoriasis 10/24/2012    Sleep apnea 6/28/2016    Sprain, lumbar     Vitamin D deficiency 4/23/2012    Wears glasses      Past Surgical History:   Procedure Laterality Date    CARDIAC CATHETERIZATION  04/17/2019    Cardiologist, Dr. Francia Mirza (no stents)    GASTRIC BYPASS SURGERY  10/22/2019    robotic laparascopic gastric sleeve    HEEL SPUR SURGERY Right 2009    ROTATOR CUFF REPAIR Left 2010    SLEEVE GASTRECTOMY N/A 10/22/2019    XI ROBOTIC LAPAROSCOPIC GASTRIC SLEEVE, EGD- GI UNIT SCHEDULED performed by Moreno Velasquez DO at [facility] N/A 3/8/2019    EGD BIOPSY performed by Moreno Velasquez DO at [facility]     Family History   Problem Relation Age of Onset    Lung Cancer Maternal Grandfather     Broken Bones Paternal Grandmother         hip    Glaucoma Paternal Jaziel Peers Diabetes Mother  High Cholesterol Mother     Mental Illness Mother     Heart Disease Mother     High Blood Pressure Mother     Depression Mother     Sleep Apnea Mother     Other Mother         GERD    High Cholesterol Father     Diabetes Father     High Blood Pressure Father     Asthma Father     Sleep Apnea Father    Gabriela Sesay Cancer Sister 48        unsure of name, lived 2 weeks after diagnosis     Current Outpatient Medications on File Prior to Visit   Medication Sig Dispense Refill    vitamin D (ERGOCALCIFEROL) 1.25 MG (18776 UT) CAPS capsule Take 1 capsule by mouth once a week for 8 doses 8 capsule 0    calcium carbonate (TUMS) 500 MG chewable tablet Take 1 tablet by mouth daily      Multiple Vitamins-Minerals (THERAPEUTIC MULTIVITAMIN-MINERALS) tablet Take 1 tablet by mouth daily Indications: celebrate       No current facility-administered medications on file prior to visit. Social History     Tobacco Use    Smoking status: Never Smoker    Smokeless tobacco: Never Used   Vaping Use    Vaping Use: Never used   Substance Use Topics    Alcohol use: Not Currently     Alcohol/week: 0.0 standard drinks     Comment: rarely    Drug use: Never       Allergies   Allergen Reactions    Pcn [Penicillins] Anaphylaxis    Lidocaine      Pt unsure of reaction,  Happened as a child       Review of Systems  Constitutional: Negative for activity change and appetite change. HENT: Negative for ear pain and facial swelling. Eyes: Negative for discharge and itching. Respiratory: Negative for choking and chest tightness. Cardiovascular: Negative for chest pain and leg swelling. Gastrointestinal: Negative for nausea and abdominal pain. Endocrine: Negative for cold intolerance and heat intolerance. Genitourinary: Negative for frequency and flank pain. Musculoskeletal: Negative for myalgias and joint swelling. Skin: Negative for rash and wound.    Allergic/Immunologic: Negative for environmental allergies and food allergies. Hematological: Negative for adenopathy. Does not bruise/bleed easily. Psychiatric/Behavioral: Negative for self-injury. The patient is not nervous/anxious. Physical Exam:      /83 (Site: Right Upper Arm, Position: Sitting, Cuff Size: Large Adult)   Pulse 73   Resp 16   Estimated body mass index is 53.26 kg/m² as calculated from the following:    Height as of 6/15/21: 5' 5.5\" (1.664 m). Weight as of 6/15/21: 325 lb (147.4 kg). General:  Maru Garcia is a 46y.o. year old male who appears his stated age. HEENT: Normocephalic atraumatic. Neck supple. Chest: regular rate; pulses equal  Abdomen: Soft nontender nondistended. Ext: DP and PT pulses 2+, good cap refill  Neuro    Mentation  Appropriate affect  Registration intact  Orientation intact    Cranial Nerves:   Pupils equal and reactive to light  Extraocular motion intact  Face and shrug symmetric  Tongue midline  No dysarthria  v1-3 sensation symmetric, masseter tone symmetric  Hearing symmetric and intact    Sensation: Intact    Motor  L deltoid 5/5; R deltoid 5/5  L biceps 5/5; R biceps 5/5  L triceps 5/5; R triceps 5/5  L wrist extension 5/5; R wrist extension 5/5  L intrinsics 5/5; R intrinsics 5/5     L iliopsoas 5/5 , R iliopsoas 5/5  L quadriceps 5/5; R quadriceps 5/5  L Dorsiflexion 5/5; R dorsiflexion 5/5  L Plantarflexion 5/5; R plantarflexion 5/5  L EHL 5/5; R EHL 5/5    Reflexes  L Brachioradialis 2+/4; R brachioradialis 2+/4  L Biceps 2+/4; R Biceps 2+/4  L Triceps 2+/4; R Triceps 2+/4  L Patellar 1+/4: R Patellar 2+/4  L Achilles 2+/4; R Achilles 2+/4    hoffmans L: neg  hoffmans R: neg  Clonus L: neg  Clonus R: neg  Babinski L: neg  Babinski R: neg    ROSEANN: Negative  Straight leg raise: Negative  SI joint tenderness: Negative    Studies Review:     MRI lumbar spine 9/4/2020 (reviewed):   FINDINGS:   BONES/ALIGNMENT: Vertebral body heights are maintained.  There is   age-appropriate bone marrow signal.  There is multilevel degenerative disc   disease with loss of disc signal. Gideon Luke is no disc space narrowing.  There   is no spondylolisthesis.       SPINAL CORD: The conus medullaris is normal in caliber and signal and   terminates at the L1 level.  The cauda equina is unremarkable.       SOFT TISSUES: The posterior paraspinal soft tissues are unremarkable.  The   visualized abdominal structures are unremarkable.       L1-L2: There is a circumferential disc bulge with facet hypertrophy.  There   is canal stenosis measuring 8 mm in AP dimension.  There is mild bilateral   foraminal narrowing.       L2-L3: There is a circumferential disc bulge with facet and ligamentous   hypertrophy.  There is canal stenosis measuring 9 mm in AP dimension.  There   is mild-to-moderate bilateral foraminal narrowing.       L3-L4: There is a circumferential disc bulge with facet and ligamentous   hypertrophy.  There is canal stenosis measuring 8 mm in AP dimension.  There   is moderate to severe bilateral foraminal narrowing.       L4-L5: There is a circumferential disc bulge with facet and ligamentous   hypertrophy.  There is no canal stenosis.  There is moderate bilateral   foraminal narrowing.       L5-S1: There is a circumferential disc bulge with facet hypertrophy.  There   is no canal stenosis or foraminal narrowing. Physical therapy notes reviewed    Assessment and Plan:      1. Lumbar spondylosis          Plan: Patient with approximately 1 year history of low back pain. Symptoms started after undergoing gastric sleeve and losing approximately 140 pounds. Pain is predominantly axial in nature. Has completed course of physical therapy, does report significant improvement in symptoms. Denies claudication or radicular symptoms. Recommend following up on an as-needed basis. Should axial lumbar pain return, would recommend seeking pain management referral from PCP for interventions.   Should the patient develop any numbness tingling weakness to lower extremities, saddle anesthesia, changes to bowel/bladder, would recommend follow-up here in the office. Followup: Return if symptoms worsen or fail to improve. Prescriptions Ordered:  No orders of the defined types were placed in this encounter. Orders Placed:  No orders of the defined types were placed in this encounter. Electronically signed by LAMONT Rico CNP on 7/23/2021 at 10:52 AM    Please note that this chart was generated using voice recognition Dragon dictation software. Although every effort was made to ensure the accuracy of this automated transcription, some errors in transcription may have occurred.

## 2021-09-01 ENCOUNTER — NURSE TRIAGE (OUTPATIENT)
Dept: OTHER | Facility: CLINIC | Age: 51
End: 2021-09-01

## 2021-09-01 NOTE — TELEPHONE ENCOUNTER
Received call from Veronika Valdes at [facility] with [employer]. Brief description of triage: leg swelling    Triage indicates for patient to be seen in er/ucc now. Patient agreed. Care advice provided, patient verbalizes understanding; denies any other questions or concerns; instructed to call back for any new or worsening symptoms. Attention Provider: Thank you for allowing me to participate in the care of your patient. The patient was connected to triage in response to information provided to the ECC. Please do not respond through this encounter as the response is not directed to a shared pool. Reason for Disposition   Thigh or calf pain and only 1 side and present > 1 hour    Answer Assessment - Initial Assessment Questions  1. ONSET: \"When did the swelling start? \" (e.g., minutes, hours, days)      Yesterday    2. LOCATION: \"What part of the leg is swollen? \"  \"Are both legs swollen or just one leg? \"   calf, left leg    3. SEVERITY: \"How bad is the swelling? \" (e.g., localized; mild, moderate, severe)   - Localized - small area of swelling localized to one leg   - MILD pedal edema - swelling limited to foot and ankle, pitting edema < 1/4 inch (6 mm) deep, rest and elevation eliminate most or all swelling   - MODERATE edema - swelling of lower leg to knee, pitting edema > 1/4 inch (6 mm) deep, rest and elevation only partially reduce swelling   - SEVERE edema - swelling extends above knee, facial or hand swelling present      Moderate    4. REDNESS: \"Does the swelling look red or infected? \"    Yes    5. PAIN: \"Is the swelling painful to touch? \" If so, ask: \"How painful is it? \"   (Scale 1-10; mild, moderate or severe)  Pain=7 when walking    6. FEVER: \"Do you have a fever? \" If so, ask: \"What is it, how was it measured, and when did it start? \"   No    7. CAUSE: \"What do you think is causing the leg swelling? \"  unsure    8.  MEDICAL HISTORY: \"Do you have a history of heart failure, kidney disease, liver failure, or cancer? \"  No    9. RECURRENT SYMPTOM: \"Have you had leg swelling before? \" If so, ask: \"When was the last time? \" \"What happened that time? \"  No    10. OTHER SYMPTOMS: \"Do you have any other symptoms? \" (e.g., chest pain, difficulty breathing)     No    11. PREGNANCY: \"Is there any chance you are pregnant? \" \"When was your last menstrual period? \"      male    Protocols used: LEG SWELLING AND EDEMA-ADULT-OH

## 2021-09-09 ENCOUNTER — TELEPHONE (OUTPATIENT)
Dept: FAMILY MEDICINE CLINIC | Age: 51
End: 2021-09-09

## 2021-09-09 NOTE — TELEPHONE ENCOUNTER
[facility] Trans[facility] Initial Follow Up Call    Outreach made within 2 business days of discharge: Yes    Patient: Maru Garcia Patient : 1970   MRN: [id number]  Reason for Admission: There are no discharge diagnoses documented for the most recent discharge. Discharge Date: 10/23/19       Spoke with: Jamilah Sanders     Discharge department/facility: [facility] Interactive Patient Contact:  Was patient able to fill all prescriptions: Yes  Was patient instructed to bring all medications to the follow-up visit: Yes  Is patient taking all medications as directed in the discharge summary?  Yes  Does patient understand their discharge instructions: Yes  Does patient have questions or concerns that need addressed prior to 7-14 day follow up office visit: no    Scheduled appointment with PCP within 7-14 days    Follow Up  Future Appointments   Date Time Provider Betty Garcia   9/10/2021  8:30 AM LAMONT Sparrow NP [facility]   10/4/2021 11:00 AM Laurie Melody Fei, APRN - CNP [facility]   2021  4:30 PM LAMONT Small CNP Weight Mgmt [facility] LPN

## 2021-09-10 ENCOUNTER — OFFICE VISIT (OUTPATIENT)
Dept: FAMILY MEDICINE CLINIC | Age: 51
End: 2021-09-10
Payer: COMMERCIAL

## 2021-09-10 VITALS
DIASTOLIC BLOOD PRESSURE: 70 MMHG | HEART RATE: 83 BPM | BODY MASS INDEX: 49.98 KG/M2 | SYSTOLIC BLOOD PRESSURE: 115 MMHG | RESPIRATION RATE: 14 BRPM | TEMPERATURE: 93.8 F | OXYGEN SATURATION: 98 % | WEIGHT: 311 LBS | HEIGHT: 66 IN

## 2021-09-10 DIAGNOSIS — L03.116 CELLULITIS OF LEFT LOWER EXTREMITY: Primary | ICD-10-CM

## 2021-09-10 PROCEDURE — G8427 DOCREV CUR MEDS BY ELIG CLIN: HCPCS | Performed by: NURSE PRACTITIONER

## 2021-09-10 PROCEDURE — 1036F TOBACCO NON-USER: CPT | Performed by: NURSE PRACTITIONER

## 2021-09-10 PROCEDURE — 3017F COLORECTAL CA SCREEN DOC REV: CPT | Performed by: NURSE PRACTITIONER

## 2021-09-10 PROCEDURE — G8417 CALC BMI ABV UP PARAM F/U: HCPCS | Performed by: NURSE PRACTITIONER

## 2021-09-10 PROCEDURE — 99214 OFFICE O/P EST MOD 30 MIN: CPT | Performed by: NURSE PRACTITIONER

## 2021-09-10 RX ORDER — CEPHALEXIN 500 MG/1
CAPSULE ORAL
COMMUNITY
Start: 2021-09-07 | End: 2021-09-13 | Stop reason: SDUPTHER

## 2021-09-10 RX ORDER — DOXYCYCLINE HYCLATE 100 MG/1
CAPSULE ORAL
COMMUNITY
Start: 2021-09-07 | End: 2021-09-13 | Stop reason: SDUPTHER

## 2021-09-10 SDOH — ECONOMIC STABILITY: FOOD INSECURITY: WITHIN THE PAST 12 MONTHS, THE FOOD YOU BOUGHT JUST DIDN'T LAST AND YOU DIDN'T HAVE MONEY TO GET MORE.: NEVER TRUE

## 2021-09-10 SDOH — ECONOMIC STABILITY: FOOD INSECURITY: WITHIN THE PAST 12 MONTHS, YOU WORRIED THAT YOUR FOOD WOULD RUN OUT BEFORE YOU GOT MONEY TO BUY MORE.: NEVER TRUE

## 2021-09-10 ASSESSMENT — PATIENT HEALTH QUESTIONNAIRE - PHQ9
SUM OF ALL RESPONSES TO PHQ9 QUESTIONS 1 & 2: 0
SUM OF ALL RESPONSES TO PHQ QUESTIONS 1-9: 0
SUM OF ALL RESPONSES TO PHQ QUESTIONS 1-9: 0
1. LITTLE INTEREST OR PLEASURE IN DOING THINGS: 0
2. FEELING DOWN, DEPRESSED OR HOPELESS: 0
SUM OF ALL RESPONSES TO PHQ QUESTIONS 1-9: 0

## 2021-09-10 ASSESSMENT — SOCIAL DETERMINANTS OF HEALTH (SDOH): HOW HARD IS IT FOR YOU TO PAY FOR THE VERY BASICS LIKE FOOD, HOUSING, MEDICAL CARE, AND HEATING?: NOT HARD AT ALL

## 2021-09-10 NOTE — PROGRESS NOTES
Post-Discharge Transitional Care Management Services or Hospital Follow Up      Opalstefan Uribe   YOB: 1970    Date of Office Visit:  9/10/2021  Date of Hospital Admission: 9/1/2021  Date of Hospital Discharge: 9/7/2021    Care management risk score Rising risk (score 2-5) and Complex Care (Scores >=6): 5     Non face to face  following discharge, date last encounter closed (first attempt may have been earlier): 9/9/2021  4:07 PM 9/9/2021  4:07 PM    Call initiated 2 business days of discharge: Yes    Patient Active Problem List   Diagnosis    Vitamin D deficiency    Scalp psoriasis    Sleep apnea    Asthma    Indigestion    Morbid obesity ([facility] [address]    Low iron    S/P laparoscopic sleeve gastrectomy       Allergies   Allergen Reactions    Pcn [Penicillins] Anaphylaxis    Lidocaine      Pt unsure of reaction,  Happened as a child       Medications listed as ordered at the time of discharge from hospital: Keflex 500 mg, doxycycline hyclate 100 mg       Medications marked \"taking\" at this time  Outpatient Medications Marked as Taking for the 9/10/21 encounter (Office Visit) with LAMONT Falcon - NP   Medication Sig Dispense Refill    [DISCONTINUED] cephALEXin (KEFLEX) 500 MG capsule take 1 capsule by mouth every 12 hours for 5 days      [DISCONTINUED] doxycycline hyclate (VIBRAMYCIN) 100 MG capsule take 1 capsule by mouth twice a day for 5 days      calcium carbonate (TUMS) 500 MG chewable tablet Take 1 tablet by mouth daily      Multiple Vitamins-Minerals (THERAPEUTIC MULTIVITAMIN-MINERALS) tablet Take 1 tablet by mouth daily Indications: celebrate          Medications patient taking as of now reconciled against medications ordered at time of hospital discharge: Yes    Chief Complaint   Patient presents with    Follow-Up from Hospital     cellulitis left leg       History of Present illness - Follow up of Hospital diagnosis(es):left lower extremity cellulitis requiring IV antibiotics. Discharged on keflex and doxy. LLE remains red and swollen but patient reports it is significantly improved from hospital admission. Will have him come back into the office later this week for follow up again as I am concerned about him needing additional antibiotic therapy. Inpatient course: Discharge summary reviewed- see chart. Interval history/Current status: stable    Vitals:    09/10/21 0836   BP: 115/70   Site: Left Upper Arm   Position: Sitting   Cuff Size: Large Adult   Pulse: 83   Resp: 14   Temp: 93.8 °F (34.3 °C)   TempSrc: Temporal   SpO2: 98%   Weight: (!) 311 lb (141.1 kg)   Height: 5' 6\" (1.676 m)     Body mass index is 50.2 kg/m². Wt Readings from Last 3 Encounters:   09/16/21 (!) 311 lb (141.1 kg)   09/10/21 (!) 311 lb (141.1 kg)   06/15/21 (!) 325 lb (147.4 kg)     BP Readings from Last 3 Encounters:   09/16/21 110/62   09/10/21 115/70   07/23/21 134/83       A comprehensive review of systems was negative except for what was noted in the HPI. Physical Exam:  Cardiovascular: normal rate, normal S1 and S2 and edema noted in LLE as well as remaining redness    Assessment/Plan:  1. Cellulitis of left lower extremity  Improving but guarded  Continue antibiotic therapy as discussed  Encouraged to provide update to office on Sunday or Monday morning to determine if additional antibiotic therapy is needed.   Call office with any questions or concerns        Medical Decision Making: straightforward

## 2021-09-13 DIAGNOSIS — L03.119 CELLULITIS OF LOWER EXTREMITY, UNSPECIFIED LATERALITY: Primary | ICD-10-CM

## 2021-09-13 RX ORDER — CEPHALEXIN 500 MG/1
500 CAPSULE ORAL 3 TIMES DAILY
Qty: 30 CAPSULE | Refills: 0 | Status: SHIPPED | OUTPATIENT
Start: 2021-09-13 | End: 2021-09-23

## 2021-09-13 RX ORDER — DOXYCYCLINE HYCLATE 100 MG/1
100 CAPSULE ORAL 2 TIMES DAILY
Qty: 20 CAPSULE | Refills: 0 | Status: SHIPPED | OUTPATIENT
Start: 2021-09-13 | End: 2021-09-23

## 2021-09-16 ENCOUNTER — OFFICE VISIT (OUTPATIENT)
Dept: FAMILY MEDICINE CLINIC | Age: 51
End: 2021-09-16
Payer: COMMERCIAL

## 2021-09-16 VITALS
DIASTOLIC BLOOD PRESSURE: 62 MMHG | WEIGHT: 311 LBS | HEIGHT: 66 IN | OXYGEN SATURATION: 97 % | SYSTOLIC BLOOD PRESSURE: 110 MMHG | RESPIRATION RATE: 12 BRPM | TEMPERATURE: 94.2 F | BODY MASS INDEX: 49.98 KG/M2 | HEART RATE: 80 BPM

## 2021-09-16 DIAGNOSIS — L03.116 CELLULITIS OF LEFT LOWER EXTREMITY: Primary | ICD-10-CM

## 2021-09-16 PROCEDURE — G8427 DOCREV CUR MEDS BY ELIG CLIN: HCPCS | Performed by: NURSE PRACTITIONER

## 2021-09-16 PROCEDURE — 3017F COLORECTAL CA SCREEN DOC REV: CPT | Performed by: NURSE PRACTITIONER

## 2021-09-16 PROCEDURE — G8417 CALC BMI ABV UP PARAM F/U: HCPCS | Performed by: NURSE PRACTITIONER

## 2021-09-16 PROCEDURE — 1036F TOBACCO NON-USER: CPT | Performed by: NURSE PRACTITIONER

## 2021-09-16 PROCEDURE — 99213 OFFICE O/P EST LOW 20 MIN: CPT | Performed by: NURSE PRACTITIONER

## 2021-09-16 NOTE — PROGRESS NOTES
Nose normal.      Mouth/Throat:      Lips: Pink. Pulmonary:      Effort: Pulmonary effort is normal. No respiratory distress. Neurological:      Mental Status: He is alert and oriented to person, place, and time. Psychiatric:         Attention and Perception: Attention normal.         Speech: Speech normal.         Behavior: Behavior is cooperative. An electronic signature was used to authenticate this note.     --LAMONT Main - NP

## 2021-09-27 ASSESSMENT — ENCOUNTER SYMPTOMS
COUGH: 0
VOMITING: 0
CONSTIPATION: 0
NAUSEA: 0
SHORTNESS OF BREATH: 0
SORE THROAT: 0
RHINORRHEA: 0
BACK PAIN: 1
DIARRHEA: 0

## 2021-10-03 PROBLEM — R25.1 TREMOR: Status: ACTIVE | Noted: 2021-10-03

## 2021-10-03 NOTE — PROGRESS NOTES
[address]  [phone]    10/3/21     Patient ID  Tomi Cardenas is a 46 y.o. male  Established patient    Chief Complaint  Tomi Cardenas presents today for Pain, Sleep Apnea, and Tremors      ASSESSMENT/PLAN  1. Chronic bilateral low back pain without sciatica  2. Cellulitis of left lower extremity  Comments:  resolved   3. Obstructive sleep apnea syndrome  Assessment & Plan:   Monitored by specialist- no acute findings meriting change in the plan   CPAP HS   4. Morbid obesity ([address]  Assessment & Plan:   Monitored by specialist- no acute findings meriting change in the plan   Continues to follow with bariatric, labs ordered   5. Tremor  6. Vitamin D deficiency  7. Screening for colorectal cancer  -     POCT Fecal Immunochemical Test (FIT); Future       Return in about 4 months (around 2/4/2022). On this date 10/4/2021 I have spent 32+ minutes reviewing previous notes, test results and face to face with the patient discussing the diagnosis and importance of compliance with the treatment plan as well as documenting on the day of the visit. Patient Care Team:  LAMONT Gill CNP as PCP - General (Nurse Practitioner Family)  LAMONT Alfonso NP as PCP - [facility] Empaneled Provider  Jh Moon MD as Consulting Physician (Pulmonary Disease)  LAMONT Mccartney CNP (Nurse Practitioner Adult Health)    SUBJECTIVE/OBJECTIVE  History of Present illness / Visit Summary     Patient presents to office today for a follow-up appointment. We did review past documentation. It is noted that his last pulmonology appointment was February 2021. Encourage patient to make follow-up appointment as he has obstructive sleep apnea and utilizes CPAP at night    Patient has followed with neurosurgery for 2 visits. He no longer needs to follow routinely, but they are available if needed. 7/23/2021 - Neurosurgery   1.  Lumbar spondylosis   Plan: Patient with approximately 1 year history of low back pain. Symptoms started after undergoing gastric sleeve and losing approximately 140 pounds. Pain is predominantly axial in nature. Has completed course of physical therapy, does report significant improvement in symptoms. Denies claudication or radicular symptoms. Recommend following up on an as-needed basis. Should axial lumbar pain return, would recommend seeking pain management referral from PCP for interventions. Should the patient develop any numbness tingling weakness to lower extremities, saddle anesthesia, changes to bowel/bladder, would recommend follow-up here in the office. Review of Systems  Review of Systems   Constitutional: Negative for activity change, appetite change, chills, fatigue and fever. HENT: Negative for congestion, ear pain, postnasal drip, rhinorrhea, sinus pressure, sneezing, sore throat and trouble swallowing. Respiratory: Negative for cough, chest tightness, shortness of breath and wheezing. Cardiovascular: Positive for leg swelling. Negative for chest pain and palpitations. Gastrointestinal: Negative for abdominal pain, blood in stool, constipation, diarrhea and nausea. Infrequent meals, past gastric bypass 2019   Genitourinary: Negative for difficulty urinating, dysuria, frequency, hematuria and urgency. Musculoskeletal: Positive for back pain (lumbar, chronic ). Negative for arthralgias, gait problem, joint swelling and myalgias. Recently saw Neurosurgery. Not surgical at this time, PT completed    Skin: Negative for rash and wound. Allergic/Immunologic: Negative for environmental allergies. Neurological: Positive for tremors (left hand, random ). Negative for dizziness, syncope, light-headedness, numbness and headaches. Hematological: Does not bruise/bleed easily.    Psychiatric/Behavioral: Negative for agitation, decreased concentration, self-injury, sleep disturbance and suicidal ideas. The patient is not nervous/anxious. CPAP HS        Physical exam   Physical Exam  Vitals and nursing note reviewed. Constitutional:       General: He is not in acute distress. Appearance: Normal appearance. He is well-developed and well-groomed. He is morbidly obese. He is not ill-appearing or toxic-appearing. HENT:      Head: Normocephalic. Right Ear: Tympanic membrane, ear canal and external ear normal. No middle ear effusion. There is no impacted cerumen. Tympanic membrane is not erythematous, retracted or bulging. Left Ear: Tympanic membrane, ear canal and external ear normal.  No middle ear effusion. There is no impacted cerumen. Tympanic membrane is not erythematous, retracted or bulging. Nose: Nose normal. No mucosal edema, congestion or rhinorrhea. Right Turbinates: Not enlarged or swollen. Left Turbinates: Not enlarged or swollen. Right Sinus: No maxillary sinus tenderness or frontal sinus tenderness. Left Sinus: No maxillary sinus tenderness or frontal sinus tenderness. Mouth/Throat:      Lips: Pink. Mouth: Mucous membranes are moist.      Pharynx: Oropharynx is clear. No oropharyngeal exudate, posterior oropharyngeal erythema or uvula swelling. Eyes:      General: Lids are normal. Vision grossly intact. No allergic shiner. Conjunctiva/sclera: Conjunctivae normal.      Comments: Glasses    Cardiovascular:      Rate and Rhythm: Normal rate and regular rhythm. No extrasystoles are present. Pulses: Normal pulses. Radial pulses are 2+ on the right side and 2+ on the left side. Heart sounds: Normal heart sounds, S1 normal and S2 normal. No murmur heard. Comments: compression hose to left leg   Pulmonary:      Effort: Pulmonary effort is normal. No accessory muscle usage, prolonged expiration or respiratory distress. Breath sounds: Normal breath sounds and air entry. No wheezing, rhonchi or rales. Abdominal:      General: There is no distension. Palpations: Abdomen is soft. Tenderness: There is no abdominal tenderness. Comments: Obese, abdominal apron    Musculoskeletal:      Cervical back: Normal range of motion. No torticollis. No pain with movement. Right lower le+ Edema present. Left lower le+ Edema present. Lymphadenopathy:      Cervical: No cervical adenopathy. Skin:     General: Skin is warm and dry. Coloration: Skin is not ashen, cyanotic, jaundiced or pale. Neurological:      General: No focal deficit present. Mental Status: He is alert and oriented to person, place, and time. Motor: Motor function is intact. Gait: Gait is intact. Psychiatric:         Attention and Perception: Attention and perception normal.         Mood and Affect: Mood and affect normal.         Speech: Speech normal.         Behavior: Behavior normal. Behavior is cooperative. Thought Content: Thought content normal. Thought content does not include suicidal ideation. Thought content does not include suicidal plan. Cognition and Memory: Cognition and memory normal.         Judgment: Judgment normal.           Electronically signed by LAMONT Gómez CNP, APRN-CNP on 10/4/2021 at 12:02 PM    Please note that this chart was generated using voice recognition Dragon dictation software. Although every effort was made to ensure the accuracy of this automated transcription, some errors in transcription may have occurred.

## 2021-10-04 ENCOUNTER — OFFICE VISIT (OUTPATIENT)
Dept: FAMILY MEDICINE CLINIC | Age: 51
End: 2021-10-04
Payer: COMMERCIAL

## 2021-10-04 VITALS
BODY MASS INDEX: 49.29 KG/M2 | DIASTOLIC BLOOD PRESSURE: 78 MMHG | WEIGHT: 305.4 LBS | OXYGEN SATURATION: 97 % | RESPIRATION RATE: 18 BRPM | TEMPERATURE: 96.7 F | HEART RATE: 73 BPM | SYSTOLIC BLOOD PRESSURE: 120 MMHG

## 2021-10-04 DIAGNOSIS — E66.01 MORBID OBESITY (HCC): ICD-10-CM

## 2021-10-04 DIAGNOSIS — M54.50 CHRONIC BILATERAL LOW BACK PAIN WITHOUT SCIATICA: Primary | ICD-10-CM

## 2021-10-04 DIAGNOSIS — L03.116 CELLULITIS OF LEFT LOWER EXTREMITY: ICD-10-CM

## 2021-10-04 DIAGNOSIS — G47.33 OBSTRUCTIVE SLEEP APNEA SYNDROME: ICD-10-CM

## 2021-10-04 DIAGNOSIS — Z12.12 SCREENING FOR COLORECTAL CANCER: ICD-10-CM

## 2021-10-04 DIAGNOSIS — E55.9 VITAMIN D DEFICIENCY: ICD-10-CM

## 2021-10-04 DIAGNOSIS — Z12.11 SCREENING FOR COLORECTAL CANCER: ICD-10-CM

## 2021-10-04 DIAGNOSIS — G89.29 CHRONIC BILATERAL LOW BACK PAIN WITHOUT SCIATICA: Primary | ICD-10-CM

## 2021-10-04 DIAGNOSIS — R25.1 TREMOR: ICD-10-CM

## 2021-10-04 PROCEDURE — G8427 DOCREV CUR MEDS BY ELIG CLIN: HCPCS | Performed by: NURSE PRACTITIONER

## 2021-10-04 PROCEDURE — 99214 OFFICE O/P EST MOD 30 MIN: CPT | Performed by: NURSE PRACTITIONER

## 2021-10-04 PROCEDURE — G8417 CALC BMI ABV UP PARAM F/U: HCPCS | Performed by: NURSE PRACTITIONER

## 2021-10-04 PROCEDURE — 1036F TOBACCO NON-USER: CPT | Performed by: NURSE PRACTITIONER

## 2021-10-04 PROCEDURE — G8484 FLU IMMUNIZE NO ADMIN: HCPCS | Performed by: NURSE PRACTITIONER

## 2021-10-04 PROCEDURE — 3017F COLORECTAL CA SCREEN DOC REV: CPT | Performed by: NURSE PRACTITIONER

## 2021-10-04 ASSESSMENT — ENCOUNTER SYMPTOMS
RHINORRHEA: 0
ABDOMINAL PAIN: 0
SINUS PRESSURE: 0
BLOOD IN STOOL: 0
NAUSEA: 0
DIARRHEA: 0
BACK PAIN: 1
TROUBLE SWALLOWING: 0
COUGH: 0
CONSTIPATION: 0
CHEST TIGHTNESS: 0
SORE THROAT: 0
SHORTNESS OF BREATH: 0
WHEEZING: 0

## 2021-10-04 ASSESSMENT — VISUAL ACUITY: OU: 1

## 2021-10-04 NOTE — ASSESSMENT & PLAN NOTE
Monitored by specialist- no acute findings meriting change in the plan   Continues to follow with bariatric, labs ordered

## 2021-10-04 NOTE — PROGRESS NOTES
Have you seen any other physician or provider since your last visit? No  Have you had any other diagnostic tests since your last visit? No  Have you been seen in the emergency room and/or had an admission to a hospital since we last saw you?  No

## 2021-12-14 ENCOUNTER — OFFICE VISIT (OUTPATIENT)
Dept: BARIATRICS/WEIGHT MGMT | Age: 51
End: 2021-12-14
Payer: COMMERCIAL

## 2021-12-14 VITALS
DIASTOLIC BLOOD PRESSURE: 80 MMHG | HEIGHT: 66 IN | WEIGHT: 314 LBS | BODY MASS INDEX: 50.46 KG/M2 | HEART RATE: 74 BPM | SYSTOLIC BLOOD PRESSURE: 120 MMHG

## 2021-12-14 DIAGNOSIS — G89.29 CHRONIC BILATERAL LOW BACK PAIN WITHOUT SCIATICA: ICD-10-CM

## 2021-12-14 DIAGNOSIS — G47.33 OBSTRUCTIVE SLEEP APNEA SYNDROME: Primary | ICD-10-CM

## 2021-12-14 DIAGNOSIS — Z98.84 S/P LAPAROSCOPIC SLEEVE GASTRECTOMY: ICD-10-CM

## 2021-12-14 DIAGNOSIS — E66.01 MORBID OBESITY (HCC): ICD-10-CM

## 2021-12-14 DIAGNOSIS — M54.50 CHRONIC BILATERAL LOW BACK PAIN WITHOUT SCIATICA: ICD-10-CM

## 2021-12-14 DIAGNOSIS — J45.909 UNCOMPLICATED ASTHMA, UNSPECIFIED ASTHMA SEVERITY, UNSPECIFIED WHETHER PERSISTENT: ICD-10-CM

## 2021-12-14 PROCEDURE — G8417 CALC BMI ABV UP PARAM F/U: HCPCS | Performed by: NURSE PRACTITIONER

## 2021-12-14 PROCEDURE — 3017F COLORECTAL CA SCREEN DOC REV: CPT | Performed by: NURSE PRACTITIONER

## 2021-12-14 PROCEDURE — G8427 DOCREV CUR MEDS BY ELIG CLIN: HCPCS | Performed by: NURSE PRACTITIONER

## 2021-12-14 PROCEDURE — G8484 FLU IMMUNIZE NO ADMIN: HCPCS | Performed by: NURSE PRACTITIONER

## 2021-12-14 PROCEDURE — 99213 OFFICE O/P EST LOW 20 MIN: CPT | Performed by: NURSE PRACTITIONER

## 2021-12-14 PROCEDURE — 1036F TOBACCO NON-USER: CPT | Performed by: NURSE PRACTITIONER

## 2021-12-14 NOTE — PROGRESS NOTES
Medical Nutrition Therapy  Metabolic and Bariatric surgery  Two years after surgery         Pt reports:         Vitals:   Vitals:    12/14/21 1632   BP: 120/80   Site: Right Upper Arm   Position: Sitting   Cuff Size: Large Adult   Pulse: 74   Weight: (!) 314 lb (142.4 kg)   Height: 5' 5.5\" (1.664 m)      Body mass index is 51.46 kg/m². Labs reviewed:     Multivitamin/mineral intake: daily  Calcium intake: daily     Other:              Nutrition Assessment:   PES: Inadequate food and beverage intake r/t WLS as evidenced by loss of excess body weight lost 121 lbs over 2 year. Goals   60-80gm of protein  48-64oz of fluid  Vitamin adherance  Basic adherance to WLS behavious and this document has been scanned into the chart.        [x] met     []  Not met        Plan:   F/u annually     Valentino Ang, MS, RD, LD

## 2021-12-14 NOTE — PROGRESS NOTES
Post-op Bariatric Surgery Note    Subjective     Patient is 2 years s/p laparoscopic sleeve gastrectomy, down 121 lbs. Overall, doing well. Incisions well healed. Consistent use of MVI and calcium. Physical activity includes walking. No pain or other specific problems or concerns. Needs to have labs drawn which were previously ordered. Allergies: Allergies   Allergen Reactions    Pcn [Penicillins] Anaphylaxis    Lidocaine      Pt unsure of reaction,  Happened as a child       Past Medical History:     Past Medical History:   Diagnosis Date    Abnormal stress test 03/2019    Allergic rhinitis     Asthma     Moderate persistent asthma, unspecified whether complicated    Borderline diabetes mellitus     does not take meds regularly    Helicobacter pylori infection 8/31/2020    History of cardiac cath 2019    History of Helicobacter pylori infection     has gone through 4 rounds of treatment, tested negative 10/2019    Hypertension     FP, NP. Kimberly Yanes, will be seen on 9/9/2019 pror to spencer on 9/16/2019    Indigestion     Low iron 1/29/2020    Morbid obesity ([address] 10/22/2019    Obesity     TASHA on CPAP     CPAP NIGHTLY, Dr. Gretchen Soto Positive H. pylori test 06/2019    Post-nasal drip     S/P laparoscopic sleeve gastrectomy 1/29/2020    Scalp psoriasis 10/24/2012    Sleep apnea 6/28/2016    Sprain, lumbar     Vitamin D deficiency 4/23/2012    Wears glasses    .     Past Surgical History:  Past Surgical History:   Procedure Laterality Date    CARDIAC CATHETERIZATION  04/17/2019    Cardiologist, Dr. Patsy Montez (no stents)    GASTRIC BYPASS SURGERY  10/22/2019    robotic laparascopic gastric sleeve    HEEL SPUR SURGERY Right 2009    ROTATOR CUFF REPAIR Left 2010    SLEEVE GASTRECTOMY N/A 10/22/2019    XI ROBOTIC LAPAROSCOPIC GASTRIC SLEEVE, EGD- GI UNIT SCHEDULED performed by Arnaldo Fried DO at [facility] N/A 3/8/2019    EGD BIOPSY performed by Daija Colin DO at [facility]       Family History:  Family History   Problem Relation Age of Onset    Lung Cancer Maternal Grandfather     Broken Bones Paternal Grandmother         hip    Glaucoma Paternal Grandfather     Diabetes Mother     High Cholesterol Mother     Mental Illness Mother     Heart Disease Mother     High Blood Pressure Mother     Depression Mother     Sleep Apnea Mother     Other Mother         GERD    High Cholesterol Father     Diabetes Father     High Blood Pressure Father     Asthma Father     Sleep Apnea Father    Ruiz Cancer Sister 48        unsure of name, lived 2 weeks after diagnosis       Social History:  Social History     Socioeconomic History    Marital status: Legally      Spouse name: Not on file    Number of children: 0    Years of education: Not on file    Highest education level: Not on file   Occupational History    Not on file   Tobacco Use    Smoking status: Never Smoker    Smokeless tobacco: Never Used   Vaping Use    Vaping Use: Never used   Substance and Sexual Activity    Alcohol use: Not Currently     Alcohol/week: 0.0 standard drinks     Comment: rarely    Drug use: Never    Sexual activity: Not Currently     Partners: Female     Comment: girlfriend had tubal   Other Topics Concern    Not on file   Social History Narrative    Not on file     Social Determinants of Health     Financial Resource Strain: Low Risk     Difficulty of Paying Living Expenses: Not hard at all   Food Insecurity: No Food Insecurity    Worried About Running Out of Food in the Last Year: Never true    Merlin of Food in the Last Year: Never true   Transportation Needs:     Lack of Transportation (Medical): Not on file    Lack of Transportation (Non-Medical):  Not on file   Physical Activity:     Days of Exercise per Week: Not on file    Minutes of Exercise per Session: Not on file   Stress:     Feeling of Stress : Not on file Social Connections:     Frequency of Communication with Friends and Family: Not on file    Frequency of Social Gatherings with Friends and Family: Not on file    Attends Bahai Services: Not on file    Active Member of Clubs or Organizations: Not on file    Attends Club or Organization Meetings: Not on file    Marital Status: Not on file   Intimate Partner Violence:     Fear of Current or Ex-Partner: Not on file    Emotionally Abused: Not on file    Physically Abused: Not on file    Sexually Abused: Not on file   Housing Stability:     Unable to Pay for Housing in the Last Year: Not on file    Number of [place] in the Last Year: Not on file    Unstable Housing in the Last Year: Not on file       Current Medications:  Current Outpatient Medications   Medication Sig Dispense Refill    calcium carbonate (TUMS) 500 MG chewable tablet Take 1 tablet by mouth daily      Multiple Vitamins-Minerals (THERAPEUTIC MULTIVITAMIN-MINERALS) tablet Take 1 tablet by mouth daily Indications: celebrate      vitamin D (ERGOCALCIFEROL) 1.25 MG (11178 UT) CAPS capsule Take 1 capsule by mouth once a week for 8 doses (Patient not taking: Reported on 12/14/2021) 8 capsule 0     No current facility-administered medications for this visit. Vital Signs:  /80 (Site: Right Upper Arm, Position: Sitting, Cuff Size: Large Adult)   Pulse 74   Ht 5' 5.5\" (1.664 m)   Wt (!) 314 lb (142.4 kg)   BMI 51.46 kg/m²     BMI/Height/Weight:  Body mass index is 51.46 kg/m². Review of Systems - A review of systems was performed. All was negative unless otherwise documented in HPI. Constitutional: Negative for fever, chills and diaphoresis. HENT: Negative for hearing loss and trouble swallowing. Eyes: Negative for photophobia and visual disturbance. Respiratory: Negative for cough, shortness of breath and wheezing. Cardiovascular: Negative for chest pain and palpitations.    Gastrointestinal: Negative for nausea, vomiting, abdominal pain, diarrhea, constipation, blood in stool and abdominal distention. Endocrine: Negative for polydipsia, polyphagia and polyuria. Genitourinary: Negative for dysuria, frequency, hematuria and difficulty urinating. Musculoskeletal: Negative for myalgias, joint swelling. Skin: Negative for pallor and rash. Neurological: Negative for dizziness, tremors, light-headedness and headaches. Psychiatric/Behavioral: Negative for sleep disturbance and dysphoric mood. Objective:      Physical Exam   Vital signs reviewed. General: Well-developed and well-nourished. No acute distress. Skin: Warm, dry and intact. HEENT: Normocephalic. EOMs intact. Conjunctivae normal. Neck supple. Cardiovascular: Normal rate, regular rhythm. Pulmonary/Chest: Normal effort. Lungs clear to auscultation. No rales, rhonchi or wheezing. Abdominal: Positive bowel sounds. Soft, nontender. Nondistended. No rigidity, rebound, or guarding. Musculoskeletal: Movement x4. No edema. Neurological: Gait normal. Alert and oriented to person, place, and time. Psychiatric: Normal mood and affect. Speech and behavior normal. Judgment and thought content normal. Cognition and memory intact. Assessment:       Diagnosis Orders   1. Obstructive sleep apnea syndrome     2. Morbid obesity ([facility] [address]     3. Uncomplicated asthma, unspecified asthma severity, unspecified whether persistent     4. S/P laparoscopic sleeve gastrectomy     5. Chronic bilateral low back pain without sciatica         Plan:    Dietitian visit today. Patient to continue to increase protein to obtain 60-80g/day, at least 48-64oz of fluid daily, and gradually increase exercise regimen. Patient encouraged to have labs drawn. Follow-up  Return in about 1 year (around 12/14/2022). Orders this encounter:  No orders of the defined types were placed in this encounter.       Prescriptions this encounter:  No orders of the defined types were placed in this encounter.       Electronically signed by:  Hi Kraft CNP

## 2021-12-16 ENCOUNTER — HOSPITAL ENCOUNTER (OUTPATIENT)
Age: 51
Discharge: HOME | End: 2021-12-16
Payer: COMMERCIAL

## 2021-12-16 DIAGNOSIS — Z98.84 S/P LAPAROSCOPIC SLEEVE GASTRECTOMY: ICD-10-CM

## 2021-12-16 DIAGNOSIS — G47.33 OBSTRUCTIVE SLEEP APNEA SYNDROME: ICD-10-CM

## 2021-12-16 DIAGNOSIS — J45.909 UNCOMPLICATED ASTHMA, UNSPECIFIED ASTHMA SEVERITY, UNSPECIFIED WHETHER PERSISTENT: ICD-10-CM

## 2021-12-16 DIAGNOSIS — E66.01 MORBID OBESITY (HCC): ICD-10-CM

## 2021-12-16 DIAGNOSIS — E61.1 LOW IRON: ICD-10-CM

## 2021-12-16 LAB
ABSOLUTE EOS #: 0.24 K/UL (ref 0–0.44)
ABSOLUTE IMMATURE GRANULOCYTE: <0.03 K/UL (ref 0–0.3)
ABSOLUTE LYMPH #: 2.39 K/UL (ref 1.1–3.7)
ABSOLUTE MONO #: 0.61 K/UL (ref 0.1–1.2)
ALBUMIN SERPL-MCNC: 4.2 G/DL (ref 3.5–5.2)
ALBUMIN/GLOBULIN RATIO: 1.4 (ref 1–2.5)
ALP BLD-CCNC: 62 U/L (ref 40–129)
ALT SERPL-CCNC: 12 U/L (ref 5–41)
ANION GAP SERPL CALCULATED.3IONS-SCNC: 14 MMOL/L (ref 9–17)
AST SERPL-CCNC: 22 U/L
BASOPHILS # BLD: 1 % (ref 0–2)
BASOPHILS ABSOLUTE: 0.05 K/UL (ref 0–0.2)
BILIRUB SERPL-MCNC: 0.46 MG/DL (ref 0.3–1.2)
BUN BLDV-MCNC: 16 MG/DL (ref 6–20)
BUN/CREAT BLD: NORMAL (ref 9–20)
CALCIUM SERPL-MCNC: 9.2 MG/DL (ref 8.6–10.4)
CHLORIDE BLD-SCNC: 103 MMOL/L (ref 98–107)
CHOLESTEROL/HDL RATIO: 3.4
CHOLESTEROL: 134 MG/DL
CO2: 25 MMOL/L (ref 20–31)
CREAT SERPL-MCNC: 0.76 MG/DL (ref 0.7–1.2)
DIFFERENTIAL TYPE: NORMAL
EOSINOPHILS RELATIVE PERCENT: 4 % (ref 1–4)
FERRITIN: 95 UG/L (ref 30–400)
GFR AFRICAN AMERICAN: >60 ML/MIN
GFR NON-AFRICAN AMERICAN: >60 ML/MIN
GFR SERPL CREATININE-BSD FRML MDRD: NORMAL ML/MIN/{1.73_M2}
GFR SERPL CREATININE-BSD FRML MDRD: NORMAL ML/MIN/{1.73_M2}
GLUCOSE BLD-MCNC: 85 MG/DL (ref 70–99)
HCT VFR BLD CALC: 48.7 % (ref 40.7–50.3)
HDLC SERPL-MCNC: 40 MG/DL
HEMOGLOBIN: 16.2 G/DL (ref 13–17)
IMMATURE GRANULOCYTES: 0 %
IRON SATURATION: 40 % (ref 20–55)
IRON: 112 UG/DL (ref 59–158)
LDL CHOLESTEROL: 79 MG/DL (ref 0–130)
LYMPHOCYTES # BLD: 37 % (ref 24–43)
MAGNESIUM: 2.3 MG/DL (ref 1.6–2.6)
MCH RBC QN AUTO: 29.9 PG (ref 25.2–33.5)
MCHC RBC AUTO-ENTMCNC: 33.3 G/DL (ref 28.4–34.8)
MCV RBC AUTO: 90 FL (ref 82.6–102.9)
MONOCYTES # BLD: 9 % (ref 3–12)
NRBC AUTOMATED: 0 PER 100 WBC
PDW BLD-RTO: 12.3 % (ref 11.8–14.4)
PLATELET # BLD: 208 K/UL (ref 138–453)
PLATELET ESTIMATE: NORMAL
PMV BLD AUTO: 10.6 FL (ref 8.1–13.5)
POTASSIUM SERPL-SCNC: 4.6 MMOL/L (ref 3.7–5.3)
RBC # BLD: 5.41 M/UL (ref 4.21–5.77)
RBC # BLD: NORMAL 10*6/UL
SEG NEUTROPHILS: 49 % (ref 36–65)
SEGMENTED NEUTROPHILS ABSOLUTE COUNT: 3.23 K/UL (ref 1.5–8.1)
SODIUM BLD-SCNC: 142 MMOL/L (ref 135–144)
THYROXINE, FREE: 1.18 NG/DL (ref 0.93–1.7)
TOTAL IRON BINDING CAPACITY: 283 UG/DL (ref 250–450)
TOTAL PROTEIN: 7.2 G/DL (ref 6.4–8.3)
TRIGL SERPL-MCNC: 75 MG/DL
TSH SERPL DL<=0.05 MIU/L-ACNC: 1.32 MIU/L (ref 0.3–5)
UNSATURATED IRON BINDING CAPACITY: 171 UG/DL (ref 112–347)
VITAMIN D 25-HYDROXY: 30.9 NG/ML (ref 30–100)
VLDLC SERPL CALC-MCNC: ABNORMAL MG/DL (ref 1–30)
WBC # BLD: 6.5 K/UL (ref 3.5–11.3)
WBC # BLD: NORMAL 10*3/UL

## 2021-12-16 PROCEDURE — 82728 ASSAY OF FERRITIN: CPT

## 2021-12-16 PROCEDURE — 84590 ASSAY OF VITAMIN A: CPT

## 2021-12-16 PROCEDURE — 85025 COMPLETE CBC W/AUTO DIFF WBC: CPT

## 2021-12-16 PROCEDURE — 84443 ASSAY THYROID STIM HORMONE: CPT

## 2021-12-16 PROCEDURE — 83540 ASSAY OF IRON: CPT

## 2021-12-16 PROCEDURE — 36415 COLL VENOUS BLD VENIPUNCTURE: CPT

## 2021-12-16 PROCEDURE — 82306 VITAMIN D 25 HYDROXY: CPT

## 2021-12-16 PROCEDURE — 80061 LIPID PANEL: CPT

## 2021-12-16 PROCEDURE — 83970 ASSAY OF PARATHORMONE: CPT

## 2021-12-16 PROCEDURE — 82607 VITAMIN B-12: CPT

## 2021-12-16 PROCEDURE — 83550 IRON BINDING TEST: CPT

## 2021-12-16 PROCEDURE — 83036 HEMOGLOBIN GLYCOSYLATED A1C: CPT

## 2021-12-16 PROCEDURE — 80053 COMPREHEN METABOLIC PANEL: CPT

## 2021-12-16 PROCEDURE — 82746 ASSAY OF FOLIC ACID SERUM: CPT

## 2021-12-16 PROCEDURE — 84630 ASSAY OF ZINC: CPT

## 2021-12-16 PROCEDURE — 84425 ASSAY OF VITAMIN B-1: CPT

## 2021-12-16 PROCEDURE — 84439 ASSAY OF FREE THYROXINE: CPT

## 2021-12-16 PROCEDURE — 83735 ASSAY OF MAGNESIUM: CPT

## 2021-12-17 DIAGNOSIS — E55.9 VITAMIN D DEFICIENCY: ICD-10-CM

## 2021-12-17 LAB
ESTIMATED AVERAGE GLUCOSE: 108 MG/DL
FOLATE: 17 NG/ML
HBA1C MFR BLD: 5.4 % (ref 4–6)
PTH INTACT: 50.49 PG/ML (ref 15–65)
VITAMIN B-12: 240 PG/ML (ref 232–1245)

## 2021-12-17 RX ORDER — ERGOCALCIFEROL 1.25 MG/1
50000 CAPSULE ORAL WEEKLY
Qty: 8 CAPSULE | Refills: 0 | Status: SHIPPED | OUTPATIENT
Start: 2021-12-17 | End: 2022-02-05

## 2021-12-20 LAB
RETINYL PALMITATE: <0.02 MG/L (ref 0–0.1)
VITAMIN A LEVEL: 0.57 MG/L (ref 0.3–1.2)
VITAMIN A, INTERP: NORMAL

## 2021-12-22 LAB — ZINC: 72.3 UG/DL (ref 60–120)

## 2021-12-23 LAB — VITAMIN B1 WHOLE BLOOD: 115 NMOL/L (ref 70–180)

## 2023-04-17 ENCOUNTER — TELEPHONE (OUTPATIENT)
Dept: FAMILY MEDICINE CLINIC | Age: 53
End: 2023-04-17

## 2023-05-01 NOTE — TELEPHONE ENCOUNTER
Patient called to get an update on his [place] paperwork. He needs this paperwork by 5/4.      THIS IS URGENT PT STATED

## 2023-05-03 NOTE — TELEPHONE ENCOUNTER
Pt was notified that [employer] forms were faxed and ready for pick. Pt came to office to  forms. Forms scanned to chart.

## 2023-05-04 ENCOUNTER — HOSPITAL ENCOUNTER (OUTPATIENT)
Age: 53
Discharge: HOME | End: 2023-05-04
Payer: COMMERCIAL

## 2023-05-04 DIAGNOSIS — Z98.84 S/P LAPAROSCOPIC SLEEVE GASTRECTOMY: ICD-10-CM

## 2023-05-04 LAB
FOLATE SERPL-MCNC: 14.6 NG/ML
HCT VFR BLD AUTO: 53.2 % (ref 40.7–50.3)
HGB BLD-MCNC: 17.3 G/DL (ref 13–17)
IRON SATURATION: 37 % (ref 20–55)
IRON SERPL-MCNC: 113 UG/DL (ref 59–158)
MCH RBC QN AUTO: 29.5 PG (ref 25.2–33.5)
MCHC RBC AUTO-ENTMCNC: 32.5 G/DL (ref 28.4–34.8)
MCV RBC AUTO: 90.6 FL (ref 82.6–102.9)
NRBC AUTOMATED: 0 PER 100 WBC
PDW BLD-RTO: 12.1 % (ref 11.8–14.4)
PLATELET # BLD AUTO: 236 K/UL (ref 138–453)
PMV BLD AUTO: 10.5 FL (ref 8.1–13.5)
RBC # BLD: 5.87 M/UL (ref 4.21–5.77)
TIBC SERPL-MCNC: 306 UG/DL (ref 250–450)
UNSATURATED IRON BINDING CAPACITY: 193 UG/DL (ref 112–347)
VIT B12 SERPL-MCNC: 256 PG/ML (ref 232–1245)
WBC # BLD AUTO: 7.7 K/UL (ref 3.5–11.3)

## 2023-05-04 PROCEDURE — 80061 LIPID PANEL: CPT

## 2023-05-04 PROCEDURE — 82607 VITAMIN B-12: CPT

## 2023-05-04 PROCEDURE — 84590 ASSAY OF VITAMIN A: CPT

## 2023-05-04 PROCEDURE — 84443 ASSAY THYROID STIM HORMONE: CPT

## 2023-05-04 PROCEDURE — 36415 COLL VENOUS BLD VENIPUNCTURE: CPT

## 2023-05-04 PROCEDURE — 84425 ASSAY OF VITAMIN B-1: CPT

## 2023-05-04 PROCEDURE — 84630 ASSAY OF ZINC: CPT

## 2023-05-04 PROCEDURE — 83550 IRON BINDING TEST: CPT

## 2023-05-04 PROCEDURE — 83735 ASSAY OF MAGNESIUM: CPT

## 2023-05-04 PROCEDURE — 82306 VITAMIN D 25 HYDROXY: CPT

## 2023-05-04 PROCEDURE — 82746 ASSAY OF FOLIC ACID SERUM: CPT

## 2023-05-04 PROCEDURE — 83540 ASSAY OF IRON: CPT

## 2023-05-04 PROCEDURE — 80053 COMPREHEN METABOLIC PANEL: CPT

## 2023-05-04 PROCEDURE — 85027 COMPLETE CBC AUTOMATED: CPT

## 2023-05-05 LAB
ALBUMIN SERPL-MCNC: 4.1 G/DL (ref 3.5–5.2)
ALBUMIN/GLOBULIN RATIO: 1.1 (ref 1–2.5)
ALP SERPL-CCNC: 77 U/L (ref 40–129)
ALT SERPL-CCNC: 15 U/L (ref 5–41)
ANION GAP SERPL CALCULATED.3IONS-SCNC: 17 MMOL/L (ref 9–17)
AST SERPL-CCNC: 22 U/L
BILIRUB SERPL-MCNC: 0.5 MG/DL (ref 0.3–1.2)
BUN SERPL-MCNC: 10 MG/DL (ref 6–20)
CALCIUM SERPL-MCNC: 10 MG/DL (ref 8.6–10.4)
CHLORIDE SERPL-SCNC: 101 MMOL/L (ref 98–107)
CHOLEST SERPL-MCNC: 173 MG/DL
CHOLESTEROL/HDL RATIO: 4.1
CO2 SERPL-SCNC: 22 MMOL/L (ref 20–31)
CREAT SERPL-MCNC: 0.86 MG/DL (ref 0.7–1.2)
GFR SERPL CREATININE-BSD FRML MDRD: >60 ML/MIN/1.73M2
GLUCOSE SERPL-MCNC: 98 MG/DL (ref 70–99)
HDLC SERPL-MCNC: 42 MG/DL
LDLC SERPL CALC-MCNC: 110 MG/DL (ref 0–130)
MAGNESIUM SERPL-MCNC: 2.5 MG/DL (ref 1.6–2.6)
POTASSIUM SERPL-SCNC: 4.7 MMOL/L (ref 3.7–5.3)
PROT SERPL-MCNC: 7.8 G/DL (ref 6.4–8.3)
SODIUM SERPL-SCNC: 140 MMOL/L (ref 135–144)
TRIGL SERPL-MCNC: 103 MG/DL
TSH SERPL-ACNC: 1.74 UIU/ML (ref 0.3–5)

## 2023-05-06 LAB
25(OH)D3 SERPL-MCNC: 24.9 NG/ML
ZINC: 76.3 UG/DL (ref 60–120)

## 2023-05-08 ENCOUNTER — HOSPITAL ENCOUNTER (OUTPATIENT)
Dept: GENERAL RADIOLOGY | Age: 53
Discharge: HOME | End: 2023-05-10
Payer: COMMERCIAL

## 2023-05-08 ENCOUNTER — HOSPITAL ENCOUNTER (OUTPATIENT)
Age: 53
Discharge: HOME | End: 2023-05-10
Payer: COMMERCIAL

## 2023-05-08 ENCOUNTER — INITIAL CONSULT (OUTPATIENT)
Dept: PAIN MANAGEMENT | Age: 53
End: 2023-05-08
Payer: COMMERCIAL

## 2023-05-08 VITALS — WEIGHT: 310 LBS | HEIGHT: 66 IN | BODY MASS INDEX: 49.82 KG/M2

## 2023-05-08 DIAGNOSIS — M48.061 SPINAL STENOSIS OF LUMBAR REGION, UNSPECIFIED WHETHER NEUROGENIC CLAUDICATION PRESENT: Primary | ICD-10-CM

## 2023-05-08 DIAGNOSIS — M48.061 SPINAL STENOSIS OF LUMBAR REGION, UNSPECIFIED WHETHER NEUROGENIC CLAUDICATION PRESENT: ICD-10-CM

## 2023-05-08 DIAGNOSIS — M47.817 LUMBOSACRAL SPONDYLOSIS WITHOUT MYELOPATHY: ICD-10-CM

## 2023-05-08 LAB
RETINYL PALMITATE: <0.02 MG/L (ref 0–0.1)
VIT B1 PYROPHOSHATE BLD-SCNC: 144 NMOL/L (ref 70–180)
VITAMIN A LEVEL: 0.61 MG/L (ref 0.3–1.2)
VITAMIN A, INTERP: NORMAL

## 2023-05-08 PROCEDURE — 3017F COLORECTAL CA SCREEN DOC REV: CPT | Performed by: PAIN MEDICINE

## 2023-05-08 PROCEDURE — 1036F TOBACCO NON-USER: CPT | Performed by: PAIN MEDICINE

## 2023-05-08 PROCEDURE — 72100 X-RAY EXAM L-S SPINE 2/3 VWS: CPT

## 2023-05-08 PROCEDURE — G8417 CALC BMI ABV UP PARAM F/U: HCPCS | Performed by: PAIN MEDICINE

## 2023-05-08 PROCEDURE — G8427 DOCREV CUR MEDS BY ELIG CLIN: HCPCS | Performed by: PAIN MEDICINE

## 2023-05-08 PROCEDURE — 99204 OFFICE O/P NEW MOD 45 MIN: CPT | Performed by: PAIN MEDICINE

## 2023-05-08 ASSESSMENT — ENCOUNTER SYMPTOMS
BACK PAIN: 1
BOWEL INCONTINENCE: 0

## 2023-05-08 NOTE — PROGRESS NOTES
HPI:     Back Pain  This is a chronic problem. The current episode started more than 1 year ago. The problem has been waxing and waning since onset. The pain is present in the lumbar spine. The quality of the pain is described as aching. The pain does not radiate. The pain is moderate. The symptoms are aggravated by stress, twisting and bending. Pertinent negatives include no bladder incontinence, bowel incontinence, numbness, tingling or weakness. He has tried muscle relaxant and heat for the symptoms. Previous MRI with multilevel stenosis. He has seen a surgeon back in 21. Nothing surgical planned at that time. He has done physical therapy with minimal benefit. He has had a lap sleeve. Patient denies any new neurological symptoms. No bowel or bladder incontinence, no weakness, and no falling. Review of [place]ARRS does not show any aberrant prescription behavior.      Past Medical History:   Diagnosis Date    Abnormal stress test 03/2019    Allergic rhinitis     Asthma     Moderate persistent asthma, unspecified whether complicated    Borderline diabetes mellitus     does not take meds regularly    Helicobacter pylori infection 8/31/2020    History of cardiac cath 2019    History of Helicobacter pylori infection     has gone through 4 rounds of treatment, tested negative 10/2019    Hypertension     FP, NP. Karen Solis, will be seen on 9/9/2019 pror to spencer on 9/16/2019    Indigestion     Low iron 1/29/2020    Morbid obesity ([address] 10/22/2019    Obesity     TASHA on CPAP     CPAP NIGHTLY, Dr. Shay Cosby    Positive H. pylori test 06/2019    Post-nasal drip     S/P laparoscopic sleeve gastrectomy 1/29/2020    Scalp psoriasis 10/24/2012    Sleep apnea 6/28/2016    Sprain, lumbar     Vitamin D deficiency 4/23/2012    Wears glasses        Past Surgical History:   Procedure Laterality Date    CARDIAC CATHETERIZATION  04/17/2019    Cardiologist, Dr. Michelle Barragan (no stents)    GASTRIC BYPASS SURGERY  10/22/2019

## 2023-05-12 DIAGNOSIS — E55.9 VITAMIN D DEFICIENCY: ICD-10-CM

## 2023-05-12 DIAGNOSIS — R71.8 ABNORMAL RBC: Primary | ICD-10-CM

## 2023-05-12 RX ORDER — ERGOCALCIFEROL 1.25 MG/1
50000 CAPSULE ORAL WEEKLY
Qty: 12 CAPSULE | Refills: 1 | Status: SHIPPED | OUTPATIENT
Start: 2023-05-12 | End: 2023-07-29

## 2023-05-22 ENCOUNTER — HOSPITAL ENCOUNTER (OUTPATIENT)
Dept: MRI IMAGING | Age: 53
Discharge: HOME | End: 2023-05-24
Payer: COMMERCIAL

## 2023-05-22 DIAGNOSIS — M48.061 SPINAL STENOSIS OF LUMBAR REGION, UNSPECIFIED WHETHER NEUROGENIC CLAUDICATION PRESENT: ICD-10-CM

## 2023-05-22 PROCEDURE — 72148 MRI LUMBAR SPINE W/O DYE: CPT

## 2023-06-05 ENCOUNTER — OFFICE VISIT (OUTPATIENT)
Dept: PAIN MANAGEMENT | Age: 53
End: 2023-06-05
Payer: COMMERCIAL

## 2023-06-05 VITALS — HEIGHT: 66 IN | BODY MASS INDEX: 49.82 KG/M2 | WEIGHT: 310 LBS

## 2023-06-05 DIAGNOSIS — M47.817 LUMBOSACRAL SPONDYLOSIS WITHOUT MYELOPATHY: Primary | ICD-10-CM

## 2023-06-05 DIAGNOSIS — M62.838 MUSCLE SPASM: ICD-10-CM

## 2023-06-05 DIAGNOSIS — M48.061 SPINAL STENOSIS OF LUMBAR REGION, UNSPECIFIED WHETHER NEUROGENIC CLAUDICATION PRESENT: ICD-10-CM

## 2023-06-05 PROCEDURE — G8427 DOCREV CUR MEDS BY ELIG CLIN: HCPCS | Performed by: PAIN MEDICINE

## 2023-06-05 PROCEDURE — G8417 CALC BMI ABV UP PARAM F/U: HCPCS | Performed by: PAIN MEDICINE

## 2023-06-05 PROCEDURE — 3017F COLORECTAL CA SCREEN DOC REV: CPT | Performed by: PAIN MEDICINE

## 2023-06-05 PROCEDURE — 99214 OFFICE O/P EST MOD 30 MIN: CPT | Performed by: PAIN MEDICINE

## 2023-06-05 PROCEDURE — 1036F TOBACCO NON-USER: CPT | Performed by: PAIN MEDICINE

## 2023-06-05 RX ORDER — BACLOFEN 10 MG/1
TABLET ORAL
Qty: 90 TABLET | Refills: 1 | OUTPATIENT
Start: 2023-06-05

## 2023-06-05 ASSESSMENT — ENCOUNTER SYMPTOMS
BACK PAIN: 1
BOWEL INCONTINENCE: 0

## 2023-06-05 NOTE — PROGRESS NOTES
HPI:     Back Pain  This is a chronic problem. The current episode started more than 1 year ago. The problem occurs constantly. The problem has been waxing and waning since onset. The pain is present in the lumbar spine. The quality of the pain is described as aching. The pain does not radiate. The pain is at a severity of 2/10. The pain is moderate. The symptoms are aggravated by stress, twisting and bending. Pertinent negatives include no bladder incontinence or bowel incontinence. He has tried heat and muscle relaxant for the symptoms. Chronic nonradiating low back pain. He is in physical therapy in the past.  Lumbar spine with multilevel degenerative changes and varying levels of stenosis. Patient denies any new neurological symptoms. No bowel or bladder incontinence, no weakness, and no falling. Review of OARRS does not show any aberrant prescription behavior.      Past Medical History:   Diagnosis Date    Abnormal stress test 03/2019    Allergic rhinitis     Asthma     Moderate persistent asthma, unspecified whether complicated    Borderline diabetes mellitus     does not take meds regularly    Helicobacter pylori infection 8/31/2020    History of cardiac cath 2019    History of Helicobacter pylori infection     has gone through 4 rounds of treatment, tested negative 10/2019    Hypertension     FP, NP. Pa Randall, will be seen on 9/9/2019 pror to spencer on 9/16/2019    Indigestion     Low iron 1/29/2020    Morbid obesity ([address] 10/22/2019    Obesity     TASHA on CPAP     CPAP NIGHTLY, Dr. Chris Fournier    Positive H. pylori test 06/2019    Post-nasal drip     S/P laparoscopic sleeve gastrectomy 1/29/2020    Scalp psoriasis 10/24/2012    Sleep apnea 6/28/2016    Sprain, lumbar     Vitamin D deficiency 4/23/2012    Wears glasses        Past Surgical History:   Procedure Laterality Date    CARDIAC CATHETERIZATION  04/17/2019    Cardiologist, Dr. Lou Schaumann (no stents)    GASTRIC BYPASS SURGERY  10/22/2019

## 2023-07-06 ENCOUNTER — OFFICE VISIT (OUTPATIENT)
Dept: PAIN MANAGEMENT | Age: 53
End: 2023-07-06

## 2023-07-06 VITALS — WEIGHT: 310 LBS | HEIGHT: 67 IN | BODY MASS INDEX: 48.65 KG/M2

## 2023-07-06 DIAGNOSIS — M54.50 CHRONIC BILATERAL LOW BACK PAIN WITHOUT SCIATICA: Primary | ICD-10-CM

## 2023-07-06 DIAGNOSIS — G89.29 CHRONIC BILATERAL LOW BACK PAIN WITHOUT SCIATICA: Primary | ICD-10-CM

## 2023-07-06 DIAGNOSIS — M48.061 SPINAL STENOSIS OF LUMBAR REGION WITHOUT NEUROGENIC CLAUDICATION: ICD-10-CM

## 2023-07-06 DIAGNOSIS — M51.36 DDD (DEGENERATIVE DISC DISEASE), LUMBAR: ICD-10-CM

## 2023-07-06 PROBLEM — M51.369 DDD (DEGENERATIVE DISC DISEASE), LUMBAR: Status: ACTIVE | Noted: 2023-07-06

## 2023-07-06 ASSESSMENT — ENCOUNTER SYMPTOMS
COUGH: 0
BACK PAIN: 1
CONSTIPATION: 0
BOWEL INCONTINENCE: 0
SHORTNESS OF BREATH: 0

## 2023-07-06 NOTE — PROGRESS NOTES
Chief Complaint   Patient presents with    Back Pain         PMH     Pt c/o chronic lumbar pain states pain started after undergoing gastric sleeve and losing approximately 140 pounds. Pain is predominantly axial in nature  Lumbar MRI 5/2023 Multilevel degenerative change with canal stenosis most pronounced at L2-3. Has tried PT back brace and seen NS NP with no plan for surgery   Cannot take NSAIDS d/t hx of  gastric sleeve    Discussed MBB with MD at previous visit  and would like to scheulde     Back Pain  This is a chronic problem. The current episode started more than 1 year ago. The problem occurs constantly. The problem has been gradually worsening since onset. The pain is present in the lumbar spine (4-5 with activity). The quality of the pain is described as aching. The pain does not radiate. The pain is at a severity of 1/10. The pain is mild. The pain is The same all the time. The symptoms are aggravated by bending, position, standing, twisting, sitting and lying down. Pertinent negatives include no bladder incontinence, bowel incontinence, chest pain, fever, numbness or paresthesias. Risk factors include obesity, poor posture and lack of exercise. He has tried home exercises, bed rest, walking, heat, ice, muscle relaxant and chiropractic manipulation for the symptoms. The treatment provided no relief.                  Past Medical History:   Diagnosis Date    Abnormal stress test 03/2019    Allergic rhinitis     Asthma     Moderate persistent asthma, unspecified whether complicated    Borderline diabetes mellitus     does not take meds regularly    Helicobacter pylori infection 8/31/2020    History of cardiac cath 2019    History of Helicobacter pylori infection     has gone through 4 rounds of treatment, tested negative 10/2019    Hypertension     FP, NP. Gisella Fregoso, will be seen on 9/9/2019 pror to spencer on 9/16/2019    Indigestion     Low iron 1/29/2020    Morbid obesity ([address])

## 2023-08-10 ENCOUNTER — HOSPITAL ENCOUNTER (OUTPATIENT)
Dept: PAIN MANAGEMENT | Facility: CLINIC | Age: 53
Discharge: HOME | End: 2023-08-10
Payer: COMMERCIAL

## 2023-08-10 VITALS
RESPIRATION RATE: 20 BRPM | HEART RATE: 82 BPM | DIASTOLIC BLOOD PRESSURE: 88 MMHG | TEMPERATURE: 98.2 F | OXYGEN SATURATION: 94 % | SYSTOLIC BLOOD PRESSURE: 127 MMHG

## 2023-08-10 DIAGNOSIS — R52 PAIN MANAGEMENT: ICD-10-CM

## 2023-08-10 PROCEDURE — 64493 INJ PARAVERT F JNT L/S 1 LEV: CPT

## 2023-08-10 PROCEDURE — 64494 INJ PARAVERT F JNT L/S 2 LEV: CPT

## 2023-08-10 PROCEDURE — 6360000002 HC RX W HCPCS: Performed by: PAIN MEDICINE

## 2023-08-10 RX ORDER — MIDAZOLAM HYDROCHLORIDE 2 MG/2ML
INJECTION, SOLUTION INTRAMUSCULAR; INTRAVENOUS
Status: COMPLETED | OUTPATIENT
Start: 2023-08-10 | End: 2023-08-10

## 2023-08-10 RX ORDER — BUPIVACAINE HYDROCHLORIDE 2.5 MG/ML
INJECTION, SOLUTION EPIDURAL; INFILTRATION; INTRACAUDAL
Status: COMPLETED | OUTPATIENT
Start: 2023-08-10 | End: 2023-08-10

## 2023-08-10 RX ADMIN — MIDAZOLAM HYDROCHLORIDE 1 MG: 1 INJECTION, SOLUTION INTRAMUSCULAR; INTRAVENOUS at 12:52

## 2023-08-10 RX ADMIN — BUPIVACAINE HYDROCHLORIDE 6 ML: 2.5 INJECTION, SOLUTION EPIDURAL; INFILTRATION; INTRACAUDAL; PERINEURAL at 12:57

## 2023-08-10 ASSESSMENT — PAIN DESCRIPTION - FREQUENCY: FREQUENCY: INTERMITTENT

## 2023-08-10 ASSESSMENT — PAIN - FUNCTIONAL ASSESSMENT: PAIN_FUNCTIONAL_ASSESSMENT: ACTIVITIES ARE NOT PREVENTED

## 2023-08-10 ASSESSMENT — PAIN DESCRIPTION - ORIENTATION: ORIENTATION: LOWER

## 2023-08-10 ASSESSMENT — PAIN SCALES - GENERAL
PAINLEVEL_OUTOF10: 3
PAINLEVEL_OUTOF10: 1
PAINLEVEL_OUTOF10: 0

## 2023-08-10 ASSESSMENT — PAIN DESCRIPTION - DESCRIPTORS: DESCRIPTORS: ACHING

## 2023-08-10 ASSESSMENT — PAIN DESCRIPTION - LOCATION: LOCATION: BACK

## 2023-08-10 ASSESSMENT — PAIN DESCRIPTION - PAIN TYPE: TYPE: CHRONIC PAIN

## 2023-08-10 NOTE — DISCHARGE INSTRUCTIONS
You have received a sedative/anesthetic therefore you should not consume any alcoholic beverages for 24 hours. Do not drive or operate machinery for 24 hours. Do not take a tub bath for 72 hours after procedure (this includes hot tubs). You may shower, but avoid hot water to injection site. Avoid strenuous activity TODAY especially if you experience dizziness. Remove band-aid the next day. Wash off any residual iodine 24 hours from today. Do not use heat, heating pad, or any other heating device over the injection site for 3 days after the procedure. If you experience pain after your procedure, you may continue with your current pain medication as prescribed. (DO NOT INCREASE YOUR PAIN MEDICATION WITHOUT TALKING TO DOCTOR)  Soreness and pain at injection site is common, may use ice to reduce soreness. Please complete pain diary as instructed. The office staff will call you to discuss the results of the procedure within 24 hours, please call the office if you do not hear from them.       Call Joshua at [phone] if you experience:   Fever, chills or temperature over 100    Vomiting, headache, persistent stiff neck, nausea or blurred vision   Difficulty urinating or unable to urinate within 8 hours   Increase in weakness, numbness or loss of function of limbs  Increased redness, swelling or drainage at the injection site

## 2023-08-10 NOTE — H&P
Pain Pre-Op H&P Note    Giselle Santamaria MD    HPI: David Mart  presents with back pain.     Past Medical History:   Diagnosis Date    Abnormal stress test 03/2019    Allergic rhinitis     Asthma     Moderate persistent asthma, unspecified whether complicated    Borderline diabetes mellitus     does not take meds regularly    Helicobacter pylori infection 8/31/2020    History of cardiac cath 2019    History of Helicobacter pylori infection     has gone through 4 rounds of treatment, tested negative 10/2019    Hypertension     FP, NP. Win Doshi, will be seen on 9/9/2019 pror to spencer on 9/16/2019    Indigestion     Low iron 1/29/2020    Morbid obesity ([address]) 10/22/2019    Obesity     TASHA on CPAP     CPAP NIGHTLY, Dr. Edwin Brunner    Positive H. pylori test 06/2019    Post-nasal drip     S/P laparoscopic sleeve gastrectomy 1/29/2020    Scalp psoriasis 10/24/2012    Sleep apnea 6/28/2016    Sprain, lumbar     Vitamin D deficiency 4/23/2012    Wears glasses        Past Surgical History:   Procedure Laterality Date    CARDIAC CATHETERIZATION  04/17/2019    Cardiologist, Dr. Moshe Jarrett (no stents)    GASTRIC BYPASS SURGERY  10/22/2019    robotic laparascopic gastric sleeve    HEEL SPUR SURGERY Right 2009    ROTATOR CUFF REPAIR Left 2010    SLEEVE GASTRECTOMY N/A 10/22/2019    XI ROBOTIC LAPAROSCOPIC GASTRIC SLEEVE, EGD- GI UNIT SCHEDULED performed by Wilmer Berger DO at [facility] N/A 3/8/2019    EGD BIOPSY performed by Wilmer Berger DO at [facility]       Family History   Problem Relation Age of Onset    Lung Cancer Maternal Grandfather     Broken Bones Paternal Grandmother         hip    Glaucoma Paternal Grandfather     Diabetes Mother     High Cholesterol Mother     Mental Illness Mother     Heart Disease Mother     High Blood Pressure Mother     Depression Mother     Sleep Apnea Mother     Other Mother         GERD    High Cholesterol Father     Diabetes Father

## 2023-08-10 NOTE — OP NOTE
Lumbar Facet Nerve Block Injection:  Surgeon: Mauri Hernandez MD     PRE-OP DIAGNOSIS: M47.817 (lumbosacral spondylosis), M54.5 (low back pain)    POST-OP DIAGNOSIS: Same. PROCEDURE PERFORMED: Lumbar Facet Nerve Block Multiple Levels  Bilateral L4 - 5 and L5 - S1. Physician confirmed and marked the surgical site. EBL: minimal      CONSENT: Patient has undergone the educational process with this procedure, is aware and fully understands the risks involved: potential damage to any and all body organs including possible bleeding, infection and nerve injury, allergic reaction and headache. Patient also understands that the procedure will be undertaken in a safe, controlled, and monitored setting. Patient recognizes that the benefits include relief from pain and reduction in the oral use of medications. Patient agreed to proceed. The patient was counseled at length about the risks of orion Covid-19 during their perioperative period and any recovery window from their procedure. The patient was made aware that orion Covid-19  may worsen their prognosis for recovering from their procedure  and lend to a higher morbidity and/or mortality risk. All material risks, benefits, and reasonable alternatives including postponing the procedure were discussed. The patient does wish to proceed with the procedure at this time. PREP: Timeout was performed prior to starting the procedure. The patient's back was prepped with chloroprep and draped appropriately. 0.5% lidocaine was used to anesthetize the skin and subcutaneous tissue. PROCEDURE NOTE: 25 gauge 3.5 inch spinal needles were advanced under  fluoroscopic guidance to the appropriate anatomic location for the medial branches and/or dorsal ramus corresponding to the indicated facet joints. Aspiration was negative.  1 ml of 0.25% marcaine was then injected at each site to block medial branch nerve innervating the  Bilateral L4 - 5 and L5 - S1

## 2023-08-11 ENCOUNTER — TELEPHONE (OUTPATIENT)
Dept: PAIN MANAGEMENT | Age: 53
End: 2023-08-11

## 2023-08-11 NOTE — TELEPHONE ENCOUNTER
S/P: Lumbar Facet Nerve Block Multiple Levels  Bilateral L4 - 5 and L5 - S1.      DOS: 08/10/23    Pain  before procedure with activity : 7    Pain after procedure with activity: 1    Activities following procedure include: Laundry and cleaned his house     % of pain relief: 90%    Procedure successful: Yes    OR scheduled: 08/24/23

## 2023-08-24 ENCOUNTER — HOSPITAL ENCOUNTER (OUTPATIENT)
Dept: PAIN MANAGEMENT | Facility: CLINIC | Age: 53
Discharge: HOME | End: 2023-08-24

## 2023-08-24 VITALS
WEIGHT: 310 LBS | HEART RATE: 81 BPM | TEMPERATURE: 98 F | HEIGHT: 66 IN | SYSTOLIC BLOOD PRESSURE: 135 MMHG | OXYGEN SATURATION: 97 % | BODY MASS INDEX: 49.82 KG/M2 | DIASTOLIC BLOOD PRESSURE: 69 MMHG | RESPIRATION RATE: 18 BRPM

## 2023-08-24 DIAGNOSIS — R52 PAIN MANAGEMENT: ICD-10-CM

## 2023-08-24 ASSESSMENT — PAIN - FUNCTIONAL ASSESSMENT: PAIN_FUNCTIONAL_ASSESSMENT: NONE - DENIES PAIN

## 2023-08-24 ASSESSMENT — PAIN DESCRIPTION - DESCRIPTORS: DESCRIPTORS: ACHING

## 2023-08-24 NOTE — PROGRESS NOTES
Procedure cancelled per Dr. Yuni Barahona d/t pt skin color changes to coccyx. 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 Dr. Yuni Barahona instructs patient to see his PCP regarding this and F/U with Dr. Roslyn Young office to reschedule. Pt states understanding and agrees.

## 2023-08-24 NOTE — DISCHARGE INSTRUCTIONS
You have received a sedative/anesthetic therefore you should not consume any alcoholic beverages for 24 hours. Do not drive or operate machinery for 24 hours. Do not take a tub bath for 72 hours after procedure (this includes hot tubs). You may shower, but avoid hot water to injection site. Avoid strenuous activity TODAY especially if you experience dizziness. Remove band-aid the next day. Wash off any residual iodine 24 hours from today. Do not use heat, heating pad, or any other heating device over the injection site for 3 days after the procedure. If you experience pain after your procedure, you may continue with your current pain medication as prescribed. (DO NOT INCREASE YOUR PAIN MEDICATION WITHOUT TALKING TO DOCTOR)  Soreness and pain at injection site is common, may use ice to reduce soreness. Please complete pain diary as instructed. The office staff will call you to discuss the results of the procedure within 24 hours, please call the office if you do not hear from them.       Call Joshua at [phone] if you experience:   Fever, chills or temperature over 100    Vomiting, headache, persistent stiff neck, nausea or blurred vision   Difficulty urinating or unable to urinate within 8 hours   Increase in weakness, numbness or loss of function of limbs  Increased redness, swelling or drainage at the injection site

## 2024-01-02 ENCOUNTER — TELEPHONE (OUTPATIENT)
Dept: FAMILY MEDICINE CLINIC | Age: 54
End: 2024-01-02

## 2024-01-02 ENCOUNTER — OFFICE VISIT (OUTPATIENT)
Dept: FAMILY MEDICINE CLINIC | Age: 54
End: 2024-01-02
Payer: COMMERCIAL

## 2024-01-02 VITALS
SYSTOLIC BLOOD PRESSURE: 132 MMHG | BODY MASS INDEX: 48.37 KG/M2 | WEIGHT: 301 LBS | OXYGEN SATURATION: 97 % | HEART RATE: 87 BPM | DIASTOLIC BLOOD PRESSURE: 78 MMHG | HEIGHT: 66 IN | TEMPERATURE: 98.8 F

## 2024-01-02 DIAGNOSIS — M51.36 DDD (DEGENERATIVE DISC DISEASE), LUMBAR: Primary | ICD-10-CM

## 2024-01-02 DIAGNOSIS — Z76.89 ENCOUNTER TO ESTABLISH CARE: Chronic | ICD-10-CM

## 2024-01-02 PROCEDURE — 99204 OFFICE O/P NEW MOD 45 MIN: CPT | Performed by: REGISTERED NURSE

## 2024-01-02 ASSESSMENT — ENCOUNTER SYMPTOMS
SHORTNESS OF BREATH: 0
ABDOMINAL PAIN: 0
COUGH: 0
DIARRHEA: 0
BACK PAIN: 1
VOMITING: 0
NAUSEA: 0
SINUS PRESSURE: 0
CONSTIPATION: 0
SORE THROAT: 0

## 2024-01-02 ASSESSMENT — PATIENT HEALTH QUESTIONNAIRE - PHQ9
SUM OF ALL RESPONSES TO PHQ QUESTIONS 1-9: 0
SUM OF ALL RESPONSES TO PHQ9 QUESTIONS 1 & 2: 0
1. LITTLE INTEREST OR PLEASURE IN DOING THINGS: 0
SUM OF ALL RESPONSES TO PHQ QUESTIONS 1-9: 0
2. FEELING DOWN, DEPRESSED OR HOPELESS: 0

## 2024-01-02 NOTE — PROGRESS NOTES
[facility] PRIMARY [address] [address]  Dept: [phone]    CHIEF COMPLAINT:      Chief Complaint   Patient presents with    Established New Doctor     Wants [employer] forms filled out for back pain       HPI      Joel Marc is a 53 y.o. male who presents to establish care. He was previously seen by Kirstie Park NP. His past medical history is noted below. He is , no children, work at [employer], He is a former tobacco user, uses social alcohol, He denies an recreational drugs. He is here today for his FMLA paperwork to be complete. Dr. Chiang pain management refuses to complete FMLA forms. I have referred him to physical therapy and neurosurgery for his MRI to be evaluated to see if further intervention can be done for his pain. I would also appreciate input from them on how much time is warranted as needing to be off in relation to his condition so I can more accurately help him with what he needs.    ESTABLISHING CARE   His chronic medical conditions are noted below:  Patient Active Problem List:     Vitamin D deficiency     Scalp psoriasis     Obstructive sleep apnea syndrome     Asthma     Indigestion     Morbid obesity (HCC)     Low iron     S/P laparoscopic sleeve gastrectomy     Tremor     Chronic bilateral low back pain without sciatica     DDD (degenerative disc disease), lumbar     Spinal stenosis of lumbar region without neurogenic claudication       ASSESSMENT & PLAN     1. DDD (degenerative disc disease), lumbar  -     Aneesh See MD, Neurosurgery, [place]  [facility] [facility] [place]  2. Encounter to establish care  Comments:  completed and faxed his FMLA paperwork.     Return in about 6 months (around 7/2/2024) for well visit.       SUBJECTIVE/OBJECTIVE          Review of Systems   Constitutional:  Negative for activity change, appetite change, fever and unexpected weight change.   HENT:  Negative for

## 2024-01-10 ENCOUNTER — TELEPHONE (OUTPATIENT)
Age: 54
End: 2024-01-10

## 2024-01-10 NOTE — TELEPHONE ENCOUNTER
1/10/2024 - left 3 messages for patient to call back and schedule a new patient appointment. Referral in [facility].

## 2024-10-10 ENCOUNTER — TELEPHONE (OUTPATIENT)
Dept: BARIATRICS/WEIGHT MGMT | Age: 54
End: 2024-10-10

## (undated) DEVICE — RESERVOIR,SUCTION,100CC,SILICONE: Brand: MEDLINE

## (undated) DEVICE — REDUCER: Brand: ENDOWRIST

## (undated) DEVICE — GARMENT,MEDLINE,DVT,INT,CALF,XL,GEN2: Brand: MEDLINE

## (undated) DEVICE — STAPLER 60: Brand: SUREFORM

## (undated) DEVICE — BINDER ABD XL W15IN 62 74IN CIRC UNISX 5 PNL E CNTCT CLSR

## (undated) DEVICE — STAPLER 60 RELOAD GREEN: Brand: SUREFORM

## (undated) DEVICE — GOWN,AURORA,NONREINFORCED,LARGE: Brand: MEDLINE

## (undated) DEVICE — GLOVE ORANGE PI 8   MSG9080

## (undated) DEVICE — INSUFFLATION TUBING SET WITH FILTER, FUNNEL CONNECTOR AND LUER LOCK: Brand: JOSNOE MEDICAL INC

## (undated) DEVICE — FORCEPS BX L240CM WRK CHN 2.8MM STD CAP W/ NDL MIC MESH

## (undated) DEVICE — TIP COVER ACCESSORY

## (undated) DEVICE — BLADELESS OBTURATOR: Brand: WECK VISTA

## (undated) DEVICE — COVER,LIGHT HANDLE,FLX,2/PK: Brand: MEDLINE INDUSTRIES, INC.

## (undated) DEVICE — TOWEL,OR,DSP,ST,NATURAL,DLX,4/PK,20PK/CS: Brand: MEDLINE

## (undated) DEVICE — TROCAR: Brand: KII FIOS FIRST ENTRY

## (undated) DEVICE — GLOVE ORANGE PI 7   MSG9070

## (undated) DEVICE — SUTURE VCRL SZ 0 L27IN ABSRB UD L36MM CT-1 1/2 CIR J260H

## (undated) DEVICE — Device

## (undated) DEVICE — SKIN AFFIX SURG ADHESIVE 72/CS 0.55ML: Brand: MEDLINE

## (undated) DEVICE — GLOVE SURG SZ 65 THK91MIL LTX FREE SYN POLYISOPRENE

## (undated) DEVICE — CHLORAPREP 26ML ORANGE

## (undated) DEVICE — VESSEL SEALER EXTEND: Brand: ENDOWRIST

## (undated) DEVICE — SUTURE VIC + SH-13-0 27IN  VCP311H

## (undated) DEVICE — STAPLER 60 RELOAD BLACK: Brand: SUREFORM

## (undated) DEVICE — SUTURE ABSRB BRAID COAT VLT SH 3-0 27IN VCRL J311H

## (undated) DEVICE — SEAL

## (undated) DEVICE — SUTURE MCRYL SZ 4-0 L18IN ABSRB UD L16MM PC-3 3/8 CIR PRIM Y845G

## (undated) DEVICE — TOTAL TRAY, 16FR 10ML SIL FOLEY, URN: Brand: MEDLINE

## (undated) DEVICE — SUTURE SZ 0 27IN 5/8 CIR UR-6  TAPER PT VIOLET ABSRB VICRYL J603H

## (undated) DEVICE — DRESSING TRNSPAR W5XL4.5IN FLM SHT SEMIPERMEABLE WIND

## (undated) DEVICE — BLADE CLIPPER GEN PURP NS

## (undated) DEVICE — GAUZE,SPONGE,4"X4",16PLY,XRAY,STRL,LF: Brand: MEDLINE

## (undated) DEVICE — SUTURE NONABSORBABLE MONOFILAMENT 3-0 PS-1 18 IN BLK ETHILON 1663H

## (undated) DEVICE — GAUZE,SPONGE,FLUFF,6"X6.75",STRL,5/TRAY: Brand: MEDLINE

## (undated) DEVICE — SOLUTION ANTIFOG VIS SYS CLEARIFY LAPSCP

## (undated) DEVICE — VISIGI 3D®  CALIBRATION SYSTEM  SIZE 36FR STD W/ BULB: Brand: BOEHRINGER® VISIGI 3D™ SLEEVE GASTRECTOMY CALIBRATION SYSTEM, SIZE 36FR W/BULB

## (undated) DEVICE — DRAIN SURG 19FR 100% SIL RADPQ RND CHN FULL FLUT

## (undated) DEVICE — CANNULA SEAL

## (undated) DEVICE — STAPLER 60 RELOAD BLUE: Brand: SUREFORM

## (undated) DEVICE — ARM DRAPE